# Patient Record
Sex: FEMALE | Race: WHITE | NOT HISPANIC OR LATINO | Employment: FULL TIME | URBAN - METROPOLITAN AREA
[De-identification: names, ages, dates, MRNs, and addresses within clinical notes are randomized per-mention and may not be internally consistent; named-entity substitution may affect disease eponyms.]

---

## 2017-05-04 ENCOUNTER — TRANSCRIBE ORDERS (OUTPATIENT)
Dept: ADMINISTRATIVE | Facility: HOSPITAL | Age: 57
End: 2017-05-04

## 2017-05-04 DIAGNOSIS — R42 VERTIGO: Primary | ICD-10-CM

## 2017-05-11 ENCOUNTER — HOSPITAL ENCOUNTER (OUTPATIENT)
Dept: PULMONOLOGY | Facility: HOSPITAL | Age: 57
Discharge: HOME | End: 2017-05-11
Admitting: FAMILY MEDICINE

## 2017-05-11 DIAGNOSIS — R42 VERTIGO: ICD-10-CM

## 2017-05-11 PROCEDURE — 95819 EEG AWAKE AND ASLEEP: CPT

## 2017-12-06 ENCOUNTER — HOSPITAL ENCOUNTER (EMERGENCY)
Facility: HOSPITAL | Age: 57
Discharge: HOME | End: 2017-12-07
Attending: EMERGENCY MEDICINE | Admitting: EMERGENCY MEDICINE

## 2017-12-06 ENCOUNTER — APPOINTMENT (OUTPATIENT)
Dept: CT IMAGING | Facility: HOSPITAL | Age: 57
End: 2017-12-06

## 2017-12-06 DIAGNOSIS — R03.0 ELEVATED BLOOD-PRESSURE READING WITHOUT DIAGNOSIS OF HYPERTENSION: ICD-10-CM

## 2017-12-06 DIAGNOSIS — M54.50 ACUTE LEFT-SIDED LOW BACK PAIN WITHOUT SCIATICA: Primary | ICD-10-CM

## 2017-12-06 LAB
ALBUMIN SERPL-MCNC: 4.2 G/DL (ref 3.5–5.2)
ALBUMIN/GLOB SERPL: 1.3 G/DL
ALP SERPL-CCNC: 80 U/L (ref 40–129)
ALT SERPL W P-5'-P-CCNC: 18 U/L (ref 5–33)
ANION GAP SERPL CALCULATED.3IONS-SCNC: 16.8 MMOL/L
AST SERPL-CCNC: 11 U/L (ref 5–32)
BACTERIA UR QL AUTO: ABNORMAL /HPF
BASOPHILS # BLD AUTO: 0.08 10*3/MM3 (ref 0–0.2)
BASOPHILS NFR BLD AUTO: 1.1 % (ref 0–2)
BILIRUB SERPL-MCNC: 0.5 MG/DL (ref 0.2–1.2)
BILIRUB UR QL STRIP: NEGATIVE
BUN BLD-MCNC: 14 MG/DL (ref 6–20)
BUN/CREAT SERPL: 17.1 (ref 7–25)
CALCIUM SPEC-SCNC: 9.2 MG/DL (ref 8.6–10.5)
CHLORIDE SERPL-SCNC: 102 MMOL/L (ref 98–107)
CLARITY UR: CLEAR
CO2 SERPL-SCNC: 23.2 MMOL/L (ref 22–29)
COLOR UR: YELLOW
CREAT BLD-MCNC: 0.82 MG/DL (ref 0.57–1)
DEPRECATED RDW RBC AUTO: 42.9 FL (ref 37–54)
EOSINOPHIL # BLD AUTO: 0.27 10*3/MM3 (ref 0.1–0.3)
EOSINOPHIL NFR BLD AUTO: 3.6 % (ref 0–4)
ERYTHROCYTE [DISTWIDTH] IN BLOOD BY AUTOMATED COUNT: 13.2 % (ref 11.5–14.5)
GFR SERPL CREATININE-BSD FRML MDRD: 72 ML/MIN/1.73
GLOBULIN UR ELPH-MCNC: 3.2 GM/DL
GLUCOSE BLD-MCNC: 106 MG/DL (ref 65–99)
GLUCOSE UR STRIP-MCNC: NEGATIVE MG/DL
HCT VFR BLD AUTO: 40.1 % (ref 37–47)
HGB BLD-MCNC: 13.3 G/DL (ref 12–16)
HGB UR QL STRIP.AUTO: NEGATIVE
HYALINE CASTS UR QL AUTO: ABNORMAL /LPF
IMM GRANULOCYTES # BLD: 0.01 10*3/MM3 (ref 0–0.03)
IMM GRANULOCYTES NFR BLD: 0.1 % (ref 0–0.5)
KETONES UR QL STRIP: ABNORMAL
LEUKOCYTE ESTERASE UR QL STRIP.AUTO: ABNORMAL
LYMPHOCYTES # BLD AUTO: 2.28 10*3/MM3 (ref 0.6–4.8)
LYMPHOCYTES NFR BLD AUTO: 30.8 % (ref 20–45)
MCH RBC QN AUTO: 29.4 PG (ref 27–31)
MCHC RBC AUTO-ENTMCNC: 33.2 G/DL (ref 31–37)
MCV RBC AUTO: 88.5 FL (ref 81–99)
MONOCYTES # BLD AUTO: 0.69 10*3/MM3 (ref 0–1)
MONOCYTES NFR BLD AUTO: 9.3 % (ref 3–8)
NEUTROPHILS # BLD AUTO: 4.08 10*3/MM3 (ref 1.5–8.3)
NEUTROPHILS NFR BLD AUTO: 55.1 % (ref 45–70)
NITRITE UR QL STRIP: NEGATIVE
NRBC BLD MANUAL-RTO: 0 /100 WBC (ref 0–0)
PH UR STRIP.AUTO: 5.5 [PH] (ref 4.5–8)
PLATELET # BLD AUTO: 231 10*3/MM3 (ref 140–500)
PMV BLD AUTO: 10.6 FL (ref 7.4–10.4)
POTASSIUM BLD-SCNC: 3.7 MMOL/L (ref 3.5–5.2)
PROT SERPL-MCNC: 7.4 G/DL (ref 6–8.5)
PROT UR QL STRIP: ABNORMAL
RBC # BLD AUTO: 4.53 10*6/MM3 (ref 4.2–5.4)
RBC # UR: ABNORMAL /HPF
REF LAB TEST METHOD: ABNORMAL
SODIUM BLD-SCNC: 142 MMOL/L (ref 136–145)
SP GR UR STRIP: 1.03 (ref 1–1.03)
SQUAMOUS #/AREA URNS HPF: ABNORMAL /HPF
UROBILINOGEN UR QL STRIP: ABNORMAL
WBC NRBC COR # BLD: 7.41 10*3/MM3 (ref 4.8–10.8)
WBC UR QL AUTO: ABNORMAL /HPF

## 2017-12-06 PROCEDURE — 81001 URINALYSIS AUTO W/SCOPE: CPT | Performed by: EMERGENCY MEDICINE

## 2017-12-06 PROCEDURE — 25010000002 ONDANSETRON PER 1 MG

## 2017-12-06 PROCEDURE — 96375 TX/PRO/DX INJ NEW DRUG ADDON: CPT

## 2017-12-06 PROCEDURE — 74176 CT ABD & PELVIS W/O CONTRAST: CPT

## 2017-12-06 PROCEDURE — 25010000002 KETOROLAC TROMETHAMINE PER 15 MG

## 2017-12-06 PROCEDURE — 96374 THER/PROPH/DIAG INJ IV PUSH: CPT

## 2017-12-06 PROCEDURE — 85025 COMPLETE CBC W/AUTO DIFF WBC: CPT | Performed by: EMERGENCY MEDICINE

## 2017-12-06 PROCEDURE — 99284 EMERGENCY DEPT VISIT MOD MDM: CPT | Performed by: EMERGENCY MEDICINE

## 2017-12-06 PROCEDURE — 25010000002 HYDROMORPHONE PER 4 MG

## 2017-12-06 PROCEDURE — 99284 EMERGENCY DEPT VISIT MOD MDM: CPT

## 2017-12-06 PROCEDURE — 80053 COMPREHEN METABOLIC PANEL: CPT | Performed by: EMERGENCY MEDICINE

## 2017-12-06 RX ORDER — ONDANSETRON 2 MG/ML
4 INJECTION INTRAMUSCULAR; INTRAVENOUS ONCE
Status: COMPLETED | OUTPATIENT
Start: 2017-12-06 | End: 2017-12-06

## 2017-12-06 RX ORDER — KETOROLAC TROMETHAMINE 30 MG/ML
30 INJECTION, SOLUTION INTRAMUSCULAR; INTRAVENOUS ONCE
Status: COMPLETED | OUTPATIENT
Start: 2017-12-06 | End: 2017-12-06

## 2017-12-06 RX ORDER — ONDANSETRON 2 MG/ML
INJECTION INTRAMUSCULAR; INTRAVENOUS
Status: COMPLETED
Start: 2017-12-06 | End: 2017-12-06

## 2017-12-06 RX ORDER — KETOROLAC TROMETHAMINE 30 MG/ML
INJECTION, SOLUTION INTRAMUSCULAR; INTRAVENOUS
Status: COMPLETED
Start: 2017-12-06 | End: 2017-12-06

## 2017-12-06 RX ORDER — SODIUM CHLORIDE 0.9 % (FLUSH) 0.9 %
10 SYRINGE (ML) INJECTION AS NEEDED
Status: DISCONTINUED | OUTPATIENT
Start: 2017-12-06 | End: 2017-12-07 | Stop reason: HOSPADM

## 2017-12-06 RX ORDER — PHENTERMINE HYDROCHLORIDE 37.5 MG/1
37.5 CAPSULE ORAL EVERY MORNING
COMMUNITY
End: 2018-03-07 | Stop reason: ALTCHOICE

## 2017-12-06 RX ADMIN — KETOROLAC TROMETHAMINE 30 MG: 30 INJECTION INTRAMUSCULAR; INTRAVENOUS at 22:46

## 2017-12-06 RX ADMIN — ONDANSETRON 4 MG: 2 INJECTION INTRAMUSCULAR; INTRAVENOUS at 22:46

## 2017-12-06 RX ADMIN — HYDROMORPHONE HYDROCHLORIDE 1 MG: 1 INJECTION, SOLUTION INTRAMUSCULAR; INTRAVENOUS; SUBCUTANEOUS at 22:47

## 2017-12-06 RX ADMIN — KETOROLAC TROMETHAMINE 30 MG: 30 INJECTION, SOLUTION INTRAMUSCULAR; INTRAVENOUS at 22:46

## 2017-12-06 RX ADMIN — ONDANSETRON 4 MG: 2 INJECTION, SOLUTION INTRAMUSCULAR; INTRAVENOUS at 22:46

## 2017-12-06 RX ADMIN — Medication 1 MG: at 22:47

## 2017-12-07 VITALS
HEART RATE: 87 BPM | OXYGEN SATURATION: 96 % | RESPIRATION RATE: 20 BRPM | SYSTOLIC BLOOD PRESSURE: 159 MMHG | WEIGHT: 245 LBS | HEIGHT: 64 IN | TEMPERATURE: 97.2 F | BODY MASS INDEX: 41.83 KG/M2 | DIASTOLIC BLOOD PRESSURE: 106 MMHG

## 2017-12-07 PROCEDURE — 63710000001 PREDNISONE PER 1 MG: Performed by: EMERGENCY MEDICINE

## 2017-12-07 PROCEDURE — 99284 EMERGENCY DEPT VISIT MOD MDM: CPT | Performed by: EMERGENCY MEDICINE

## 2017-12-07 RX ORDER — ONDANSETRON 4 MG/1
TABLET, ORALLY DISINTEGRATING ORAL
Status: COMPLETED
Start: 2017-12-07 | End: 2017-12-07

## 2017-12-07 RX ORDER — PREDNISONE 10 MG/1
TABLET ORAL
Qty: 21 TABLET | Refills: 0 | Status: SHIPPED | OUTPATIENT
Start: 2017-12-07 | End: 2018-03-09 | Stop reason: HOSPADM

## 2017-12-07 RX ORDER — NABUMETONE 750 MG/1
750 TABLET, FILM COATED ORAL 2 TIMES DAILY PRN
Qty: 14 TABLET | Refills: 0 | Status: SHIPPED | OUTPATIENT
Start: 2017-12-07 | End: 2017-12-14

## 2017-12-07 RX ORDER — ONDANSETRON 4 MG/1
4 TABLET, ORALLY DISINTEGRATING ORAL ONCE
Status: COMPLETED | OUTPATIENT
Start: 2017-12-07 | End: 2017-12-07

## 2017-12-07 RX ORDER — OXYCODONE HYDROCHLORIDE AND ACETAMINOPHEN 5; 325 MG/1; MG/1
1 TABLET ORAL EVERY 6 HOURS PRN
Qty: 25 TABLET | Refills: 0 | Status: SHIPPED | OUTPATIENT
Start: 2017-12-07 | End: 2018-03-09 | Stop reason: HOSPADM

## 2017-12-07 RX ORDER — METHOCARBAMOL 750 MG/1
1500 TABLET, FILM COATED ORAL 3 TIMES DAILY PRN
Qty: 42 TABLET | Refills: 0 | Status: SHIPPED | OUTPATIENT
Start: 2017-12-07 | End: 2017-12-14

## 2017-12-07 RX ORDER — PREDNISONE 20 MG/1
60 TABLET ORAL ONCE
Status: COMPLETED | OUTPATIENT
Start: 2017-12-07 | End: 2017-12-07

## 2017-12-07 RX ADMIN — ONDANSETRON 4 MG: 4 TABLET, ORALLY DISINTEGRATING ORAL at 00:56

## 2017-12-07 RX ADMIN — PREDNISONE 60 MG: 20 TABLET ORAL at 00:51

## 2017-12-07 NOTE — ED PROVIDER NOTES
Subjective   Patient is a 57 y.o. female presenting with back pain.   History provided by:  Patient  Back Pain   Location:  Sacro-iliac joint (left)  Quality:  Stabbing and shooting  Radiates to: to left flank.  Pain severity:  Severe  Onset quality: gradual onset of pain this past weekend.  The pain worsened acutely 9 PM tonight.  Progression:  Waxing and waning  Chronicity:  New  Context comment:  As described below.  Patient initially saw the pain may have been secondary to sitting on bleachers this weekend.  Relieved by:  Nothing  Worsened by:  Nothing  Ineffective treatments:  NSAIDs  Associated symptoms: no abdominal swelling, no bladder incontinence, no bowel incontinence, no chest pain, no dysuria, no fever, no headaches, no leg pain, no numbness, no paresthesias, no pelvic pain, no perianal numbness, no tingling, no weakness and no weight loss    Associated symptoms comment:  Nausea but no vomiting.  Risk factors: lack of exercise and obesity      HPI Narrative:Ms. Pennington is a 58 yo WF Coumadin secondary to left low back pain.  Onset of symptoms 5 days ago.  No initiating illness or injury.  The back pain was mild at that time.  It is waxed and waned since then.  Approximately 9 PM this evening the pain worsened acutely.  She is unable find a comfortable position.  She has had nausea but no vomiting.  She denies hematuria, dysuria or urinary frequency.  No other associated symptoms.  No fever or chills.  No ill contacts.  Patient presents for evaluation.        Review of Systems   Constitutional: Negative.  Negative for appetite change, diaphoresis, fever and weight loss.   HENT: Negative.    Eyes: Negative.    Respiratory: Negative for cough, chest tightness, shortness of breath and wheezing.    Cardiovascular: Negative for chest pain, palpitations and leg swelling.   Gastrointestinal: Negative for bowel incontinence.   Genitourinary: Negative.  Negative for bladder incontinence, difficulty urinating,  dysuria, flank pain, frequency, hematuria and pelvic pain.   Musculoskeletal: Positive for back pain.   Skin: Negative.  Negative for color change, pallor and rash.   Neurological: Negative.  Negative for dizziness, tingling, seizures, syncope, weakness, numbness, headaches and paresthesias.   Psychiatric/Behavioral: Negative.  Negative for agitation, behavioral problems and hallucinations.       Past Medical History:   Diagnosis Date   • Kidney stone        No Known Allergies    Past Surgical History:   Procedure Laterality Date   • APPENDECTOMY     • HYSTERECTOMY     • LAPAROSCOPIC GASTRIC BANDING         History reviewed. No pertinent family history.    Social History     Social History   • Marital status:      Spouse name: N/A   • Number of children: N/A   • Years of education: N/A     Social History Main Topics   • Smoking status: Never Smoker   • Smokeless tobacco: None   • Alcohol use No   • Drug use: None   • Sexual activity: Not Asked     Other Topics Concern   • None     Social History Narrative   • None           Objective   Physical Exam   Constitutional: She is oriented to person, place, and time. She appears well-developed and well-nourished. No distress.   57-year-old white female sitting on the side of bed.  She is leaning forward with her hands on her knees.  garbage can is beside patient's is beside her left foot.  She is rocking to and fro and is in obvious pain.     HENT:   Head: Normocephalic and atraumatic.   Right Ear: External ear normal.   Left Ear: External ear normal.   Nose: Nose normal.   Mouth/Throat: Oropharynx is clear and moist.   Eyes: Conjunctivae and EOM are normal. Pupils are equal, round, and reactive to light.   Neck: Normal range of motion. Neck supple.   Cardiovascular: Normal rate, regular rhythm, normal heart sounds and intact distal pulses.  Exam reveals no gallop and no friction rub.    No murmur heard.  Pulmonary/Chest: Effort normal and breath sounds normal.    Abdominal: Soft. Bowel sounds are normal. She exhibits no distension. There is no tenderness.   Musculoskeletal: Normal range of motion. She exhibits no tenderness.   Neurological: She is alert and oriented to person, place, and time.   Skin: Skin is warm and dry. She is not diaphoretic.   Psychiatric: She has a normal mood and affect. Her behavior is normal.   Nursing note and vitals reviewed.      Procedures         ED Course  ED Course   Comment By Time   12/06/17  10:51 PM  Patient is in obvious discomfort.  I suspect patient has a kidney stone.  Given Dilaudid, Toradol and Zofran. Miguel Thompson MD 12/07 0022 12/06/17  11:33 PM  Patient reports pain is much improved.  Nausea has resolved. Miguel Thompson MD 12/07 0023 12/07/17  12:23 AM  CBC and CMP are unremarkable.  UA notable for 15 ketones, trace bacteria withno WBCs and 0-2 RBCs.  Trace leukocyte Estrace.  Negative nitrate.  Awaiting CT results. Miguel Thompson MD 12/07 0023 12/07/17  12:42 AM  CT doesn't show any cause for patient's pain. Patient now reports she was at a ball game and was seated on bleachers for a few hours prior to onset of symptoms.  I feel patient's symptoms are due to low back pain.  Specifically the left SI joint.  Patient's blood pressures improved but still elevated.  Patient does not have a history of hypertension.  She reports however that her cough has been monitoring her blood pressure the past few months.  Will prescribe steroids, muscle relaxants, NSAIDs and pain medication for home.  Discussed at length with patient and family all results, diagnoses, treatment and follow-up. Miguel Thompson MD 12/07 0043      Labs Reviewed   URINALYSIS W/ CULTURE IF INDICATED - Abnormal; Notable for the following:        Result Value    Ketones, UA 15 mg/dL (1+) (*)     Protein, UA 30 mg/dL (1+) (*)     Leuk Esterase, UA Trace (*)     All other components within normal limits   COMPREHENSIVE METABOLIC PANEL - Abnormal;  Notable for the following:     Glucose 106 (*)     All other components within normal limits   CBC WITH AUTO DIFFERENTIAL - Abnormal; Notable for the following:     MPV 10.6 (*)     Monocyte % 9.3 (*)     All other components within normal limits   URINALYSIS, MICROSCOPIC ONLY - Abnormal; Notable for the following:     RBC, UA 0-2 (*)     Bacteria, UA Trace (*)     All other components within normal limits   CBC AND DIFFERENTIAL    Narrative:     The following orders were created for panel order CBC & Differential.  Procedure                               Abnormality         Status                     ---------                               -----------         ------                     CBC Auto Differential[[id number]]        Abnormal            Final result                 Please view results for these tests on the individual orders.     My differential diagnosis for dyspnea includes but is not limited to:  Asthma, COPD, pneumonia, pulmonary embolus, acute respiratory distress syndrome, pneumothorax, pleural effusion, pulmonary fibrosis, congestive heart failure, myocardial infarction, DKA, uremia, acidosis, sepsis, anemia, drug related, hyperventilation, CNS disease    My differential diagnosis for chest pain includes but is not limited to:  Muscle strain, costochondritis, myositis, pleurisy, rib fracture, intercostal neuritis, herpes zoster, tumor, myocardial infarction, coronary syndrome, unstable angina, angina, aortic dissection, mitral valve prolapse, pericarditis, palpitations, pulmonary embolus, pneumonia, pneumothorax, lung cancer, GERD, esophagitis, esophageal spasm              MDM  Number of Diagnoses or Management Options  Acute left-sided low back pain without sciatica: new and requires workup  Elevated blood-pressure reading without diagnosis of hypertension: new and requires workup     Amount and/or Complexity of Data Reviewed  Clinical lab tests: reviewed and ordered  Tests in the radiology section  of CPT®: reviewed and ordered  Decide to obtain previous medical records or to obtain history from someone other than the patient: yes (Indu Solares RN here at [facility])  Independent visualization of images, tracings, or specimens: yes    Risk of Complications, Morbidity, and/or Mortality  Presenting problems: low  Diagnostic procedures: moderate  Management options: low    Patient Progress  Patient progress: stable      Final diagnoses:   Acute left-sided low back pain without sciatica   Elevated blood-pressure reading without diagnosis of hypertension              Miguel Thompson MD  12/07/17 0306

## 2017-12-07 NOTE — ED NOTES
Pt's pain worsened with sitting up and dressing.  Pt became nauseated.  Dr Thompson aware.  Salo ordered.     Kim Presley RN  12/07/17 0059

## 2017-12-07 NOTE — DISCHARGE INSTRUCTIONS
Medication as directed.  Apply heat to painful area.  Gentle stretching.  Recommend take blood pressure at home once to twice daily and record.  Take his record to follow-up with Dr. Downing.  Call Dr. Downing's office in the morning to arrange follow-up within the next week for continued or worsening symptoms.  Sooner if needed.  Return to ED for medical emergencies.    Hypertension  Hypertension, commonly called high blood pressure, is when the force of blood pumping through your arteries is too strong. Your arteries are the blood vessels that carry blood from your heart throughout your body. A blood pressure reading consists of a higher number over a lower number, such as 110/72. The higher number (systolic) is the pressure inside your arteries when your heart pumps. The lower number (diastolic) is the pressure inside your arteries when your heart relaxes. Ideally you want your blood pressure below 120/80.  Hypertension forces your heart to work harder to pump blood. Your arteries may become narrow or stiff. Having untreated or uncontrolled hypertension can cause heart attack, stroke, kidney disease, and other problems.  RISK FACTORS  Some risk factors for high blood pressure are controllable. Others are not.   Risk factors you cannot control include:   · Race. You may be at higher risk if you are .  · Age. Risk increases with age.  · Gender. Men are at higher risk than women before age 45 years. After age 65, women are at higher risk than men.  Risk factors you can control include:  · Not getting enough exercise or physical activity.  · Being overweight.  · Getting too much fat, sugar, calories, or salt in your diet.  · Drinking too much alcohol.  SIGNS AND SYMPTOMS  Hypertension does not usually cause signs or symptoms. Extremely high blood pressure (hypertensive crisis) may cause headache, anxiety, shortness of breath, and nosebleed.  DIAGNOSIS  To check if you have hypertension, your health care  provider will measure your blood pressure while you are seated, with your arm held at the level of your heart. It should be measured at least twice using the same arm. Certain conditions can cause a difference in blood pressure between your right and left arms. A blood pressure reading that is higher than normal on one occasion does not mean that you need treatment. If it is not clear whether you have high blood pressure, you may be asked to return on a different day to have your blood pressure checked again. Or, you may be asked to monitor your blood pressure at home for 1 or more weeks.  TREATMENT  Treating high blood pressure includes making lifestyle changes and possibly taking medicine. Living a healthy lifestyle can help lower high blood pressure. You may need to change some of your habits.  Lifestyle changes may include:  · Following the DASH diet. This diet is high in fruits, vegetables, and whole grains. It is low in salt, red meat, and added sugars.  · Keep your sodium intake below 2,300 mg per day.  · Getting at least 30-45 minutes of aerobic exercise at least 4 times per week.  · Losing weight if necessary.  · Not smoking.  · Limiting alcoholic beverages.  · Learning ways to reduce stress.  Your health care provider may prescribe medicine if lifestyle changes are not enough to get your blood pressure under control, and if one of the following is true:  · You are 18-59 years of age and your systolic blood pressure is above 140.  · You are 60 years of age or older, and your systolic blood pressure is above 150.  · Your diastolic blood pressure is above 90.  · You have diabetes, and your systolic blood pressure is over 140 or your diastolic blood pressure is over 90.  · You have kidney disease and your blood pressure is above 140/90.  · You have heart disease and your blood pressure is above 140/90.  Your personal target blood pressure may vary depending on your medical conditions, your age, and other  factors.  HOME CARE INSTRUCTIONS  · Have your blood pressure rechecked as directed by your health care provider.    · Take medicines only as directed by your health care provider. Follow the directions carefully. Blood pressure medicines must be taken as prescribed. The medicine does not work as well when you skip doses. Skipping doses also puts you at risk for problems.  · Do not smoke.    · Monitor your blood pressure at home as directed by your health care provider.   SEEK MEDICAL CARE IF:   · You think you are having a reaction to medicines taken.  · You have recurrent headaches or feel dizzy.  · You have swelling in your ankles.  · You have trouble with your vision.  SEEK IMMEDIATE MEDICAL CARE IF:  · You develop a severe headache or confusion.  · You have unusual weakness, numbness, or feel faint.  · You have severe chest or abdominal pain.  · You vomit repeatedly.  · You have trouble breathing.  MAKE SURE YOU:   · Understand these instructions.  · Will watch your condition.  · Will get help right away if you are not doing well or get worse.     This information is not intended to replace advice given to you by your health care provider. Make sure you discuss any questions you have with your health care provider.     Document Released: 12/18/2006 Document Revised: 05/03/2016 Document Reviewed: 10/10/2014  [employer] Interactive Patient Education ©2017 [employer]

## 2017-12-07 NOTE — ED PROVIDER NOTES
Subjective   History of Present Illness    Review of Systems    Past Medical History:   Diagnosis Date   • Kidney stone        No Known Allergies    Past Surgical History:   Procedure Laterality Date   • APPENDECTOMY     • HYSTERECTOMY     • LAPAROSCOPIC GASTRIC BANDING         History reviewed. No pertinent family history.    Social History     Social History   • Marital status:      Spouse name: N/A   • Number of children: N/A   • Years of education: N/A     Social History Main Topics   • Smoking status: Never Smoker   • Smokeless tobacco: None   • Alcohol use No   • Drug use: None   • Sexual activity: Not Asked     Other Topics Concern   • None     Social History Narrative   • None           Objective   Physical Exam    Procedures         ED Course  ED Course   Comment By Time   12/06/17  10:51 PM  Patient is in obvious discomfort.  I suspect patient has a kidney stone.  Given Dilaudid, Toradol and Zofran. Miguel Thompson MD 12/07 0022 12/06/17  11:33 PM  Patient reports pain is much improved.  Nausea has resolved. Miguel Thompson MD 12/07 0023 12/07/17  12:23 AM  CBC and CMP are unremarkable.  UA notable for 15 ketones, trace bacteria withno WBCs and 0-2 RBCs.  Trace leukocyte Estrace.  Negative nitrate.  Awaiting CT results. Miguel Thompson MD 12/07 0023 12/07/17  12:42 AM  CT doesn't show any cause for patient's pain. Patient now reports she was at a ball game and was seated on bleachers for a few hours prior to onset of symptoms.  I feel patient's symptoms are due to low back pain.  Specifically the left SI joint.  Patient's blood pressures improved but still elevated.  Patient does not have a history of hypertension.  She reports however that her cough has been monitoring her blood pressure the past few months.  Will prescribe steroids, muscle relaxants, NSAIDs and pain medication for home.  Discussed at length with patient and family all results, diagnoses, treatment and follow-up.  Miguel Thompson MD 12/07 0043        10:10 AM, 12/07/17:  Dr. 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  The radiologist says that it appeared slightly larger than on the 2009 exam.    10:11 AM, 12/07/17:  I spoke with the patient by telephone.  She was unaware that she had this finding in her lungs.  Advised to follow-up with Dr. Downing, her primary care provider and should return to emergency department if she gets worse.          MDM    Final diagnoses:   Acute left-sided low back pain without sciatica   Elevated blood-pressure reading without diagnosis of hypertension            Ant Montiel MD  12/07/17 1011

## 2017-12-11 ENCOUNTER — TRANSCRIBE ORDERS (OUTPATIENT)
Dept: ADMINISTRATIVE | Facility: HOSPITAL | Age: 57
End: 2017-12-11

## 2017-12-11 DIAGNOSIS — I77.89 AV MALFORMATION, ACQUIRED (HCC): Primary | ICD-10-CM

## 2017-12-15 ENCOUNTER — HOSPITAL ENCOUNTER (OUTPATIENT)
Dept: CT IMAGING | Facility: HOSPITAL | Age: 57
Discharge: HOME | End: 2017-12-15
Attending: FAMILY MEDICINE | Admitting: FAMILY MEDICINE

## 2017-12-15 ENCOUNTER — HOSPITAL ENCOUNTER (OUTPATIENT)
Dept: GENERAL RADIOLOGY | Facility: HOSPITAL | Age: 57
Discharge: HOME | End: 2017-12-15

## 2017-12-15 DIAGNOSIS — I77.89 AV MALFORMATION, ACQUIRED (HCC): ICD-10-CM

## 2017-12-15 PROCEDURE — 71275 CT ANGIOGRAPHY CHEST: CPT

## 2017-12-15 PROCEDURE — 0 IOPAMIDOL PER 1 ML: Performed by: FAMILY MEDICINE

## 2017-12-15 PROCEDURE — 72100 X-RAY EXAM L-S SPINE 2/3 VWS: CPT

## 2017-12-15 RX ADMIN — IOPAMIDOL 100 ML: 755 INJECTION, SOLUTION INTRAVENOUS at 09:11

## 2017-12-15 RX ADMIN — IOPAMIDOL 50 ML: 755 INJECTION, SOLUTION INTRAVENOUS at 09:11

## 2018-01-30 ENCOUNTER — OFFICE VISIT (OUTPATIENT)
Dept: SURGERY | Facility: CLINIC | Age: 58
End: 2018-01-30

## 2018-01-30 VITALS
SYSTOLIC BLOOD PRESSURE: 144 MMHG | HEART RATE: 105 BPM | DIASTOLIC BLOOD PRESSURE: 85 MMHG | WEIGHT: 224.6 LBS | BODY MASS INDEX: 38.35 KG/M2 | HEIGHT: 64 IN | OXYGEN SATURATION: 94 %

## 2018-01-30 DIAGNOSIS — Q27.30 ARTERIOVENOUS MALFORMATION (AVM): Primary | ICD-10-CM

## 2018-01-30 PROCEDURE — 99244 OFF/OP CNSLTJ NEW/EST MOD 40: CPT | Performed by: THORACIC SURGERY (CARDIOTHORACIC VASCULAR SURGERY)

## 2018-01-30 NOTE — PROGRESS NOTES
Subjective   Patient ID: Rylee Pennington is a 57 y.o. female is being seen for consultation today at the request of Dr. Ant Downing.    History of Present Illness  Dear Colleague,  Rylee Pennington was seen in our office today for evaluation and treatment of a pulmonary arteriovenous malformation.  Reviewed her history, her x-rays, and have examined her.  Ms. Pennington presented to the emergency room with low back pain.  During her evaluation CT scan of the chest was performed.  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  This has been asymptomatic.  She has no cough or hemoptysis.  She has no pleuritic pain.  She has no hoarseness or change in her voice.  She is active without significant shortness of breath or dyspnea on exertion.  She has had no unexplained weight loss.    The following portions of the patient's history were reviewed and updated as appropriate: allergies, current medications, past family history, past medical history, past social history, past surgical history and problem list.  Review of Systems   Constitution: Negative.   HENT: Negative.    Eyes: Negative.    Cardiovascular: Negative.    Respiratory: Negative.    Endocrine: Negative.    Hematologic/Lymphatic: Negative.    Skin: Negative.    Musculoskeletal: Negative.    Gastrointestinal: Negative.    Genitourinary: Negative.    Neurological: Negative.    Psychiatric/Behavioral: Negative.      Patient Active Problem List   Diagnosis   • Arteriovenous malformation (AVM)     Past Medical History:   Diagnosis Date   • Kidney stone      Past Surgical History:   Procedure Laterality Date   • APPENDECTOMY     • HYSTERECTOMY     • LAPAROSCOPIC GASTRIC BANDING       History reviewed. No pertinent family history.  Social History     Social History   • Marital status:      Spouse name: N/A   • Number of children: N/A   • Years of education: N/A     Occupational History   • Not  on file.     Social History Main Topics   • Smoking status: Never Smoker   • Smokeless tobacco: Never Used   • Alcohol use No   • Drug use: Defer   • Sexual activity: Defer     Other Topics Concern   • Not on file     Social History Narrative       Current Outpatient Prescriptions:   •  phentermine 37.5 MG capsule, Take 37.5 mg by mouth Every Morning., Disp: , Rfl:   •  oxyCODONE-acetaminophen (PERCOCET) 5-325 MG per tablet, Take 1 tablet by mouth Every 6 (Six) Hours As Needed for Moderate Pain  or Severe Pain  for up to 25 doses. 2 tabs if needed, Disp: 25 tablet, Rfl: 0  •  predniSONE (DELTASONE) 10 MG tablet, 6 tabs by mouth day 1, 5 tabs by mouth day 2, continue tapering one tablet daily: Coarse 6 days., Disp: 21 tablet, Rfl: 0  No Known Allergies     Objective   Vitals:    01/30/18 1336   BP: 144/85   Pulse: 105   SpO2: 94%     Physical Exam   Constitutional: She is oriented to person, place, and time. She appears well-developed and well-nourished.   HENT:   Head: Normocephalic.   Eyes: Conjunctivae, EOM and lids are normal. Pupils are equal, round, and reactive to light.   Neck: Trachea normal and normal range of motion. Neck supple. No hepatojugular reflux and no JVD present. Carotid bruit is not present. No thyroid mass and no thyromegaly present.   Cardiovascular: Normal rate, regular rhythm, S1 normal, S2 normal and normal pulses.   No extrasystoles are present. PMI is not displaced.    Murmur heard.   Systolic murmur is present with a grade of 3/6   Very loud pansystolic murmur heard over the entire left chest posteriorly and the apex anteriorly   Pulmonary/Chest: Effort normal and breath sounds normal.   Abdominal: Soft. Normal appearance and bowel sounds are normal. She exhibits no mass. There is no hepatosplenomegaly. There is no tenderness. No hernia.   Musculoskeletal: Normal range of motion.   Neurological: She is alert and oriented to person, place, and time. She has normal strength and normal  reflexes. No cranial nerve deficit or sensory deficit. She displays a negative Romberg sign.   Skin: Skin is warm, dry and intact.   Psychiatric: She has a normal mood and affect. Her speech is normal and behavior is normal. Judgment and thought content normal. Cognition and memory are normal.     Independent Review of Radiographic Studies:    CT scan of the chest performed December 15 was independently reviewed.  There is a large vascular malformation in the left lower lobe.  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  The afferent vessel is from the aorta and measures 1.0 cm in diameter.  There are large varices in the left lower lobe with the largest vessel measuring up to 2.5 cm in diameter. The vascular malformation communicates with the left upper lobe pulmonary artery and the left lower lobe pulmonary artery.  There is poor opacification of the left pulmonary artery presumably due to reflux into the pulmonary artery.      Assessment/Plan       Will ask cardiology to evaluate the patient.  In particular I believe that she needs an echocardiogram and may need a right heart catheterization.  Will discuss with them possible embolization of the afferent vessel from the aorta. If this cannot be done would recommend surgery to ligate that vessel.  Patient will return to the office after cardiac evaluation.  I will keep you informed of her progress.    Diagnoses and all orders for this visit:    Arteriovenous malformation (AVM)  -     Ambulatory Referral to Cardiology

## 2018-03-07 ENCOUNTER — LAB (OUTPATIENT)
Dept: LAB | Facility: HOSPITAL | Age: 58
End: 2018-03-07
Attending: INTERNAL MEDICINE

## 2018-03-07 ENCOUNTER — TRANSCRIBE ORDERS (OUTPATIENT)
Dept: ADMINISTRATIVE | Facility: HOSPITAL | Age: 58
End: 2018-03-07

## 2018-03-07 ENCOUNTER — OFFICE VISIT (OUTPATIENT)
Dept: CARDIOLOGY | Facility: CLINIC | Age: 58
End: 2018-03-07

## 2018-03-07 VITALS
HEART RATE: 98 BPM | BODY MASS INDEX: 37.74 KG/M2 | WEIGHT: 213 LBS | HEIGHT: 63 IN | DIASTOLIC BLOOD PRESSURE: 76 MMHG | SYSTOLIC BLOOD PRESSURE: 150 MMHG

## 2018-03-07 DIAGNOSIS — Z13.6 SCREENING FOR ISCHEMIC HEART DISEASE: ICD-10-CM

## 2018-03-07 DIAGNOSIS — Z01.810 PRE-OPERATIVE CARDIOVASCULAR EXAMINATION: ICD-10-CM

## 2018-03-07 DIAGNOSIS — Q27.30 ARTERIOVENOUS MALFORMATION (AVM): Primary | ICD-10-CM

## 2018-03-07 DIAGNOSIS — R06.09 DOE (DYSPNEA ON EXERTION): ICD-10-CM

## 2018-03-07 DIAGNOSIS — Z01.810 PRE-OPERATIVE CARDIOVASCULAR EXAMINATION: Primary | ICD-10-CM

## 2018-03-07 LAB
ANION GAP SERPL CALCULATED.3IONS-SCNC: 14.3 MMOL/L
BASOPHILS # BLD AUTO: 0.04 10*3/MM3 (ref 0–0.2)
BASOPHILS NFR BLD AUTO: 0.6 % (ref 0–1.5)
BUN BLD-MCNC: 14 MG/DL (ref 6–20)
BUN/CREAT SERPL: 16.1 (ref 7–25)
CALCIUM SPEC-SCNC: 10.2 MG/DL (ref 8.6–10.5)
CHLORIDE SERPL-SCNC: 101 MMOL/L (ref 98–107)
CO2 SERPL-SCNC: 27.7 MMOL/L (ref 22–29)
CREAT BLD-MCNC: 0.87 MG/DL (ref 0.57–1)
DEPRECATED RDW RBC AUTO: 46.7 FL (ref 37–54)
EOSINOPHIL # BLD AUTO: 0.2 10*3/MM3 (ref 0–0.7)
EOSINOPHIL NFR BLD AUTO: 3 % (ref 0.3–6.2)
ERYTHROCYTE [DISTWIDTH] IN BLOOD BY AUTOMATED COUNT: 14.2 % (ref 11.7–13)
GFR SERPL CREATININE-BSD FRML MDRD: 67 ML/MIN/1.73
GLUCOSE BLD-MCNC: 101 MG/DL (ref 65–99)
HCT VFR BLD AUTO: 40.1 % (ref 35.6–45.5)
HGB BLD-MCNC: 12.3 G/DL (ref 11.9–15.5)
IMM GRANULOCYTES # BLD: 0 10*3/MM3 (ref 0–0.03)
IMM GRANULOCYTES NFR BLD: 0 % (ref 0–0.5)
LYMPHOCYTES # BLD AUTO: 2.51 10*3/MM3 (ref 0.9–4.8)
LYMPHOCYTES NFR BLD AUTO: 38 % (ref 19.6–45.3)
MCH RBC QN AUTO: 27.5 PG (ref 26.9–32)
MCHC RBC AUTO-ENTMCNC: 30.7 G/DL (ref 32.4–36.3)
MCV RBC AUTO: 89.7 FL (ref 80.5–98.2)
MONOCYTES # BLD AUTO: 0.73 10*3/MM3 (ref 0.2–1.2)
MONOCYTES NFR BLD AUTO: 11.1 % (ref 5–12)
NEUTROPHILS # BLD AUTO: 3.12 10*3/MM3 (ref 1.9–8.1)
NEUTROPHILS NFR BLD AUTO: 47.3 % (ref 42.7–76)
PLATELET # BLD AUTO: 236 10*3/MM3 (ref 140–500)
PMV BLD AUTO: 10.2 FL (ref 6–12)
POTASSIUM BLD-SCNC: 3.5 MMOL/L (ref 3.5–5.2)
RBC # BLD AUTO: 4.47 10*6/MM3 (ref 3.9–5.2)
SODIUM BLD-SCNC: 143 MMOL/L (ref 136–145)
WBC NRBC COR # BLD: 6.6 10*3/MM3 (ref 4.5–10.7)

## 2018-03-07 PROCEDURE — 99204 OFFICE O/P NEW MOD 45 MIN: CPT | Performed by: INTERNAL MEDICINE

## 2018-03-07 PROCEDURE — 93000 ELECTROCARDIOGRAM COMPLETE: CPT | Performed by: INTERNAL MEDICINE

## 2018-03-07 PROCEDURE — 36415 COLL VENOUS BLD VENIPUNCTURE: CPT

## 2018-03-07 PROCEDURE — 80048 BASIC METABOLIC PNL TOTAL CA: CPT

## 2018-03-07 PROCEDURE — 85025 COMPLETE CBC W/AUTO DIFF WBC: CPT

## 2018-03-07 NOTE — PROGRESS NOTES
PATIENTINFORMATION    Date of Office Visit: 2018  Encounter Provider: Philly Davalos MD  Place of Service: [facility] CARDIOLOGY  Patient Name: Rylee Pennington  : 1960    Subjective:     Encounter Date:2018      Patient ID: Rylee Pennington is a 57 y.o. female.      History of Present Illness    This is a lady who was in the emergency room for low back pain in 2017.  They did a CT scan of her abdomen and pelvis due to the back pain and this noted a large pulmonary venous malformation in the left lower lobe, which appeared to be slightly larger than one that was noted in .  Because of this, she was sent for a CT angiogram of the chest in 2017, which showed a large pulmonary vascular malformation involving the descending thoracic aorta, left upper pulmonary artery, and left lower pulmonary artery.  The varice measured up to 2.5 cm.  She was referred to see Dr. Briseno who she saw on 2018, and he felt she would benefit from an echocardiogram and right heart catheterization and possible embolization of the vessel coming off the aorta.  He felt, if this was not possible, surgery could be performed to ligate that vessel.  She has really no symptoms.  She has some shortness of breath with exertion, which she attributes to her weight, but denies chest pain, lower extremity edema, or lightheadedness.  She has no diabetes, hypertension, or hyperlipidemia.  She used to use a CPAP but lost weight after gastric band and no longer uses CPAP.  She does have a history of premature coronary disease in her father and her son.  Her son has had two heart attacks.  His first was in his thirties.       Review of Systems   Constitution: Negative for fever, malaise/fatigue, weight gain and weight loss.   HENT: Negative for ear pain, hearing loss, nosebleeds and sore throat.    Eyes: Negative for double vision, pain, vision loss in left eye and vision loss in right eye.  "  Cardiovascular:        See history of present illness.   Respiratory: Negative for cough, shortness of breath, sleep disturbances due to breathing, snoring and wheezing.    Endocrine: Negative for cold intolerance, heat intolerance and polyuria.   Skin: Negative for itching, poor wound healing and rash.   Musculoskeletal: Negative for joint pain, joint swelling and myalgias.   Gastrointestinal: Negative for abdominal pain, diarrhea, hematochezia, nausea and vomiting.   Genitourinary: Negative for hematuria and hesitancy.   Neurological: Negative for numbness, paresthesias and seizures.   Psychiatric/Behavioral: Negative for depression. The patient is not nervous/anxious.            ECG 12 Lead  Date/Time: 3/7/2018 11:00 AM  Performed by: PAUL BORJA  Authorized by: PAUL BORJA   Previous ECG: no previous ECG available  Rhythm: sinus rhythm  BPM: 98  Conduction: conduction normal  ST Segments: ST segments normal  T Waves: T waves normal  Clinical impression: normal ECG               Objective:     /76 (BP Location: Left arm, Patient Position: Sitting)  Pulse 98  Ht 160 cm (63\")  Wt 96.6 kg (213 lb)  BMI 37.73 kg/m2 Body mass index is 37.73 kg/(m^2).     Physical Exam   Constitutional: She appears well-developed.   HENT:   Head: Normocephalic and atraumatic.   Eyes: Conjunctivae and lids are normal. Pupils are equal, round, and reactive to light. Lids are everted and swept, no foreign bodies found.   Neck: Normal range of motion. No JVD present. Carotid bruit is not present. No tracheal deviation present. No thyroid mass present.   Cardiovascular: Normal rate, regular rhythm and normal heart sounds.    Pulses:       Dorsalis pedis pulses are 2+ on the right side, and 2+ on the left side.   Pulmonary/Chest: Effort normal and breath sounds normal.   She has a loud bruit noted over the left lower lobe   Abdominal: Normal appearance and bowel sounds are normal.   Musculoskeletal: Normal range of " motion.   Neurological: She is alert. She has normal strength.   Skin: Skin is warm, dry and intact.   Psychiatric: She has a normal mood and affect. Her behavior is normal.   Vitals reviewed.          Assessment/Plan:       1. Aortopulmonary arteriovenous malformation.  I am going to set her up for an echocardiogram and the right heart catheterization.  I am going to send her films to [facility] for 3-D reconstruction to try to better determine if there is more than one communication between the arteriovenous malformation.  I spoke with Dr. Monsivais and showed him the CT images.  If there is just the one communication, he feels that she will benefit from an embolectomy.  He is going to do her right heart catheterization.    2. Hypertension.  Her blood pressure is a bit high today.  She said this morning she checked it and it was 134/78 mmHg at home.  She does not usually see high numbers at home.  I encouraged her to continue to monitor it.    I will see her back after her testing is complete and be in touch with her.    I spoke with Dr. Briseno.  He thinks that if we can embolize the AVM it is reasonable to do so.  He does have concerns that she might still need to have surgery if she has any bleeding into her chest.    Orders Placed This Encounter   Procedures   • Adult Transthoracic Echo Complete W/ Cont if Necessary Per Protocol     Standing Status:   Future     Standing Expiration Date:   3/7/2019     Order Specific Question:   Reason for exam?     Answer:   Dyspnea      Rylee Pennington   Home Medication Instructions ENRIQUE:    Printed on:03/07/18 1100   Medication Information                      oxyCODONE-acetaminophen (PERCOCET) 5-325 MG per tablet  Take 1 tablet by mouth Every 6 (Six) Hours As Needed for Moderate Pain  or Severe Pain  for up to 25 doses. 2 tabs if needed             predniSONE (DELTASONE) 10 MG tablet  6 tabs by mouth day 1, 5 tabs by mouth day 2, continue tapering one tablet daily: Coarse 6 days.                         Philly Davalos MD  03/07/18  11:00 AM

## 2018-03-09 ENCOUNTER — HOSPITAL ENCOUNTER (OUTPATIENT)
Facility: HOSPITAL | Age: 58
Setting detail: HOSPITAL OUTPATIENT SURGERY
Discharge: HOME | End: 2018-03-09
Attending: INTERNAL MEDICINE | Admitting: INTERNAL MEDICINE

## 2018-03-09 VITALS
RESPIRATION RATE: 16 BRPM | HEIGHT: 64 IN | OXYGEN SATURATION: 98 % | HEART RATE: 84 BPM | WEIGHT: 210.13 LBS | DIASTOLIC BLOOD PRESSURE: 84 MMHG | BODY MASS INDEX: 35.87 KG/M2 | TEMPERATURE: 98.5 F | SYSTOLIC BLOOD PRESSURE: 147 MMHG

## 2018-03-09 DIAGNOSIS — R06.09 DOE (DYSPNEA ON EXERTION): ICD-10-CM

## 2018-03-09 DIAGNOSIS — Q27.30 ARTERIOVENOUS MALFORMATION (AVM): ICD-10-CM

## 2018-03-09 LAB
HCT VFR BLDA CALC: 31 % (ref 38–51)
HCT VFR BLDA CALC: 32 % (ref 38–51)
HCT VFR BLDA CALC: 32 % (ref 38–51)
HGB BLDA-MCNC: 10.5 G/DL (ref 12–17)
HGB BLDA-MCNC: 10.9 G/DL (ref 12–17)
HGB BLDA-MCNC: 10.9 G/DL (ref 12–17)
SAO2 % BLDA: 65 % (ref 95–98)
SAO2 % BLDA: 66 % (ref 95–98)
SAO2 % BLDA: 68 % (ref 95–98)
SAO2 % BLDA: 69 % (ref 95–98)
SAO2 % BLDA: 96 % (ref 95–98)

## 2018-03-09 PROCEDURE — 85014 HEMATOCRIT: CPT

## 2018-03-09 PROCEDURE — 75605 CONTRAST EXAM THORACIC AORTA: CPT | Performed by: INTERNAL MEDICINE

## 2018-03-09 PROCEDURE — 93460 R&L HRT ART/VENTRICLE ANGIO: CPT | Performed by: INTERNAL MEDICINE

## 2018-03-09 PROCEDURE — C1887 CATHETER, GUIDING: HCPCS | Performed by: INTERNAL MEDICINE

## 2018-03-09 PROCEDURE — 0 IOPAMIDOL PER 1 ML: Performed by: INTERNAL MEDICINE

## 2018-03-09 PROCEDURE — 93568 NJX CAR CTH NSLC P-ART ANGRP: CPT | Performed by: INTERNAL MEDICINE

## 2018-03-09 PROCEDURE — 25010000002 FENTANYL CITRATE (PF) 100 MCG/2ML SOLUTION: Performed by: INTERNAL MEDICINE

## 2018-03-09 PROCEDURE — 99152 MOD SED SAME PHYS/QHP 5/>YRS: CPT | Performed by: INTERNAL MEDICINE

## 2018-03-09 PROCEDURE — C1894 INTRO/SHEATH, NON-LASER: HCPCS | Performed by: INTERNAL MEDICINE

## 2018-03-09 PROCEDURE — 85018 HEMOGLOBIN: CPT

## 2018-03-09 PROCEDURE — C1769 GUIDE WIRE: HCPCS | Performed by: INTERNAL MEDICINE

## 2018-03-09 PROCEDURE — 25010000002 MIDAZOLAM PER 1 MG: Performed by: INTERNAL MEDICINE

## 2018-03-09 PROCEDURE — 25010000002 HEPARIN (PORCINE) PER 1000 UNITS: Performed by: INTERNAL MEDICINE

## 2018-03-09 PROCEDURE — 99153 MOD SED SAME PHYS/QHP EA: CPT | Performed by: INTERNAL MEDICINE

## 2018-03-09 RX ORDER — LIDOCAINE HYDROCHLORIDE 20 MG/ML
INJECTION, SOLUTION INFILTRATION; PERINEURAL AS NEEDED
Status: DISCONTINUED | OUTPATIENT
Start: 2018-03-09 | End: 2018-03-09 | Stop reason: HOSPADM

## 2018-03-09 RX ORDER — FENTANYL CITRATE 50 UG/ML
INJECTION, SOLUTION INTRAMUSCULAR; INTRAVENOUS AS NEEDED
Status: DISCONTINUED | OUTPATIENT
Start: 2018-03-09 | End: 2018-03-09 | Stop reason: HOSPADM

## 2018-03-09 RX ORDER — SODIUM CHLORIDE 9 MG/ML
75 INJECTION, SOLUTION INTRAVENOUS CONTINUOUS
Status: DISCONTINUED | OUTPATIENT
Start: 2018-03-09 | End: 2018-03-09 | Stop reason: HOSPADM

## 2018-03-09 RX ORDER — SODIUM CHLORIDE 0.9 % (FLUSH) 0.9 %
1-10 SYRINGE (ML) INJECTION AS NEEDED
Status: CANCELLED | OUTPATIENT
Start: 2018-03-09

## 2018-03-09 RX ORDER — PHENTERMINE HYDROCHLORIDE 30 MG/1
37.5 CAPSULE ORAL EVERY MORNING
COMMUNITY
End: 2018-12-11

## 2018-03-09 RX ORDER — SODIUM CHLORIDE 0.9 % (FLUSH) 0.9 %
1-10 SYRINGE (ML) INJECTION AS NEEDED
Status: DISCONTINUED | OUTPATIENT
Start: 2018-03-09 | End: 2018-03-09 | Stop reason: HOSPADM

## 2018-03-09 RX ORDER — LIDOCAINE HYDROCHLORIDE 10 MG/ML
0.1 INJECTION, SOLUTION EPIDURAL; INFILTRATION; INTRACAUDAL; PERINEURAL ONCE AS NEEDED
Status: DISCONTINUED | OUTPATIENT
Start: 2018-03-09 | End: 2018-03-09 | Stop reason: HOSPADM

## 2018-03-09 RX ORDER — SODIUM CHLORIDE 9 MG/ML
100 INJECTION, SOLUTION INTRAVENOUS CONTINUOUS
Status: DISCONTINUED | OUTPATIENT
Start: 2018-03-09 | End: 2018-03-09 | Stop reason: HOSPADM

## 2018-03-09 RX ORDER — MIDAZOLAM HYDROCHLORIDE 1 MG/ML
INJECTION INTRAMUSCULAR; INTRAVENOUS AS NEEDED
Status: DISCONTINUED | OUTPATIENT
Start: 2018-03-09 | End: 2018-03-09 | Stop reason: HOSPADM

## 2018-03-09 RX ORDER — NITROGLYCERIN 5 MG/ML
INJECTION, SOLUTION INTRAVENOUS AS NEEDED
Status: DISCONTINUED | OUTPATIENT
Start: 2018-03-09 | End: 2018-03-09 | Stop reason: HOSPADM

## 2018-03-09 RX ADMIN — SODIUM CHLORIDE 75 ML/HR: 9 INJECTION, SOLUTION INTRAVENOUS at 08:01

## 2018-03-09 NOTE — DISCHARGE INSTRUCTIONS
[facility]  [address] [place] [address]    Coronary Angiogram (Radial/Ulnar Approach) After Care    Refer to this sheet in the next few weeks. These instructions provide you with information on caring for yourself after your procedure. Your caregiver may also give you more specific instructions. Your treatment has been planned according to current medical practices, but problems sometimes occur. Call your caregiver if you have any problems or questions after your procedure.    Home Care Instructions:  · You may shower the day after the procedure. Remove the bandage (dressing) and gently wash the site with plain soap and water. Gently pat the site dry. You may apply a band aid daily for 2 days if desired.    · Do not apply powder or lotion to the site.  · Do not submerge the affected site in water for 3 to 5 days or until the site is completely healed.   · Do not lift, push or pull anything over 10 pounds for 2 days after your procedure.  · Inspect the site at least twice daily. You may notice some bruising at the site and it may be tender for 1 to 2 weeks.     · Increase your fluid intake for the next 2 days.    · Keep arm elevated for 24 hours. For the remainder of the day, keep your arm in “Pledge of Allegiance” position when up and about.     · You may drive 24 hours after the procedure unless otherwise instructed by your caregiver.  · Do not operate machinery or power tools for 24 hours.  · A responsible adult should be with you for the first 24 hours after you arrive home. Do not make any important legal decisions or sign legal papers for 24 hours.  Do not drink alcohol for 24 hours.    · Metformin or any medications containing Metformin should not be taken for 48 hours after your procedure.      Call Your Doctor if:   · You have unusual pain at the radial/ulnar (wrist) site.  · You have redness, warmth, swelling, or pain at the radial/ulnar (wrist) site.  · You have drainage (other  than a small amount of blood on the dressing).  · You have chills or a fever > 101.  · Your arm becomes pale or dark, cool, tingly, or numb.  · You have heavy bleeding from the site, hold pressure on the site for 20 minutes.  If the bleeding stops, apply a fresh bandage and call your cardiologist.  However, if you continue to have bleeding, call 911.

## 2018-03-09 NOTE — H&P (VIEW-ONLY)
PATIENTINFORMATION    Date of Office Visit: 2018  Encounter Provider: Philly Davalos MD  Place of Service: [facility] CARDIOLOGY  Patient Name: Rylee Pennington  : 1960    Subjective:     Encounter Date:2018      Patient ID: Rylee Pennington is a 57 y.o. female.      History of Present Illness    This is a lady who was in the emergency room for low back pain in 2017.  They did a CT scan of her abdomen and pelvis due to the back pain and this noted a large pulmonary venous malformation in the left lower lobe, which appeared to be slightly larger than one that was noted in .  Because of this, she was sent for a CT angiogram of the chest in 2017, which showed a large pulmonary vascular malformation involving the descending thoracic aorta, left upper pulmonary artery, and left lower pulmonary artery.  The varice measured up to 2.5 cm.  She was referred to see Dr. Briseno who she saw on 2018, and he felt she would benefit from an echocardiogram and right heart catheterization and possible embolization of the vessel coming off the aorta.  He felt, if this was not possible, surgery could be performed to ligate that vessel.  She has really no symptoms.  She has some shortness of breath with exertion, which she attributes to her weight, but denies chest pain, lower extremity edema, or lightheadedness.  She has no diabetes, hypertension, or hyperlipidemia.  She used to use a CPAP but lost weight after gastric band and no longer uses CPAP.  She does have a history of premature coronary disease in her father and her son.  Her son has had two heart attacks.  His first was in his thirties.       Review of Systems   Constitution: Negative for fever, malaise/fatigue, weight gain and weight loss.   HENT: Negative for ear pain, hearing loss, nosebleeds and sore throat.    Eyes: Negative for double vision, pain, vision loss in left eye and vision loss in right eye.  "  Cardiovascular:        See history of present illness.   Respiratory: Negative for cough, shortness of breath, sleep disturbances due to breathing, snoring and wheezing.    Endocrine: Negative for cold intolerance, heat intolerance and polyuria.   Skin: Negative for itching, poor wound healing and rash.   Musculoskeletal: Negative for joint pain, joint swelling and myalgias.   Gastrointestinal: Negative for abdominal pain, diarrhea, hematochezia, nausea and vomiting.   Genitourinary: Negative for hematuria and hesitancy.   Neurological: Negative for numbness, paresthesias and seizures.   Psychiatric/Behavioral: Negative for depression. The patient is not nervous/anxious.            ECG 12 Lead  Date/Time: 3/7/2018 11:00 AM  Performed by: PAUL BORJA  Authorized by: PAUL BORJA   Previous ECG: no previous ECG available  Rhythm: sinus rhythm  BPM: 98  Conduction: conduction normal  ST Segments: ST segments normal  T Waves: T waves normal  Clinical impression: normal ECG               Objective:     /76 (BP Location: Left arm, Patient Position: Sitting)  Pulse 98  Ht 160 cm (63\")  Wt 96.6 kg (213 lb)  BMI 37.73 kg/m2 Body mass index is 37.73 kg/(m^2).     Physical Exam   Constitutional: She appears well-developed.   HENT:   Head: Normocephalic and atraumatic.   Eyes: Conjunctivae and lids are normal. Pupils are equal, round, and reactive to light. Lids are everted and swept, no foreign bodies found.   Neck: Normal range of motion. No JVD present. Carotid bruit is not present. No tracheal deviation present. No thyroid mass present.   Cardiovascular: Normal rate, regular rhythm and normal heart sounds.    Pulses:       Dorsalis pedis pulses are 2+ on the right side, and 2+ on the left side.   Pulmonary/Chest: Effort normal and breath sounds normal.   She has a loud bruit noted over the left lower lobe   Abdominal: Normal appearance and bowel sounds are normal.   Musculoskeletal: Normal range of " motion.   Neurological: She is alert. She has normal strength.   Skin: Skin is warm, dry and intact.   Psychiatric: She has a normal mood and affect. Her behavior is normal.   Vitals reviewed.          Assessment/Plan:       1. Aortopulmonary arteriovenous malformation.  I am going to set her up for an echocardiogram and the right heart catheterization.  I am going to send her films to [facility] for 3-D reconstruction to try to better determine if there is more than one communication between the arteriovenous malformation.  I spoke with Dr. Monsivais and showed him the CT images.  If there is just the one communication, he feels that she will benefit from an embolectomy.  He is going to do her right heart catheterization.    2. Hypertension.  Her blood pressure is a bit high today.  She said this morning she checked it and it was 134/78 mmHg at home.  She does not usually see high numbers at home.  I encouraged her to continue to monitor it.    I will see her back after her testing is complete and be in touch with her.    I spoke with Dr. Briseno.  He thinks that if we can embolize the AVM it is reasonable to do so.  He does have concerns that she might still need to have surgery if she has any bleeding into her chest.    Orders Placed This Encounter   Procedures   • Adult Transthoracic Echo Complete W/ Cont if Necessary Per Protocol     Standing Status:   Future     Standing Expiration Date:   3/7/2019     Order Specific Question:   Reason for exam?     Answer:   Dyspnea      Rylee Pennington   Home Medication Instructions ENRIQUE:    Printed on:03/07/18 1100   Medication Information                      oxyCODONE-acetaminophen (PERCOCET) 5-325 MG per tablet  Take 1 tablet by mouth Every 6 (Six) Hours As Needed for Moderate Pain  or Severe Pain  for up to 25 doses. 2 tabs if needed             predniSONE (DELTASONE) 10 MG tablet  6 tabs by mouth day 1, 5 tabs by mouth day 2, continue tapering one tablet daily: Coarse 6 days.                         Philly Davalos MD  03/07/18  11:00 AM

## 2018-03-09 NOTE — PLAN OF CARE
Problem: Patient Care Overview (Adult)  Goal: Plan of Care Review  Outcome: Outcome(s) achieved Date Met: 03/09/18 03/09/18 1227   Coping/Psychosocial Response Interventions   Plan Of Care Reviewed With patient;spouse   Patient Care Overview   Progress improving   Outcome Evaluation   Outcome Summary/Follow up Plan ready for discharge     Goal: Adult Individualization and Mutuality  Outcome: Outcome(s) achieved Date Met: 03/09/18    Goal: Discharge Needs Assessment  Outcome: Outcome(s) achieved Date Met: 03/09/18      Problem: Cardiac Catheterization with/without PCI (Adult)  Goal: Signs and Symptoms of Listed Potential Problems Will be Absent or Manageable (Cardiac Catheterization with/without PCI)  Outcome: Outcome(s) achieved Date Met: 03/09/18

## 2018-03-09 NOTE — CONSULTS
Consult order received.  Pt is post cath with no intervention and does not have a coverable diagnosis for Phase II cardiac rehab at this time.  Will not follow.

## 2018-03-09 NOTE — INTERVAL H&P NOTE
H&P reviewed. The patient was examined and there are no changes to the H&P.       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  Here for R/LHC - preop, assess shunt, and image the abnormal blood vessel.

## 2018-03-20 ENCOUNTER — PREP FOR SURGERY (OUTPATIENT)
Dept: OTHER | Facility: HOSPITAL | Age: 58
End: 2018-03-20

## 2018-03-20 ENCOUNTER — HOSPITAL ENCOUNTER (OUTPATIENT)
Dept: CARDIOLOGY | Facility: HOSPITAL | Age: 58
Discharge: HOME | End: 2018-03-20
Attending: INTERNAL MEDICINE | Admitting: INTERNAL MEDICINE

## 2018-03-20 ENCOUNTER — OFFICE VISIT (OUTPATIENT)
Dept: SURGERY | Facility: CLINIC | Age: 58
End: 2018-03-20

## 2018-03-20 VITALS
HEART RATE: 87 BPM | DIASTOLIC BLOOD PRESSURE: 71 MMHG | WEIGHT: 210 LBS | BODY MASS INDEX: 35.85 KG/M2 | HEIGHT: 64 IN | OXYGEN SATURATION: 96 % | SYSTOLIC BLOOD PRESSURE: 135 MMHG

## 2018-03-20 VITALS — HEART RATE: 71 BPM | DIASTOLIC BLOOD PRESSURE: 66 MMHG | SYSTOLIC BLOOD PRESSURE: 116 MMHG

## 2018-03-20 DIAGNOSIS — R06.09 DOE (DYSPNEA ON EXERTION): ICD-10-CM

## 2018-03-20 DIAGNOSIS — I77.89 AV MALFORMATION, ACQUIRED (HCC): Primary | ICD-10-CM

## 2018-03-20 DIAGNOSIS — Q27.30 ARTERIOVENOUS MALFORMATION (AVM): ICD-10-CM

## 2018-03-20 DIAGNOSIS — Q27.30 ARTERIOVENOUS MALFORMATION (AVM): Primary | ICD-10-CM

## 2018-03-20 LAB
ASCENDING AORTA: 3.6 CM
BH CV ECHO MEAS - ACS: 2.4 CM
BH CV ECHO MEAS - AO MAX PG (FULL): 8.4 MMHG
BH CV ECHO MEAS - AO MAX PG: 11.1 MMHG
BH CV ECHO MEAS - AO MEAN PG (FULL): 3.2 MMHG
BH CV ECHO MEAS - AO MEAN PG: 4.8 MMHG
BH CV ECHO MEAS - AO ROOT AREA (BSA CORRECTED): 1.7
BH CV ECHO MEAS - AO ROOT AREA: 9.5 CM^2
BH CV ECHO MEAS - AO ROOT DIAM: 3.5 CM
BH CV ECHO MEAS - AO V2 MAX: 167 CM/SEC
BH CV ECHO MEAS - AO V2 MEAN: 101 CM/SEC
BH CV ECHO MEAS - AO V2 VTI: 33.8 CM
BH CV ECHO MEAS - AVA(I,A): 1.6 CM^2
BH CV ECHO MEAS - AVA(I,D): 1.6 CM^2
BH CV ECHO MEAS - AVA(V,A): 1.6 CM^2
BH CV ECHO MEAS - AVA(V,D): 1.6 CM^2
BH CV ECHO MEAS - BSA(HAYCOCK): 2.1 M^2
BH CV ECHO MEAS - BSA: 2 M^2
BH CV ECHO MEAS - BZI_BMI: 36 KILOGRAMS/M^2
BH CV ECHO MEAS - BZI_METRIC_HEIGHT: 162.6 CM
BH CV ECHO MEAS - BZI_METRIC_WEIGHT: 95.3 KG
BH CV ECHO MEAS - CONTRAST EF (2CH): 56.9 ML/M^2
BH CV ECHO MEAS - CONTRAST EF 4CH: 60.2 ML/M^2
BH CV ECHO MEAS - EDV(MOD-SP2): 109 ML
BH CV ECHO MEAS - EDV(MOD-SP4): 118 ML
BH CV ECHO MEAS - EDV(TEICH): 188.6 ML
BH CV ECHO MEAS - EF(CUBED): 68.4 %
BH CV ECHO MEAS - EF(MOD-SP2): 56.9 %
BH CV ECHO MEAS - EF(MOD-SP4): 60.2 %
BH CV ECHO MEAS - EF(TEICH): 59 %
BH CV ECHO MEAS - ESV(MOD-SP2): 47 ML
BH CV ECHO MEAS - ESV(MOD-SP4): 47 ML
BH CV ECHO MEAS - ESV(TEICH): 77.2 ML
BH CV ECHO MEAS - FS: 31.9 %
BH CV ECHO MEAS - IVS/LVPW: 0.93
BH CV ECHO MEAS - IVSD: 1.1 CM
BH CV ECHO MEAS - LAT PEAK E' VEL: 12 CM/SEC
BH CV ECHO MEAS - LV DIASTOLIC VOL/BSA (35-75): 59.1 ML/M^2
BH CV ECHO MEAS - LV MASS(C)D: 315 GRAMS
BH CV ECHO MEAS - LV MASS(C)DI: 157.7 GRAMS/M^2
BH CV ECHO MEAS - LV MAX PG: 2.7 MMHG
BH CV ECHO MEAS - LV MEAN PG: 1.6 MMHG
BH CV ECHO MEAS - LV SYSTOLIC VOL/BSA (12-30): 23.5 ML/M^2
BH CV ECHO MEAS - LV V1 MAX: 82.5 CM/SEC
BH CV ECHO MEAS - LV V1 MEAN: 59.3 CM/SEC
BH CV ECHO MEAS - LV V1 VTI: 16.9 CM
BH CV ECHO MEAS - LVIDD: 6.1 CM
BH CV ECHO MEAS - LVIDS: 4.2 CM
BH CV ECHO MEAS - LVLD AP2: 7.8 CM
BH CV ECHO MEAS - LVLD AP4: 7.5 CM
BH CV ECHO MEAS - LVLS AP2: 6.4 CM
BH CV ECHO MEAS - LVLS AP4: 6.4 CM
BH CV ECHO MEAS - LVOT AREA (M): 3.1 CM^2
BH CV ECHO MEAS - LVOT AREA: 3.2 CM^2
BH CV ECHO MEAS - LVOT DIAM: 2 CM
BH CV ECHO MEAS - LVPWD: 1.2 CM
BH CV ECHO MEAS - MED PEAK E' VEL: 6 CM/SEC
BH CV ECHO MEAS - MR MAX PG: 30.1 MMHG
BH CV ECHO MEAS - MR MAX VEL: 274.2 CM/SEC
BH CV ECHO MEAS - MV A DUR: 0.1 SEC
BH CV ECHO MEAS - MV A MAX VEL: 87.3 CM/SEC
BH CV ECHO MEAS - MV DEC SLOPE: 447.7 CM/SEC^2
BH CV ECHO MEAS - MV DEC TIME: 0.2 SEC
BH CV ECHO MEAS - MV E MAX VEL: 86.8 CM/SEC
BH CV ECHO MEAS - MV E/A: 0.99
BH CV ECHO MEAS - MV MAX PG: 4.1 MMHG
BH CV ECHO MEAS - MV MEAN PG: 2.2 MMHG
BH CV ECHO MEAS - MV P1/2T MAX VEL: 88.3 CM/SEC
BH CV ECHO MEAS - MV P1/2T: 57.8 MSEC
BH CV ECHO MEAS - MV V2 MAX: 101.8 CM/SEC
BH CV ECHO MEAS - MV V2 MEAN: 69.7 CM/SEC
BH CV ECHO MEAS - MV V2 VTI: 30.4 CM
BH CV ECHO MEAS - MVA P1/2T LCG: 2.5 CM^2
BH CV ECHO MEAS - MVA(P1/2T): 3.8 CM^2
BH CV ECHO MEAS - MVA(VTI): 1.8 CM^2
BH CV ECHO MEAS - PA ACC TIME: 0.1 SEC
BH CV ECHO MEAS - PA MAX PG (FULL): 0.85 MMHG
BH CV ECHO MEAS - PA MAX PG: 3.5 MMHG
BH CV ECHO MEAS - PA PR(ACCEL): 33.1 MMHG
BH CV ECHO MEAS - PA V2 MAX: 93.7 CM/SEC
BH CV ECHO MEAS - PULM A REVS DUR: 0.1 SEC
BH CV ECHO MEAS - PULM A REVS VEL: 20.9 CM/SEC
BH CV ECHO MEAS - PULM DIAS VEL: 48 CM/SEC
BH CV ECHO MEAS - PULM S/D: 1.1
BH CV ECHO MEAS - PULM SYS VEL: 51.9 CM/SEC
BH CV ECHO MEAS - PVA(V,A): 3.2 CM^2
BH CV ECHO MEAS - PVA(V,D): 3.2 CM^2
BH CV ECHO MEAS - QP/QS: 1.2
BH CV ECHO MEAS - RAP SYSTOLE: 3 MMHG
BH CV ECHO MEAS - RV MAX PG: 2.7 MMHG
BH CV ECHO MEAS - RV MEAN PG: 1.6 MMHG
BH CV ECHO MEAS - RV V1 MAX: 81.5 CM/SEC
BH CV ECHO MEAS - RV V1 MEAN: 61.8 CM/SEC
BH CV ECHO MEAS - RV V1 VTI: 17.8 CM
BH CV ECHO MEAS - RVOT AREA: 3.7 CM^2
BH CV ECHO MEAS - RVOT DIAM: 2.2 CM
BH CV ECHO MEAS - RVSP: 31.9 MMHG
BH CV ECHO MEAS - SI(AO): 161.4 ML/M^2
BH CV ECHO MEAS - SI(CUBED): 78.7 ML/M^2
BH CV ECHO MEAS - SI(LVOT): 27.1 ML/M^2
BH CV ECHO MEAS - SI(MOD-SP2): 31 ML/M^2
BH CV ECHO MEAS - SI(MOD-SP4): 35.6 ML/M^2
BH CV ECHO MEAS - SI(TEICH): 55.8 ML/M^2
BH CV ECHO MEAS - SUP REN AO DIAM: 2.1 CM
BH CV ECHO MEAS - SV(AO): 322.3 ML
BH CV ECHO MEAS - SV(CUBED): 157.2 ML
BH CV ECHO MEAS - SV(LVOT): 54.1 ML
BH CV ECHO MEAS - SV(MOD-SP2): 62 ML
BH CV ECHO MEAS - SV(MOD-SP4): 71 ML
BH CV ECHO MEAS - SV(RVOT): 65.6 ML
BH CV ECHO MEAS - SV(TEICH): 111.4 ML
BH CV ECHO MEAS - TAPSE (>1.6): 2.6 CM2
BH CV ECHO MEAS - TR MAX VEL: 268.8 CM/SEC
BH CV XLRA - RV BASE: 2.9 CM
BH CV XLRA - TDI S': 14 CM/SEC
E/E' RATIO: 10.5
LEFT ATRIUM VOLUME INDEX: 25 ML/M2
LV EF 2D ECHO EST: 60 %
SINUS: 2.9 CM
STJ: 3.1 CM

## 2018-03-20 PROCEDURE — 99213 OFFICE O/P EST LOW 20 MIN: CPT | Performed by: THORACIC SURGERY (CARDIOTHORACIC VASCULAR SURGERY)

## 2018-03-20 PROCEDURE — 93306 TTE W/DOPPLER COMPLETE: CPT | Performed by: INTERNAL MEDICINE

## 2018-03-20 PROCEDURE — 93306 TTE W/DOPPLER COMPLETE: CPT

## 2018-03-20 RX ORDER — CEFAZOLIN SODIUM 2 G/100ML
2 INJECTION, SOLUTION INTRAVENOUS ONCE
Status: CANCELLED | OUTPATIENT
Start: 2018-04-26 | End: 2018-04-26

## 2018-03-20 RX ORDER — SODIUM CHLORIDE 0.9 % (FLUSH) 0.9 %
1-10 SYRINGE (ML) INJECTION AS NEEDED
Status: CANCELLED | OUTPATIENT
Start: 2018-04-26

## 2018-03-20 NOTE — PROGRESS NOTES
Subjective   Patient ID: Rylee Pennington is a 57 y.o. female is here today for follow-up.    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  Since her initial evaluation she has been evaluated by Dr. Monsivais of cardiology.  The patient has a very large left to right shunt through her left lower lobe.  There is a large 1 cm arterial branch of the aorta supplying the left lower lobe of the lung causing this shunt.  This was too large to be embolized.  Dr. wade his recommended ligation of this shunt.  The patient is here today to discuss surgical intervention.    She gets short of breath quite easily.  Minimal exertion will cause shortness of breath.  She is having no hemoptysis.  She has no pleuritic pain.  There has been no wheezing    The following portions of the patient's history were reviewed and updated as appropriate: allergies, current medications, past family history, past medical history, past social history, past surgical history and problem list.  Review of Systems   Constitution: Negative.   HENT: Negative.    Eyes: Negative.    Cardiovascular: Negative.    Respiratory: Negative.    Endocrine: Negative.    Hematologic/Lymphatic: Negative.    Skin: Negative.    Musculoskeletal: Negative.    Gastrointestinal: Negative.    Genitourinary: Negative.    Neurological: Negative.    Psychiatric/Behavioral: Negative.      Patient Active Problem List   Diagnosis   • Arteriovenous malformation (AVM)   • BASILIO (dyspnea on exertion)     Past Medical History:   Diagnosis Date   • AVM (arteriovenous malformation)    • Sleep apnea      Past Surgical History:   Procedure Laterality Date   • APPENDECTOMY     • CARDIAC CATHETERIZATION N/A 3/9/2018    Procedure: Right Heart Cath;  Surgeon: Vladislav Monsivais MD;  Location: [facility] INVASIVE LOCATION;  Service: Cardiovascular   • CARDIAC CATHETERIZATION N/A 3/9/2018    Procedure: Left  Heart Cath;  Surgeon: Vladislav Monsivais MD;  Location: [facility] CATH INVASIVE LOCATION;  Service: Cardiovascular   • CARDIAC CATHETERIZATION N/A 3/9/2018    Procedure: Coronary angiography;  Surgeon: Vladislav Monsivais MD;  [facility];  Service: Cardiovascular   • CARDIAC CATHETERIZATION N/A 3/9/2018    Procedure: Left ventriculography;  Surgeon: Vladislav Monsivais MD;  Location: [facility] CATH INVASIVE LOCATION;  Service: Cardiovascular   • CYSTOSCOPY LITHOLAPAXY BLADDER STONE EXTRACTION     • HYSTERECTOMY     • INTERVENTIONAL RADIOLOGY PROCEDURE Bilateral 3/9/2018    Procedure: Pulmonary Angiogram;  Surgeon: Vladislav Monsivais MD;  Location: [facility] CATH INVASIVE LOCATION;  Service: Cardiovascular   • LAPAROSCOPIC GASTRIC BANDING       Family History   Problem Relation Age of Onset   • Melanoma Mother    • Breast cancer Mother    • Lung cancer Father    • Diabetes Father    • Heart disease Father    • Hypertension Sister    • Heart attack Son    • Heart block Son      Social History     Social History   • Marital status:      Spouse name: N/A   • Number of children: N/A   • Years of education: N/A     Occupational History   • Not on file.     Social History Main Topics   • Smoking status: Never Smoker   • Smokeless tobacco: Never Used   • Alcohol use No      Comment: daily caffiene, 1 energy drink    • Drug use: No   • Sexual activity: Defer     Other Topics Concern   • Not on file     Social History Narrative   • No narrative on file       Current Outpatient Prescriptions:   •  phentermine 30 MG capsule, Take 37.5 mg by mouth Every Morning., Disp: , Rfl:   No Known Allergies     Objective   Vitals:    03/20/18 1402   BP: 135/71   Pulse: 87   SpO2: 96%     Physical Exam   Constitutional: She is oriented to person, place, and time. She appears well-developed and well-nourished.   HENT:   Head: Normocephalic.   Eyes: Conjunctivae, EOM and lids are normal. Pupils are equal, round, and reactive to light.   Neck:  Trachea normal and normal range of motion. Neck supple. No hepatojugular reflux and no JVD present. Carotid bruit is not present. No thyroid mass and no thyromegaly present.   Cardiovascular: Normal rate, regular rhythm, S1 normal, S2 normal, normal heart sounds and normal pulses.   No extrasystoles are present. PMI is not displaced.    Pulmonary/Chest: Effort normal and breath sounds normal.   There is a very loud bruit over the entire left lung posteriorly.   Abdominal: Soft. Normal appearance and bowel sounds are normal. She exhibits no mass. There is no hepatosplenomegaly. There is no tenderness. No hernia.   Musculoskeletal: Normal range of motion.   Neurological: She is alert and oriented to person, place, and time. She has normal strength and normal reflexes. No cranial nerve deficit or sensory deficit. She displays a negative Romberg sign.   Skin: Skin is warm, dry and intact.   Psychiatric: She has a normal mood and affect. Her speech is normal and behavior is normal. Judgment and thought content normal. Cognition and memory are normal.     Assessment/Plan       This lady has a very large AV malformation in the left lower lobe of the lung with a very large left to right shunt.  I agree with Dr. Monsivais that this needs to be addressed surgically.  Should the shunt become larger the patient could definitely developed congestive heart failure.  There could be rupture of this AV malformation and  massive hemorrhage.  I have recommended to the patient and her son that she undergo robot assisted ligation of the aberrant artery supplying this AV malformation.  The patient may ultimately require left lower lobectomy as well.  I've explained all this in detail to the patient.  We have discussed the procedure as well as the risks and benefits.  I have answered all of her questions.  She has requested that we proceed.  Arrangements are being made for her to undergo surgery at [facility] in the near future.   I will keep you informed of her progress.  Thank you for allowing me to participate in the care Mrs. Pennington.    Diagnoses and all orders for this visit:    Arteriovenous malformation (AVM)  -     Case request

## 2018-04-10 ENCOUNTER — APPOINTMENT (OUTPATIENT)
Dept: PREADMISSION TESTING | Facility: HOSPITAL | Age: 58
End: 2018-04-10

## 2018-04-10 VITALS
DIASTOLIC BLOOD PRESSURE: 73 MMHG | BODY MASS INDEX: 34.31 KG/M2 | WEIGHT: 201 LBS | OXYGEN SATURATION: 98 % | SYSTOLIC BLOOD PRESSURE: 128 MMHG | HEART RATE: 91 BPM | HEIGHT: 64 IN | RESPIRATION RATE: 18 BRPM | TEMPERATURE: 98.4 F

## 2018-04-10 DIAGNOSIS — I77.89 AV MALFORMATION, ACQUIRED (HCC): ICD-10-CM

## 2018-04-10 LAB
ABO GROUP BLD: NORMAL
ANION GAP SERPL CALCULATED.3IONS-SCNC: 12.2 MMOL/L
BASOPHILS # BLD AUTO: 0.04 10*3/MM3 (ref 0–0.2)
BASOPHILS NFR BLD AUTO: 0.6 % (ref 0–1.5)
BLD GP AB SCN SERPL QL: NEGATIVE
BUN BLD-MCNC: 11 MG/DL (ref 6–20)
BUN/CREAT SERPL: 14.5 (ref 7–25)
CALCIUM SPEC-SCNC: 9.9 MG/DL (ref 8.6–10.5)
CHLORIDE SERPL-SCNC: 103 MMOL/L (ref 98–107)
CO2 SERPL-SCNC: 29.8 MMOL/L (ref 22–29)
CREAT BLD-MCNC: 0.76 MG/DL (ref 0.57–1)
DEPRECATED RDW RBC AUTO: 44.4 FL (ref 37–54)
EOSINOPHIL # BLD AUTO: 0.25 10*3/MM3 (ref 0–0.7)
EOSINOPHIL NFR BLD AUTO: 3.7 % (ref 0.3–6.2)
ERYTHROCYTE [DISTWIDTH] IN BLOOD BY AUTOMATED COUNT: 13.9 % (ref 11.7–13)
GFR SERPL CREATININE-BSD FRML MDRD: 78 ML/MIN/1.73
GLUCOSE BLD-MCNC: 90 MG/DL (ref 65–99)
HCT VFR BLD AUTO: 39.7 % (ref 35.6–45.5)
HGB BLD-MCNC: 12.4 G/DL (ref 11.9–15.5)
IMM GRANULOCYTES # BLD: 0 10*3/MM3 (ref 0–0.03)
IMM GRANULOCYTES NFR BLD: 0 % (ref 0–0.5)
INR PPP: 1.1 (ref 0.9–1.1)
LYMPHOCYTES # BLD AUTO: 1.84 10*3/MM3 (ref 0.9–4.8)
LYMPHOCYTES NFR BLD AUTO: 27 % (ref 19.6–45.3)
MCH RBC QN AUTO: 27.3 PG (ref 26.9–32)
MCHC RBC AUTO-ENTMCNC: 31.2 G/DL (ref 32.4–36.3)
MCV RBC AUTO: 87.4 FL (ref 80.5–98.2)
MONOCYTES # BLD AUTO: 0.57 10*3/MM3 (ref 0.2–1.2)
MONOCYTES NFR BLD AUTO: 8.4 % (ref 5–12)
NEUTROPHILS # BLD AUTO: 4.11 10*3/MM3 (ref 1.9–8.1)
NEUTROPHILS NFR BLD AUTO: 60.3 % (ref 42.7–76)
NRBC BLD MANUAL-RTO: 0 /100 WBC (ref 0–0)
PLATELET # BLD AUTO: 231 10*3/MM3 (ref 140–500)
PMV BLD AUTO: 10.7 FL (ref 6–12)
POTASSIUM BLD-SCNC: 3.5 MMOL/L (ref 3.5–5.2)
PROTHROMBIN TIME: 14 SECONDS (ref 11.7–14.2)
RBC # BLD AUTO: 4.54 10*6/MM3 (ref 3.9–5.2)
RH BLD: POSITIVE
SODIUM BLD-SCNC: 145 MMOL/L (ref 136–145)
T&S EXPIRATION DATE: NORMAL
WBC NRBC COR # BLD: 6.81 10*3/MM3 (ref 4.5–10.7)

## 2018-04-10 PROCEDURE — 85610 PROTHROMBIN TIME: CPT | Performed by: THORACIC SURGERY (CARDIOTHORACIC VASCULAR SURGERY)

## 2018-04-10 PROCEDURE — 80048 BASIC METABOLIC PNL TOTAL CA: CPT | Performed by: THORACIC SURGERY (CARDIOTHORACIC VASCULAR SURGERY)

## 2018-04-10 PROCEDURE — 36415 COLL VENOUS BLD VENIPUNCTURE: CPT

## 2018-04-10 PROCEDURE — 86901 BLOOD TYPING SEROLOGIC RH(D): CPT | Performed by: THORACIC SURGERY (CARDIOTHORACIC VASCULAR SURGERY)

## 2018-04-10 PROCEDURE — 86850 RBC ANTIBODY SCREEN: CPT | Performed by: THORACIC SURGERY (CARDIOTHORACIC VASCULAR SURGERY)

## 2018-04-10 PROCEDURE — 86900 BLOOD TYPING SEROLOGIC ABO: CPT | Performed by: THORACIC SURGERY (CARDIOTHORACIC VASCULAR SURGERY)

## 2018-04-10 PROCEDURE — 85025 COMPLETE CBC W/AUTO DIFF WBC: CPT | Performed by: THORACIC SURGERY (CARDIOTHORACIC VASCULAR SURGERY)

## 2018-04-10 NOTE — DISCHARGE INSTRUCTIONS
Take the following medications the morning of surgery with a small sip of water:        General Instructions:  • Do not eat solid food after midnight the night before surgery.  • You may drink clear liquids day of surgery but must stop at least one hour before your hospital arrival time.  • It is beneficial for you to have a clear drink that contains carbohydrates the day of surgery.  We suggest a 12 to 20 ounce bottle of Gatorade or Powerade for non-diabetic patients or a 12 to 20 ounce bottle of G2 or Powerade Zero for diabetic patients. (Pediatric patients, are not advised to drink a 12 to 20 ounce carbohydrate drink)    Clear liquids are liquids you can see through.  Nothing red in color.     Plain water                               Sports drinks  Sodas                                   Gelatin (Jell-O)  Fruit juices without pulp such as white grape juice and apple juice  Popsicles that contain no fruit or yogurt  Tea or coffee (no cream or milk added)  Gatorade / Powerade  G2 / Powerade Zero    • Infants may have breast milk up to four hours before surgery.  • Infants drinking formula may drink formula up to six hours before surgery.   • Patients who avoid smoking, chewing tobacco and alcohol for 4 weeks prior to surgery have a reduced risk of post-operative complications.  Quit smoking as many days before surgery as you can.  • Do not smoke, use chewing tobacco or drink alcohol the day of surgery.   • If applicable bring your C-PAP/ BI-PAP machine.  • Bring any papers given to you in the doctor’s office.  • Wear clean comfortable clothes and socks.  • Do not wear contact lenses or make-up.  Bring a case for your glasses.   • Bring crutches or walker if applicable.  • Remove all piercings.  Leave jewelry and any other valuables at home.  • Hair extensions with metal clips must be removed prior to surgery.  • The Pre-Admission Testing nurse will instruct you to bring medications if unable to obtain an accurate  list in Pre-Admission Testing.        If you were given a blood bank ID arm band remember to bring it with you the day of surgery.    Preventing a Surgical Site Infection:  • For 2 to 3 days before surgery, avoid shaving with a razor because the razor can irritate skin and make it easier to develop an infection.  • The night prior to surgery sleep in a clean bed with clean clothing.  Do not allow pets to sleep with you.  • Shower on the morning of surgery using a fresh bar of anti-bacterial soap (such as Dial) and clean washcloth.  Dry with a clean towel and dress in clean clothing.  • Ask your surgeon if you will be receiving antibiotics prior to surgery.  • Make sure you, your family, and all healthcare providers clean their hands with soap and water or an alcohol based hand  before caring for you or your wound.    Day of surgery:  Upon arrival, a Pre-op nurse and Anesthesiologist will review your health history, obtain vital signs, and answer questions you may have.  The only belongings needed at this time will be your home medications and if applicable your C-PAP/BI-PAP machine.  If you are staying overnight your family can leave the rest of your belongings in the car and bring them to your room later.  A Pre-op nurse will start an IV and you may receive medication in preparation for surgery, including something to help you relax.  Your family will be able to see you in the Pre-op area.  While you are in surgery your family should notify the waiting room  if they leave the waiting room area and provide a contact phone number.    Please be aware that surgery does come with discomfort.  We want to make every effort to control your discomfort so please discuss any uncontrolled symptoms with your nurse.   Your doctor will most likely have prescribed pain medications.      If you are going home after surgery you will receive individualized written care instructions before being discharged.  A  responsible adult must drive you to and from the hospital on the day of your surgery and stay with you for 24 hours.    If you are staying overnight following surgery, you will be transported to your hospital room following the recovery period.  [facility] has all private rooms.    If you have any questions please call Pre-Admission Testing at [phone].  Deductibles and co-payments are collected on the day of service. Please be prepared to pay the required co-pay, deductible or deposit on the day of service as defined by your plan.

## 2018-04-25 ENCOUNTER — ANESTHESIA EVENT (OUTPATIENT)
Dept: PERIOP | Facility: HOSPITAL | Age: 58
End: 2018-04-25

## 2018-04-26 ENCOUNTER — ANESTHESIA (OUTPATIENT)
Dept: PERIOP | Facility: HOSPITAL | Age: 58
End: 2018-04-26

## 2018-06-01 ENCOUNTER — APPOINTMENT (OUTPATIENT)
Dept: PREADMISSION TESTING | Facility: HOSPITAL | Age: 58
End: 2018-06-01

## 2018-06-01 VITALS
RESPIRATION RATE: 16 BRPM | HEIGHT: 64 IN | SYSTOLIC BLOOD PRESSURE: 141 MMHG | TEMPERATURE: 98.1 F | HEART RATE: 83 BPM | WEIGHT: 195 LBS | BODY MASS INDEX: 33.29 KG/M2 | OXYGEN SATURATION: 96 % | DIASTOLIC BLOOD PRESSURE: 62 MMHG

## 2018-06-01 LAB
DEPRECATED RDW RBC AUTO: 49.1 FL (ref 37–54)
ERYTHROCYTE [DISTWIDTH] IN BLOOD BY AUTOMATED COUNT: 15.6 % (ref 11.7–13)
HCT VFR BLD AUTO: 39.9 % (ref 35.6–45.5)
HGB BLD-MCNC: 12.5 G/DL (ref 11.9–15.5)
MCH RBC QN AUTO: 27.1 PG (ref 26.9–32)
MCHC RBC AUTO-ENTMCNC: 31.3 G/DL (ref 32.4–36.3)
MCV RBC AUTO: 86.6 FL (ref 80.5–98.2)
PLATELET # BLD AUTO: 226 10*3/MM3 (ref 140–500)
PMV BLD AUTO: 10.5 FL (ref 6–12)
RBC # BLD AUTO: 4.61 10*6/MM3 (ref 3.9–5.2)
WBC NRBC COR # BLD: 6.77 10*3/MM3 (ref 4.5–10.7)

## 2018-06-01 PROCEDURE — 85027 COMPLETE CBC AUTOMATED: CPT | Performed by: THORACIC SURGERY (CARDIOTHORACIC VASCULAR SURGERY)

## 2018-06-01 PROCEDURE — 36415 COLL VENOUS BLD VENIPUNCTURE: CPT

## 2018-06-01 NOTE — DISCHARGE INSTRUCTIONS
Take the following medications the morning of surgery with a small sip of water:    NONE    General Instructions:  • Do not eat solid food after midnight the night before surgery.  • You may drink clear liquids day of surgery but must stop at least one hour before your hospital arrival time.   ( 0430 AM )  • It is beneficial for you to have a clear drink that contains carbohydrates the day of surgery.  We suggest a 12 to 20 ounce bottle of Gatorade or Powerade for non-diabetic patients     Clear liquids are liquids you can see through.  Nothing red in color.     Plain water                               Sports drinks  Sodas                                   Gelatin (Jell-O)  Fruit juices without pulp such as white grape juice and apple juice  Popsicles that contain no fruit or yogurt  Tea or coffee (no cream or milk added)  Gatorade / Powerade  G2 / Powerade Zero    • If applicable bring your C-PAP/ BI-PAP machine.  • Bring any papers given to you in the doctor’s office.  • Wear clean comfortable clothes and socks.  • Do not wear contact lenses or make-up.  Bring a case for your glasses.   • Remove all piercings.  Leave jewelry and any other valuables at home.  • Hair extensions with metal clips must be removed prior to surgery.  • The Pre-Admission Testing nurse will instruct you to bring medications if unable to obtain an accurate list in Pre-Admission Testing.  • REPORT TO MAIN SURGERY ON 6- AT 0530 AM          Preventing a Surgical Site Infection:  • For 2 to 3 days before surgery, avoid shaving with a razor because the razor can irritate skin and make it easier to develop an infection.  • The night prior to surgery sleep in a clean bed with clean clothing.  Do not allow pets to sleep with you.  • Shower on the morning of surgery using a fresh bar of anti-bacterial soap (such as Dial) and clean washcloth.  Dry with a clean towel and dress in clean clothing.  • Ask your surgeon if you will be receiving  antibiotics prior to surgery.  • Make sure you, your family, and all healthcare providers clean their hands with soap and water or an alcohol based hand  before caring for you or your wound.    Day of surgery:  Upon arrival, a Pre-op nurse and Anesthesiologist will review your health history, obtain vital signs, and answer questions you may have.  The only belongings needed at this time will be your home medications and if applicable your C-PAP/BI-PAP machine.  If you are staying overnight your family can leave the rest of your belongings in the car and bring them to your room later.  A Pre-op nurse will start an IV and you may receive medication in preparation for surgery, including something to help you relax.  Your family will be able to see you in the Pre-op area.  While you are in surgery your family should notify the waiting room  if they leave the waiting room area and provide a contact phone number.    Please be aware that surgery does come with discomfort.  We want to make every effort to control your discomfort so please discuss any uncontrolled symptoms with your nurse.   Your doctor will most likely have prescribed pain medications.          If you are staying overnight following surgery, you will be transported to your hospital room following the recovery period.   has all private rooms.    If you have any questions please call Pre-Admission Testing at [phone].  Deductibles and co-payments are collected on the day of service. Please be prepared to pay the required co-pay, deductible or deposit on the day of service as defined by your plan.

## 2018-06-15 ENCOUNTER — APPOINTMENT (OUTPATIENT)
Dept: GENERAL RADIOLOGY | Facility: HOSPITAL | Age: 58
End: 2018-06-15

## 2018-06-15 ENCOUNTER — HOSPITAL ENCOUNTER (INPATIENT)
Facility: HOSPITAL | Age: 58
LOS: 1 days | Discharge: HOME | End: 2018-06-16
Attending: THORACIC SURGERY (CARDIOTHORACIC VASCULAR SURGERY) | Admitting: THORACIC SURGERY (CARDIOTHORACIC VASCULAR SURGERY)

## 2018-06-15 DIAGNOSIS — Q27.30 ARTERIOVENOUS MALFORMATION (AVM): Primary | ICD-10-CM

## 2018-06-15 DIAGNOSIS — I77.89 AV MALFORMATION, ACQUIRED (HCC): ICD-10-CM

## 2018-06-15 LAB
ABO GROUP BLD: NORMAL
BLD GP AB SCN SERPL QL: NEGATIVE
HBV SURFACE AB SER RIA-ACNC: NORMAL
HBV SURFACE AG SERPL QL IA: NORMAL
HCV AB SER DONR QL: NORMAL
HIV1 P24 AG SER QL: NORMAL
HIV1+2 AB SER QL: NORMAL
RH BLD: POSITIVE
T&S EXPIRATION DATE: NORMAL

## 2018-06-15 PROCEDURE — 25010000003 CEFAZOLIN IN DEXTROSE 2-4 GM/100ML-% SOLUTION: Performed by: THORACIC SURGERY (CARDIOTHORACIC VASCULAR SURGERY)

## 2018-06-15 PROCEDURE — 25010000002 KETOROLAC TROMETHAMINE PER 15 MG: Performed by: NURSE PRACTITIONER

## 2018-06-15 PROCEDURE — 8E0W4CZ ROBOTIC ASSISTED PROCEDURE OF TRUNK REGION, PERCUTANEOUS ENDOSCOPIC APPROACH: ICD-10-PCS | Performed by: THORACIC SURGERY (CARDIOTHORACIC VASCULAR SURGERY)

## 2018-06-15 PROCEDURE — 32662 THORACOSCOPY W/MEDIAST EXC: CPT | Performed by: THORACIC SURGERY (CARDIOTHORACIC VASCULAR SURGERY)

## 2018-06-15 PROCEDURE — 94799 UNLISTED PULMONARY SVC/PX: CPT

## 2018-06-15 PROCEDURE — 86706 HEP B SURFACE ANTIBODY: CPT | Performed by: THORACIC SURGERY (CARDIOTHORACIC VASCULAR SURGERY)

## 2018-06-15 PROCEDURE — 86900 BLOOD TYPING SEROLOGIC ABO: CPT | Performed by: THORACIC SURGERY (CARDIOTHORACIC VASCULAR SURGERY)

## 2018-06-15 PROCEDURE — 25010000002 FENTANYL CITRATE (PF) 100 MCG/2ML SOLUTION: Performed by: ANESTHESIOLOGY

## 2018-06-15 PROCEDURE — 87340 HEPATITIS B SURFACE AG IA: CPT | Performed by: THORACIC SURGERY (CARDIOTHORACIC VASCULAR SURGERY)

## 2018-06-15 PROCEDURE — 25010000002 HYDROMORPHONE PER 4 MG: Performed by: NURSE ANESTHETIST, CERTIFIED REGISTERED

## 2018-06-15 PROCEDURE — 25010000002 MIDAZOLAM PER 1 MG: Performed by: ANESTHESIOLOGY

## 2018-06-15 PROCEDURE — 0BJ08ZZ INSPECTION OF TRACHEOBRONCHIAL TREE, VIA NATURAL OR ARTIFICIAL OPENING ENDOSCOPIC: ICD-10-PCS | Performed by: THORACIC SURGERY (CARDIOTHORACIC VASCULAR SURGERY)

## 2018-06-15 PROCEDURE — 25010000002 PHENYLEPHRINE PER 1 ML: Performed by: NURSE ANESTHETIST, CERTIFIED REGISTERED

## 2018-06-15 PROCEDURE — 25010000002 ONDANSETRON PER 1 MG: Performed by: NURSE ANESTHETIST, CERTIFIED REGISTERED

## 2018-06-15 PROCEDURE — 02LR4ZZ OCCLUSION OF LEFT PULMONARY ARTERY, PERCUTANEOUS ENDOSCOPIC APPROACH: ICD-10-PCS | Performed by: THORACIC SURGERY (CARDIOTHORACIC VASCULAR SURGERY)

## 2018-06-15 PROCEDURE — 25010000002 PROPOFOL 10 MG/ML EMULSION: Performed by: NURSE ANESTHETIST, CERTIFIED REGISTERED

## 2018-06-15 PROCEDURE — C2618 PROBE/NEEDLE, CRYO: HCPCS | Performed by: THORACIC SURGERY (CARDIOTHORACIC VASCULAR SURGERY)

## 2018-06-15 PROCEDURE — 25010000002 FENTANYL CITRATE (PF) 100 MCG/2ML SOLUTION: Performed by: NURSE ANESTHETIST, CERTIFIED REGISTERED

## 2018-06-15 PROCEDURE — G0432 EIA HIV-1/HIV-2 SCREEN: HCPCS | Performed by: THORACIC SURGERY (CARDIOTHORACIC VASCULAR SURGERY)

## 2018-06-15 PROCEDURE — 86901 BLOOD TYPING SEROLOGIC RH(D): CPT | Performed by: THORACIC SURGERY (CARDIOTHORACIC VASCULAR SURGERY)

## 2018-06-15 PROCEDURE — 25010000002 SUCCINYLCHOLINE PER 20 MG: Performed by: NURSE ANESTHETIST, CERTIFIED REGISTERED

## 2018-06-15 PROCEDURE — 25010000002 DEXAMETHASONE PER 1 MG: Performed by: NURSE ANESTHETIST, CERTIFIED REGISTERED

## 2018-06-15 PROCEDURE — 71045 X-RAY EXAM CHEST 1 VIEW: CPT

## 2018-06-15 PROCEDURE — 25010000002 KETOROLAC TROMETHAMINE PER 15 MG: Performed by: NURSE ANESTHETIST, CERTIFIED REGISTERED

## 2018-06-15 PROCEDURE — 86850 RBC ANTIBODY SCREEN: CPT | Performed by: THORACIC SURGERY (CARDIOTHORACIC VASCULAR SURGERY)

## 2018-06-15 PROCEDURE — 86803 HEPATITIS C AB TEST: CPT | Performed by: THORACIC SURGERY (CARDIOTHORACIC VASCULAR SURGERY)

## 2018-06-15 PROCEDURE — 87899 AGENT NOS ASSAY W/OPTIC: CPT | Performed by: THORACIC SURGERY (CARDIOTHORACIC VASCULAR SURGERY)

## 2018-06-15 PROCEDURE — S0260 H&P FOR SURGERY: HCPCS | Performed by: THORACIC SURGERY (CARDIOTHORACIC VASCULAR SURGERY)

## 2018-06-15 PROCEDURE — 3E0T3BZ INTRODUCTION OF ANESTHETIC AGENT INTO PERIPHERAL NERVES AND PLEXI, PERCUTANEOUS APPROACH: ICD-10-PCS | Performed by: THORACIC SURGERY (CARDIOTHORACIC VASCULAR SURGERY)

## 2018-06-15 RX ORDER — MIDAZOLAM HYDROCHLORIDE 1 MG/ML
2 INJECTION INTRAMUSCULAR; INTRAVENOUS
Status: DISCONTINUED | OUTPATIENT
Start: 2018-06-15 | End: 2018-06-15 | Stop reason: HOSPADM

## 2018-06-15 RX ORDER — SODIUM CHLORIDE 9 MG/ML
75 INJECTION, SOLUTION INTRAVENOUS CONTINUOUS
Status: ACTIVE | OUTPATIENT
Start: 2018-06-15 | End: 2018-06-16

## 2018-06-15 RX ORDER — MORPHINE SULFATE 2 MG/ML
2 INJECTION, SOLUTION INTRAMUSCULAR; INTRAVENOUS EVERY 4 HOURS PRN
Status: DISCONTINUED | OUTPATIENT
Start: 2018-06-15 | End: 2018-06-16 | Stop reason: HOSPADM

## 2018-06-15 RX ORDER — PROMETHAZINE HYDROCHLORIDE 25 MG/ML
12.5 INJECTION, SOLUTION INTRAMUSCULAR; INTRAVENOUS ONCE AS NEEDED
Status: DISCONTINUED | OUTPATIENT
Start: 2018-06-15 | End: 2018-06-15 | Stop reason: HOSPADM

## 2018-06-15 RX ORDER — PROMETHAZINE HYDROCHLORIDE 25 MG/1
25 SUPPOSITORY RECTAL ONCE AS NEEDED
Status: DISCONTINUED | OUTPATIENT
Start: 2018-06-15 | End: 2018-06-15 | Stop reason: HOSPADM

## 2018-06-15 RX ORDER — LABETALOL HYDROCHLORIDE 5 MG/ML
5 INJECTION, SOLUTION INTRAVENOUS
Status: DISCONTINUED | OUTPATIENT
Start: 2018-06-15 | End: 2018-06-15 | Stop reason: HOSPADM

## 2018-06-15 RX ORDER — BISACODYL 10 MG
10 SUPPOSITORY, RECTAL RECTAL DAILY PRN
Status: DISCONTINUED | OUTPATIENT
Start: 2018-06-15 | End: 2018-06-16 | Stop reason: HOSPADM

## 2018-06-15 RX ORDER — NITROGLYCERIN 0.4 MG/1
0.4 TABLET SUBLINGUAL
Status: DISCONTINUED | OUTPATIENT
Start: 2018-06-15 | End: 2018-06-16 | Stop reason: HOSPADM

## 2018-06-15 RX ORDER — SODIUM CHLORIDE 0.9 % (FLUSH) 0.9 %
1-10 SYRINGE (ML) INJECTION AS NEEDED
Status: DISCONTINUED | OUTPATIENT
Start: 2018-06-15 | End: 2018-06-15 | Stop reason: HOSPADM

## 2018-06-15 RX ORDER — PROPOFOL 10 MG/ML
VIAL (ML) INTRAVENOUS AS NEEDED
Status: DISCONTINUED | OUTPATIENT
Start: 2018-06-15 | End: 2018-06-15 | Stop reason: SURG

## 2018-06-15 RX ORDER — SODIUM CHLORIDE, SODIUM LACTATE, POTASSIUM CHLORIDE, CALCIUM CHLORIDE 600; 310; 30; 20 MG/100ML; MG/100ML; MG/100ML; MG/100ML
9 INJECTION, SOLUTION INTRAVENOUS CONTINUOUS
Status: DISCONTINUED | OUTPATIENT
Start: 2018-06-15 | End: 2018-06-15

## 2018-06-15 RX ORDER — FENTANYL CITRATE 50 UG/ML
INJECTION, SOLUTION INTRAMUSCULAR; INTRAVENOUS AS NEEDED
Status: DISCONTINUED | OUTPATIENT
Start: 2018-06-15 | End: 2018-06-15 | Stop reason: SURG

## 2018-06-15 RX ORDER — OXYCODONE AND ACETAMINOPHEN 7.5; 325 MG/1; MG/1
1 TABLET ORAL ONCE AS NEEDED
Status: DISCONTINUED | OUTPATIENT
Start: 2018-06-15 | End: 2018-06-15 | Stop reason: HOSPADM

## 2018-06-15 RX ORDER — MIDAZOLAM HYDROCHLORIDE 1 MG/ML
1 INJECTION INTRAMUSCULAR; INTRAVENOUS
Status: DISCONTINUED | OUTPATIENT
Start: 2018-06-15 | End: 2018-06-15 | Stop reason: HOSPADM

## 2018-06-15 RX ORDER — HEPARIN SODIUM 5000 [USP'U]/ML
5000 INJECTION, SOLUTION INTRAVENOUS; SUBCUTANEOUS EVERY 12 HOURS SCHEDULED
Status: DISCONTINUED | OUTPATIENT
Start: 2018-06-16 | End: 2018-06-16 | Stop reason: HOSPADM

## 2018-06-15 RX ORDER — MORPHINE SULFATE 2 MG/ML
4 INJECTION, SOLUTION INTRAMUSCULAR; INTRAVENOUS EVERY 4 HOURS PRN
Status: DISCONTINUED | OUTPATIENT
Start: 2018-06-15 | End: 2018-06-16 | Stop reason: HOSPADM

## 2018-06-15 RX ORDER — ONDANSETRON 2 MG/ML
INJECTION INTRAMUSCULAR; INTRAVENOUS AS NEEDED
Status: DISCONTINUED | OUTPATIENT
Start: 2018-06-15 | End: 2018-06-15 | Stop reason: SURG

## 2018-06-15 RX ORDER — KETOROLAC TROMETHAMINE 30 MG/ML
INJECTION, SOLUTION INTRAMUSCULAR; INTRAVENOUS AS NEEDED
Status: DISCONTINUED | OUTPATIENT
Start: 2018-06-15 | End: 2018-06-15 | Stop reason: SURG

## 2018-06-15 RX ORDER — FAMOTIDINE 10 MG/ML
20 INJECTION, SOLUTION INTRAVENOUS ONCE
Status: COMPLETED | OUTPATIENT
Start: 2018-06-15 | End: 2018-06-15

## 2018-06-15 RX ORDER — DOCUSATE SODIUM 100 MG/1
100 CAPSULE, LIQUID FILLED ORAL 2 TIMES DAILY
Status: DISCONTINUED | OUTPATIENT
Start: 2018-06-15 | End: 2018-06-16 | Stop reason: HOSPADM

## 2018-06-15 RX ORDER — FLUMAZENIL 0.1 MG/ML
0.2 INJECTION INTRAVENOUS AS NEEDED
Status: DISCONTINUED | OUTPATIENT
Start: 2018-06-15 | End: 2018-06-15 | Stop reason: HOSPADM

## 2018-06-15 RX ORDER — SODIUM CHLORIDE 0.9 % (FLUSH) 0.9 %
1-10 SYRINGE (ML) INJECTION AS NEEDED
Status: DISCONTINUED | OUTPATIENT
Start: 2018-06-15 | End: 2018-06-16 | Stop reason: HOSPADM

## 2018-06-15 RX ORDER — GLYCOPYRROLATE 0.2 MG/ML
INJECTION INTRAMUSCULAR; INTRAVENOUS AS NEEDED
Status: DISCONTINUED | OUTPATIENT
Start: 2018-06-15 | End: 2018-06-15 | Stop reason: SURG

## 2018-06-15 RX ORDER — SUCCINYLCHOLINE CHLORIDE 20 MG/ML
INJECTION INTRAMUSCULAR; INTRAVENOUS AS NEEDED
Status: DISCONTINUED | OUTPATIENT
Start: 2018-06-15 | End: 2018-06-15 | Stop reason: SURG

## 2018-06-15 RX ORDER — ALBUTEROL SULFATE 2.5 MG/3ML
2.5 SOLUTION RESPIRATORY (INHALATION) ONCE AS NEEDED
Status: DISCONTINUED | OUTPATIENT
Start: 2018-06-15 | End: 2018-06-15 | Stop reason: HOSPADM

## 2018-06-15 RX ORDER — EPHEDRINE SULFATE 50 MG/ML
5 INJECTION, SOLUTION INTRAVENOUS ONCE AS NEEDED
Status: DISCONTINUED | OUTPATIENT
Start: 2018-06-15 | End: 2018-06-15 | Stop reason: HOSPADM

## 2018-06-15 RX ORDER — BUPIVACAINE HYDROCHLORIDE 2.5 MG/ML
INJECTION, SOLUTION EPIDURAL; INFILTRATION; INTRACAUDAL AS NEEDED
Status: DISCONTINUED | OUTPATIENT
Start: 2018-06-15 | End: 2018-06-15 | Stop reason: HOSPADM

## 2018-06-15 RX ORDER — ACETAMINOPHEN 325 MG/1
650 TABLET ORAL EVERY 4 HOURS PRN
Status: DISCONTINUED | OUTPATIENT
Start: 2018-06-15 | End: 2018-06-16 | Stop reason: HOSPADM

## 2018-06-15 RX ORDER — CEFAZOLIN SODIUM 2 G/100ML
2 INJECTION, SOLUTION INTRAVENOUS EVERY 8 HOURS
Status: COMPLETED | OUTPATIENT
Start: 2018-06-15 | End: 2018-06-15

## 2018-06-15 RX ORDER — NALOXONE HCL 0.4 MG/ML
0.4 VIAL (ML) INJECTION
Status: DISCONTINUED | OUTPATIENT
Start: 2018-06-15 | End: 2018-06-16 | Stop reason: HOSPADM

## 2018-06-15 RX ORDER — HYDROCODONE BITARTRATE AND ACETAMINOPHEN 7.5; 325 MG/1; MG/1
1 TABLET ORAL ONCE AS NEEDED
Status: DISCONTINUED | OUTPATIENT
Start: 2018-06-15 | End: 2018-06-15 | Stop reason: HOSPADM

## 2018-06-15 RX ORDER — HYDROMORPHONE HYDROCHLORIDE 1 MG/ML
0.5 INJECTION, SOLUTION INTRAMUSCULAR; INTRAVENOUS; SUBCUTANEOUS
Status: DISCONTINUED | OUTPATIENT
Start: 2018-06-15 | End: 2018-06-15 | Stop reason: HOSPADM

## 2018-06-15 RX ORDER — ONDANSETRON 2 MG/ML
4 INJECTION INTRAMUSCULAR; INTRAVENOUS EVERY 6 HOURS PRN
Status: DISCONTINUED | OUTPATIENT
Start: 2018-06-15 | End: 2018-06-16 | Stop reason: HOSPADM

## 2018-06-15 RX ORDER — MAGNESIUM HYDROXIDE 1200 MG/15ML
LIQUID ORAL AS NEEDED
Status: DISCONTINUED | OUTPATIENT
Start: 2018-06-15 | End: 2018-06-15 | Stop reason: HOSPADM

## 2018-06-15 RX ORDER — LIDOCAINE HYDROCHLORIDE 20 MG/ML
INJECTION, SOLUTION INFILTRATION; PERINEURAL AS NEEDED
Status: DISCONTINUED | OUTPATIENT
Start: 2018-06-15 | End: 2018-06-15 | Stop reason: SURG

## 2018-06-15 RX ORDER — ONDANSETRON 4 MG/1
4 TABLET, FILM COATED ORAL EVERY 6 HOURS PRN
Status: DISCONTINUED | OUTPATIENT
Start: 2018-06-15 | End: 2018-06-16 | Stop reason: HOSPADM

## 2018-06-15 RX ORDER — PROMETHAZINE HYDROCHLORIDE 25 MG/1
25 TABLET ORAL ONCE AS NEEDED
Status: DISCONTINUED | OUTPATIENT
Start: 2018-06-15 | End: 2018-06-15 | Stop reason: HOSPADM

## 2018-06-15 RX ORDER — ONDANSETRON 2 MG/ML
4 INJECTION INTRAMUSCULAR; INTRAVENOUS ONCE AS NEEDED
Status: DISCONTINUED | OUTPATIENT
Start: 2018-06-15 | End: 2018-06-15 | Stop reason: HOSPADM

## 2018-06-15 RX ORDER — ROCURONIUM BROMIDE 10 MG/ML
INJECTION, SOLUTION INTRAVENOUS AS NEEDED
Status: DISCONTINUED | OUTPATIENT
Start: 2018-06-15 | End: 2018-06-15 | Stop reason: SURG

## 2018-06-15 RX ORDER — SODIUM CHLORIDE 9 MG/ML
INJECTION, SOLUTION INTRAVENOUS AS NEEDED
Status: DISCONTINUED | OUTPATIENT
Start: 2018-06-15 | End: 2018-06-15 | Stop reason: HOSPADM

## 2018-06-15 RX ORDER — CELECOXIB 200 MG/1
200 CAPSULE ORAL DAILY
Status: DISCONTINUED | OUTPATIENT
Start: 2018-06-16 | End: 2018-06-16 | Stop reason: HOSPADM

## 2018-06-15 RX ORDER — CEFAZOLIN SODIUM 2 G/100ML
2 INJECTION, SOLUTION INTRAVENOUS ONCE
Status: COMPLETED | OUTPATIENT
Start: 2018-06-15 | End: 2018-06-15

## 2018-06-15 RX ORDER — HYDROCODONE BITARTRATE AND ACETAMINOPHEN 7.5; 325 MG/1; MG/1
1 TABLET ORAL EVERY 4 HOURS PRN
Status: DISCONTINUED | OUTPATIENT
Start: 2018-06-15 | End: 2018-06-16 | Stop reason: HOSPADM

## 2018-06-15 RX ORDER — GABAPENTIN 300 MG/1
300 CAPSULE ORAL 3 TIMES DAILY
Status: DISCONTINUED | OUTPATIENT
Start: 2018-06-15 | End: 2018-06-16 | Stop reason: HOSPADM

## 2018-06-15 RX ORDER — NALOXONE HCL 0.4 MG/ML
0.2 VIAL (ML) INJECTION AS NEEDED
Status: DISCONTINUED | OUTPATIENT
Start: 2018-06-15 | End: 2018-06-15 | Stop reason: HOSPADM

## 2018-06-15 RX ORDER — FENTANYL CITRATE 50 UG/ML
50 INJECTION, SOLUTION INTRAMUSCULAR; INTRAVENOUS
Status: DISCONTINUED | OUTPATIENT
Start: 2018-06-15 | End: 2018-06-15 | Stop reason: HOSPADM

## 2018-06-15 RX ORDER — HYDROCODONE BITARTRATE AND ACETAMINOPHEN 7.5; 325 MG/1; MG/1
2 TABLET ORAL EVERY 4 HOURS PRN
Status: DISCONTINUED | OUTPATIENT
Start: 2018-06-15 | End: 2018-06-16 | Stop reason: HOSPADM

## 2018-06-15 RX ORDER — IPRATROPIUM BROMIDE AND ALBUTEROL SULFATE 2.5; .5 MG/3ML; MG/3ML
3 SOLUTION RESPIRATORY (INHALATION)
Status: DISCONTINUED | OUTPATIENT
Start: 2018-06-15 | End: 2018-06-16 | Stop reason: HOSPADM

## 2018-06-15 RX ORDER — FENTANYL CITRATE 50 UG/ML
50 INJECTION, SOLUTION INTRAMUSCULAR; INTRAVENOUS
Status: COMPLETED | OUTPATIENT
Start: 2018-06-15 | End: 2018-06-15

## 2018-06-15 RX ORDER — DEXAMETHASONE SODIUM PHOSPHATE 10 MG/ML
INJECTION INTRAMUSCULAR; INTRAVENOUS AS NEEDED
Status: DISCONTINUED | OUTPATIENT
Start: 2018-06-15 | End: 2018-06-15 | Stop reason: SURG

## 2018-06-15 RX ORDER — KETOROLAC TROMETHAMINE 30 MG/ML
30 INJECTION, SOLUTION INTRAMUSCULAR; INTRAVENOUS EVERY 6 HOURS PRN
Status: DISCONTINUED | OUTPATIENT
Start: 2018-06-15 | End: 2018-06-16 | Stop reason: HOSPADM

## 2018-06-15 RX ORDER — PROMETHAZINE HYDROCHLORIDE 25 MG/1
12.5 TABLET ORAL ONCE AS NEEDED
Status: DISCONTINUED | OUTPATIENT
Start: 2018-06-15 | End: 2018-06-15 | Stop reason: HOSPADM

## 2018-06-15 RX ORDER — SENNA AND DOCUSATE SODIUM 50; 8.6 MG/1; MG/1
2 TABLET, FILM COATED ORAL NIGHTLY
Status: DISCONTINUED | OUTPATIENT
Start: 2018-06-15 | End: 2018-06-16 | Stop reason: HOSPADM

## 2018-06-15 RX ORDER — DIPHENHYDRAMINE HYDROCHLORIDE 50 MG/ML
12.5 INJECTION INTRAMUSCULAR; INTRAVENOUS
Status: DISCONTINUED | OUTPATIENT
Start: 2018-06-15 | End: 2018-06-15 | Stop reason: HOSPADM

## 2018-06-15 RX ORDER — ONDANSETRON 4 MG/1
4 TABLET, ORALLY DISINTEGRATING ORAL EVERY 6 HOURS PRN
Status: DISCONTINUED | OUTPATIENT
Start: 2018-06-15 | End: 2018-06-16 | Stop reason: HOSPADM

## 2018-06-15 RX ADMIN — SODIUM CHLORIDE 75 ML/HR: 9 INJECTION, SOLUTION INTRAVENOUS at 13:30

## 2018-06-15 RX ADMIN — MIDAZOLAM 2 MG: 1 INJECTION INTRAMUSCULAR; INTRAVENOUS at 06:52

## 2018-06-15 RX ADMIN — KETOROLAC TROMETHAMINE 30 MG: 30 INJECTION, SOLUTION INTRAMUSCULAR; INTRAVENOUS at 09:23

## 2018-06-15 RX ADMIN — FENTANYL CITRATE 50 MCG: 50 INJECTION, SOLUTION INTRAMUSCULAR; INTRAVENOUS at 06:52

## 2018-06-15 RX ADMIN — FENTANYL CITRATE 50 MCG: 50 INJECTION INTRAMUSCULAR; INTRAVENOUS at 09:56

## 2018-06-15 RX ADMIN — SUCCINYLCHOLINE CHLORIDE 100 MG: 20 INJECTION, SOLUTION INTRAMUSCULAR; INTRAVENOUS; PARENTERAL at 07:37

## 2018-06-15 RX ADMIN — GABAPENTIN 300 MG: 300 CAPSULE ORAL at 16:05

## 2018-06-15 RX ADMIN — HYDROCODONE BITARTRATE AND ACETAMINOPHEN 1 TABLET: 7.5; 325 TABLET ORAL at 13:24

## 2018-06-15 RX ADMIN — DEXAMETHASONE SODIUM PHOSPHATE 8 MG: 10 INJECTION INTRAMUSCULAR; INTRAVENOUS at 08:37

## 2018-06-15 RX ADMIN — DOCUSATE SODIUM -SENNOSIDES 2 TABLET: 50; 8.6 TABLET, COATED ORAL at 20:32

## 2018-06-15 RX ADMIN — FAMOTIDINE 20 MG: 10 INJECTION, SOLUTION INTRAVENOUS at 06:52

## 2018-06-15 RX ADMIN — ONDANSETRON 4 MG: 2 INJECTION INTRAMUSCULAR; INTRAVENOUS at 09:10

## 2018-06-15 RX ADMIN — PHENYLEPHRINE HYDROCHLORIDE 100 MCG: 10 INJECTION INTRAVENOUS at 08:42

## 2018-06-15 RX ADMIN — SUGAMMADEX 200 MG: 100 INJECTION, SOLUTION INTRAVENOUS at 09:29

## 2018-06-15 RX ADMIN — DOCUSATE SODIUM 100 MG: 100 CAPSULE, LIQUID FILLED ORAL at 20:32

## 2018-06-15 RX ADMIN — FENTANYL CITRATE 50 MCG: 50 INJECTION INTRAMUSCULAR; INTRAVENOUS at 08:18

## 2018-06-15 RX ADMIN — KETOROLAC TROMETHAMINE 30 MG: 30 INJECTION, SOLUTION INTRAMUSCULAR; INTRAVENOUS at 16:21

## 2018-06-15 RX ADMIN — SODIUM CHLORIDE, POTASSIUM CHLORIDE, SODIUM LACTATE AND CALCIUM CHLORIDE 9 ML/HR: 600; 310; 30; 20 INJECTION, SOLUTION INTRAVENOUS at 06:52

## 2018-06-15 RX ADMIN — CEFAZOLIN SODIUM 2 G: 2 INJECTION, SOLUTION INTRAVENOUS at 15:09

## 2018-06-15 RX ADMIN — CEFAZOLIN SODIUM 2 G: 2 INJECTION, SOLUTION INTRAVENOUS at 07:38

## 2018-06-15 RX ADMIN — GABAPENTIN 300 MG: 300 CAPSULE ORAL at 20:32

## 2018-06-15 RX ADMIN — HYDROCODONE BITARTRATE AND ACETAMINOPHEN 1 TABLET: 7.5; 325 TABLET ORAL at 22:59

## 2018-06-15 RX ADMIN — LABETALOL HYDROCHLORIDE 5 MG: 5 INJECTION, SOLUTION INTRAVENOUS at 12:39

## 2018-06-15 RX ADMIN — IPRATROPIUM BROMIDE AND ALBUTEROL SULFATE 3 ML: .5; 3 SOLUTION RESPIRATORY (INHALATION) at 14:55

## 2018-06-15 RX ADMIN — HYDROMORPHONE HYDROCHLORIDE 0.5 MG: 1 INJECTION, SOLUTION INTRAMUSCULAR; INTRAVENOUS; SUBCUTANEOUS at 11:08

## 2018-06-15 RX ADMIN — LIDOCAINE HYDROCHLORIDE 50 MG: 20 INJECTION, SOLUTION INFILTRATION; PERINEURAL at 07:37

## 2018-06-15 RX ADMIN — FENTANYL CITRATE 50 MCG: 50 INJECTION, SOLUTION INTRAMUSCULAR; INTRAVENOUS at 06:55

## 2018-06-15 RX ADMIN — GLYCOPYRROLATE 0.2 MG: 0.2 INJECTION INTRAMUSCULAR; INTRAVENOUS at 07:37

## 2018-06-15 RX ADMIN — ROCURONIUM BROMIDE 5 MG: 10 INJECTION INTRAVENOUS at 07:37

## 2018-06-15 RX ADMIN — FENTANYL CITRATE 50 MCG: 50 INJECTION INTRAMUSCULAR; INTRAVENOUS at 10:08

## 2018-06-15 RX ADMIN — CEFAZOLIN SODIUM 2 G: 2 INJECTION, SOLUTION INTRAVENOUS at 22:58

## 2018-06-15 RX ADMIN — PROPOFOL 180 MG: 10 INJECTION, EMULSION INTRAVENOUS at 07:37

## 2018-06-15 RX ADMIN — HYDROMORPHONE HYDROCHLORIDE 0.5 MG: 1 INJECTION, SOLUTION INTRAMUSCULAR; INTRAVENOUS; SUBCUTANEOUS at 10:28

## 2018-06-15 RX ADMIN — FENTANYL CITRATE 50 MCG: 50 INJECTION INTRAMUSCULAR; INTRAVENOUS at 07:37

## 2018-06-15 RX ADMIN — IPRATROPIUM BROMIDE AND ALBUTEROL SULFATE 3 ML: .5; 3 SOLUTION RESPIRATORY (INHALATION) at 23:13

## 2018-06-15 NOTE — NURSING NOTE
Myself ([facility] Surgery) and Lindsay Berg Director of Surgery notified patient at bedside of a potential exposure from a bronchoscope used in her surgical case this morning.The scope had been disinfected an cleaned but not sterilized.  Informed the patient an exposure panel would be drawn and results and follow up would be given per patient . All concerns and questions answered. Dr. Briseno at  to answer any additional concerns.

## 2018-06-15 NOTE — ANESTHESIA PROCEDURE NOTES
Airway  Urgency: elective    Airway not difficult    General Information and Staff    Patient location during procedure: OR  Anesthesiologist: CRISTIANO VASQUEZ  CRNA: GEORGE SKAGGS    Indications and Patient Condition  Indications for airway management: airway protection    Preoxygenated: yes  MILS maintained throughout  Mask difficulty assessment: 1 - vent by mask    Final Airway Details  Final airway type: endotracheal airway      Successful airway: ETT and ETT - double lumen left  Cuffed: yes   Successful intubation technique: direct laryngoscopy  Facilitating devices/methods: intubating stylet  Endotracheal tube insertion site: oral  Blade: Leticia  Blade size: #3  ETT DL size: 35 fr  Cormack-Lehane Classification: grade I - full view of glottis  Placement verified by: chest auscultation and capnometry   Inital cuff pressure (cm H2O): 22  Cuff volume (mL): 7  Tracheal cuff pressure (cm H2O): 15  Bronchial cuff pressure (cm H2O): 14  Measured from: lips  ETT to lips (cm): 27  Number of attempts at approach: 1

## 2018-06-15 NOTE — H&P
Progress Notes  Encounter Date: 3/20/2018  Darryl Briseno III, MD   Thoracic Surgery   Expand All Collapse All    []Manual[]Template  []Copied     Subjective      Patient ID: Rylee Pennington is a 57 y.o. female is here today for follow-up.     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  Since her initial evaluation she has been evaluated by Dr. Monsivais of cardiology.  The patient has a very large left to right shunt through her left lower lobe.  There is a large 1 cm arterial branch of the aorta supplying the left lower lobe of the lung causing this shunt.  This was too large to be embolized.  Dr. wade his recommended ligation of this shunt.  The patient is here today to discuss surgical intervention.     She gets short of breath quite easily.  Minimal exertion will cause shortness of breath.  She is having no hemoptysis.  She has no pleuritic pain.  There has been no wheezing     The following portions of the patient's history were reviewed and updated as appropriate: allergies, current medications, past family history, past medical history, past social history, past surgical history and problem list.  Review of Systems   Constitution: Negative.   HENT: Negative.    Eyes: Negative.    Cardiovascular: Negative.    Respiratory: Negative.    Endocrine: Negative.    Hematologic/Lymphatic: Negative.    Skin: Negative.    Musculoskeletal: Negative.    Gastrointestinal: Negative.    Genitourinary: Negative.    Neurological: Negative.    Psychiatric/Behavioral: Negative.           Patient Active Problem List   Diagnosis   • Arteriovenous malformation (AVM)   • BASILIO (dyspnea on exertion)      Medical History        Past Medical History:   Diagnosis Date   • AVM (arteriovenous malformation)     • Sleep apnea           Surgical History         Past Surgical History:   Procedure Laterality Date   • APPENDECTOMY       •  CARDIAC CATHETERIZATION N/A 3/9/2018     Procedure: Right Heart Cath;  Surgeon: Vladislav Monsivais MD;  Location:  [facility]ATH [facility]VASIVE [facility];  Service: Cardiovascular   • CARDIAC CATHETERIZATION N/A 3/9/2018     Procedure: Left Heart Cath;  Surgeon: Vladislav Monsivais MD;  Location[facility];  Service: Cardiovascular   • CARDIAC CATHETERIZATION N/A 3/9/2018     Procedure: Coronary angiography;  Surgeon: Vladislav Monsivais MD;  Location: [facility] CATH INVASIVE LOCATION;  Service: Cardiovascular   • CARDIAC CATHETERIZATION N/A 3/9/2018     Procedure: Left ventriculography;  Surgeon: Vladislav Monsivais MD;  Location:  [facility] CATH INVASIVE LOCATION;  Service: Cardiovascular   • CYSTOSCOPY LITHOLAPAXY BLADDER STONE EXTRACTION       • HYSTERECTOMY       • INTERVENTIONAL RADIOLOGY PROCEDURE Bilateral 3/9/2018     Procedure: Pulmonary Angiogram;  Surgeon: Vladislav Monsivais MD;  Location: [facility] CATH INVASIVE LOC[facility];  Service: Cardiovascular   • LAPAROSCOPIC GASTRIC BANDING                   Family History   Problem Relation Age of Onset   • Melanoma Mother     • Breast cancer Mother     • Lung cancer Father     • Diabetes Father     • Heart disease Father     • Hypertension Sister     • Heart attack Son     • Heart block Son        Social History   Social History            Social History   • Marital status:        Spouse name: N/A   • Number of children: N/A   • Years of education: N/A          Occupational History   • Not on file.      Social History Main Topics   • Smoking status: Never Smoker   • Smokeless tobacco: Never Used   • Alcohol use No         Comment: daily caffiene, 1 energy drink    • Drug use: No   • Sexual activity: Defer           Other Topics Concern   • Not on file          Social History Narrative   • No narrative on file            Current Outpatient Prescriptions:   •  phentermine 30 MG capsule, Take 37.5 mg by mouth Every Morning., Disp: , Rfl:   No Known Allergies        Objective          Vitals:      03/20/18 1402   BP: 135/71   Pulse: 87   SpO2: 96%      Physical Exam   Constitutional: She is oriented to person, place, and time. She appears well-developed and well-nourished.   HENT:   Head: Normocephalic.   Eyes: Conjunctivae, EOM and lids are normal. Pupils are equal, round, and reactive to light.   Neck: Trachea normal and normal range of motion. Neck supple. No hepatojugular reflux and no JVD present. Carotid bruit is not present. No thyroid mass and no thyromegaly present.   Cardiovascular: Normal rate, regular rhythm, S1 normal, S2 normal, normal heart sounds and normal pulses.   No extrasystoles are present. PMI is not displaced.    Pulmonary/Chest: Effort normal and breath sounds normal.   There is a very loud bruit over the entire left lung posteriorly.   Abdominal: Soft. Normal appearance and bowel sounds are normal. She exhibits no mass. There is no hepatosplenomegaly. There is no tenderness. No hernia.   Musculoskeletal: Normal range of motion.   Neurological: She is alert and oriented to person, place, and time. She has normal strength and normal reflexes. No cranial nerve deficit or sensory deficit. She displays a negative Romberg sign.   Skin: Skin is warm, dry and intact.   Psychiatric: She has a normal mood and affect. Her speech is normal and behavior is normal. Judgment and thought content normal. Cognition and memory are normal.         Assessment/Plan            This lady has a very large AV malformation in the left lower lobe of the lung with a very large left to right shunt.  I agree with Dr. Monsivais that this needs to be addressed surgically.  Should the shunt become larger the patient could definitely developed congestive heart failure.  There could be rupture of this AV malformation and  massive hemorrhage.  I have recommended to the patient and her son that she undergo robot assisted ligation of the aberrant artery supplying this AV malformation.  The patient may ultimately require left  lower lobectomy as well.  I've explained all this in detail to the patient.  We have discussed the procedure as well as the risks and benefits.  I have answered all of her questions.  She has requested that we proceed.  Arrangements are being made for her to undergo surgery at [facility] in the near future.  I will keep you informed of her progress.  Thank you for allowing me to participate in the care Mrs. Pennington.     Diagnoses and all orders for this visit:     Arteriovenous malformation (AVM)  -     Case request                            Office Visit on 3/20/2018            Revision History            Detailed Report          Update: Christina 15, 2018    I have seen and examined the patient.  There have been no significant changes since initial evaluation.  This patient has a large AV formation in the left lower lobe of the lung causing a large left-to-right shunt.  She is here today for ligation of this AV fistula.  I've explained the procedure to her in detail.  We have discussed the risks and benefits.  I have answered all of her questions to her satisfaction.  She has requested that we proceed.

## 2018-06-15 NOTE — ANESTHESIA PROCEDURE NOTES
Arterial Line    Patient location during procedure: pre-op  Start time: 6/15/2018 6:52 AM  Stop Time:6/15/2018 7:00 AM       Line placed for hemodynamic monitoring.  Performed By   Anesthesiologist: CRISTIANO VASQUEZ  Preanesthetic Checklist  Completed: patient identified, surgical consent, pre-op evaluation, timeout performed, IV checked, risks and benefits discussed and monitors and equipment checked  Arterial Line Prep   Sterile Tech: gloves and cap  Prep: ChloraPrep  Patient monitoring: blood pressure monitoring, continuous pulse oximetry and EKG  Arterial Line Procedure   Laterality:right  Location:  radial artery  Catheter size: 20 G   Guidance: landmark technique  Number of attempts: 2  Successful placement: yes          Post Assessment   Dressing Type: occlusive dressing applied and wrist guard applied.   Complications no  Circ/Move/Sens Assessment: normal and unchanged.   Patient Tolerance: patient tolerated the procedure well with no apparent complications

## 2018-06-15 NOTE — PLAN OF CARE
Problem: Patient Care Overview  Goal: Plan of Care Review  Outcome: Ongoing (interventions implemented as appropriate)   06/15/18 6448   Coping/Psychosocial   Plan of Care Reviewed With patient   Plan of Care Review   Progress no change   OTHER   Outcome Summary Nebulizer treatments, O2 PRN, Flutter and IS, Deep breathe and cough       Problem: Lung Surgery (via Thoracotomy) (Adult)  Goal: Signs and Symptoms of Listed Potential Problems Will be Absent, Minimized or Managed (Lung Surgery)  Outcome: Ongoing (interventions implemented as appropriate)

## 2018-06-15 NOTE — PLAN OF CARE
Problem: Patient Care Overview  Goal: Plan of Care Review  Outcome: Ongoing (interventions implemented as appropriate)   06/15/18 8086   Coping/Psychosocial   Plan of Care Reviewed With patient;sibling   Plan of Care Review   Progress no change   OTHER   Outcome Summary arrived this afternoon from PACU. VSS. NSR on monitor. Using flutter and IS.. Urinary catheter in place. Drsg to left chest CDI. CT to -20cm suction. No air leak and no subcutaneous emphysema. Sister at bedside       Problem: Fall Risk (Adult)  Goal: Identify Related Risk Factors and Signs and Symptoms  Outcome: Ongoing (interventions implemented as appropriate)      Problem: Lung Surgery (via Thoracotomy) (Adult)  Goal: Signs and Symptoms of Listed Potential Problems Will be Absent, Minimized or Managed (Lung Surgery)  Outcome: Ongoing (interventions implemented as appropriate)

## 2018-06-15 NOTE — ANESTHESIA PREPROCEDURE EVALUATION
Anesthesia Evaluation     Patient summary reviewed and Nursing notes reviewed   no history of anesthetic complications:  NPO Solid Status: > 8 hours  NPO Liquid Status: > 8 hours           Airway   Mallampati: I  Dental - normal exam     Pulmonary - normal exam   (+) shortness of breath, sleep apnea,   Cardiovascular - normal exam    (+) BASILIO,       Neuro/Psych  (+) psychiatric history Anxiety,     GI/Hepatic/Renal/Endo - negative ROS     Musculoskeletal     Abdominal    Substance History      OB/GYN          Other                      Anesthesia Plan    ASA 2     general     intravenous induction   Anesthetic plan and risks discussed with patient.

## 2018-06-15 NOTE — PROGRESS NOTES
I just received a phone call from Karen Sultana  and Lindsay Berg.  They are the nurse managers in the operating room.  They have informed me that the bronchoscope used during this morning's procedure had been cleaned but not sterilized.  Patient will have to undergo HIV and hepatitis testing.  I have informed the patient of the situation.  We will monitor her closely.

## 2018-06-15 NOTE — OP NOTE
THORACOSCOPY WITH DAVINCI ROBOT  Progress Note    Rylee Pennington  6/15/2018    Pre-op Diagnosis:   Arteriovenous malformation (AVM) [Q27.30]       Post-Op Diagnosis Codes:     * Arteriovenous malformation (AVM) [Q27.30]    Procedure/CPT® Codes:      Procedure(s):  BRONCHOSCOPY, LEFT VIDEO ASSISTED THORACOSCOPY WITH DAVINCI ROBOT WITH LIGATION AV MALFORMATION INTERCOSTAL NERVE BLOCK WITH CRYOPROBE    Surgeon(s):  Darryl Briseno III, MD    Anesthesia: General    Staff:   Circulator: Tanya Jaffe RN  Scrub Person: Colt Son; David Mo  Assistant: EDWINA Klein  Orientee: Noa Abarca RN    Estimated Blood Loss: 10 mL    Urine Voided: * No values recorded between 6/15/2018  7:28 AM and 6/15/2018  9:40 AM *    Specimens:                None      Drains:   Chest Tube 1 Left Pleural (Active)       Urethral Catheter Double-lumen;Non-latex 16 Fr. (Active)       Findings: Large branch off the descending thoracic aorta just below the level of the marie that communicates with pulmonary artery.  This branch was mobilized between the aorta and a small side branch.  Hemo-lock clips were placed across the artery near its takeoff to the aorta proximal to the side branch and distal to the side branch.    Complications: none    Summary of procedure: Ms. Pennington was brought to the operating room and placed on the operating table in the supine position.  Following the induction of adequate general endotracheal anesthesia, the flexible bronchoscope was passed down the endotracheal tube.  Distal trachea and marie appeared to be normal.  Marie was sharp and nondisplaced.  Right mainstem bronchus, right upper lobe, right middle lobe, and right lower lobe bronchi showed no endobronchial lesions or mucosal abnormalities.  The scope was then passed down the left main bronchus.  Left upper lobe and left lower lobe bronchi showed no endobronchial lesions or mucosal abnormalities.  Scope was then used to position the  double-lumen tube in the left main bronchus.  Once the tube was in good position, the patient was turned into the right lateral decubitus position.  All pressure points were padded.  A roll was placed in the right axilla.  The patient was secured to the operating table with 3 inch adhesive tape and Velcro safety strap.  The left chest was then prepped and draped in usual sterile manner.    The left lung was deflated.  A port site was created in the seventh intercostal space mid axillary line.  Scope was introduced into the pleural space.  Lower lobe was markedly injected with dilated vessels over its surface.  Upper lobe was similar but not as pronounced.  Complete fissure between the upper lobe and the lower lobe.  3 other port sites were created.  The robot was brought into the field and docked to the ports.  Instruments were passed into the pleural space under direct vision.  At this point I broke scrub and went to the robot console.  My assistant stayed at the bedside to pass and manipulate instruments.    The lower lobe was retracted medially.  The thoracic descending aorta was exposed.  A large branch was easily found coming off the anterior aspect of the thoracic aorta just below the level of the rafy.  This branch was mobilized.  There was a large side branch just past the takeoff.  This branch was also mobilized.  Hemo-lock was then placed across the main branch near its takeoff to the aorta and proximal to the smaller side branch.  2 more hemo-locks were placed on the distal vessel beyond the takeoff of the small branch.  Hemostasis was obtained.  The injection in the surface vessels of the left lower lobe appeared less.  Intercostal nerve blocks were now performed with the cryoprobe.  A #28 Vietnamese chest tube was placed in the pleural space and brought out through the working port and secured to the chest wall with sutures 0 silk.  All port sites were closed in layers with 2-0 Vicryl and the skin with  4-0 Vicryl.  Dry sterile dressings were applied.    The patient was awakened in the operating room and extubated and transported to the recovery room in satisfactory condition having tolerated the procedure well.  Sponge instrument and needle counts were correct times 2 at the end of the procedure    .        Dictated utilizing Dragon dictation  Darryl Briseno III, MD     Date: 6/15/2018  Time: 9:43 AM

## 2018-06-15 NOTE — ANESTHESIA POSTPROCEDURE EVALUATION
Patient: Rylee Pennington    Procedure Summary     Date:  06/15/18 Room / Location:  [facility] OR  / [facility] MAIN OR    Anesthesia Start:  0737 Anesthesia Stop:  0941    Procedure:  BRONCHOSCOPY, LEFT VIDEO ASSISTED THORACOSCOPY WITH DAVINCI ROBOT WITH LIGATION AV MALFORMATION INTERCOSTAL NERVE BLOCK WITH CRYOPROBE (Left Chest) Diagnosis:       Arteriovenous malformation (AVM)      (Arteriovenous malformation (AVM) [Q27.30])    Surgeon:  Darryl Briseno III, MD Provider:  Naif Vila MD    Anesthesia Type:  general ASA Status:  2          Anesthesia Type: general  Last vitals  BP   167/92 (06/15/18 1133)   Temp   36.4 °C (97.6 °F) (06/15/18 0604)   Pulse   69 (06/15/18 1133)   Resp   20 (06/15/18 1100)     SpO2   98 % (06/15/18 1133)     Post Anesthesia Care and Evaluation    Patient location during evaluation: PACU  Patient participation: complete - patient participated  Level of consciousness: awake  Pain score: 0  Pain management: adequate  Airway patency: patent  Anesthetic complications: No anesthetic complications  PONV Status: none  Cardiovascular status: acceptable  Respiratory status: acceptable  Hydration status: acceptable    Comments: /92   Pulse 69   Temp 36.4 °C (97.6 °F) (Oral)   Resp 20   SpO2 98%

## 2018-06-16 ENCOUNTER — APPOINTMENT (OUTPATIENT)
Dept: GENERAL RADIOLOGY | Facility: HOSPITAL | Age: 58
End: 2018-06-16

## 2018-06-16 VITALS
DIASTOLIC BLOOD PRESSURE: 76 MMHG | BODY MASS INDEX: 33.31 KG/M2 | HEIGHT: 64 IN | OXYGEN SATURATION: 96 % | WEIGHT: 195.11 LBS | RESPIRATION RATE: 16 BRPM | TEMPERATURE: 98.2 F | HEART RATE: 79 BPM | SYSTOLIC BLOOD PRESSURE: 142 MMHG

## 2018-06-16 LAB
ANION GAP SERPL CALCULATED.3IONS-SCNC: 10.9 MMOL/L
BUN BLD-MCNC: 7 MG/DL (ref 6–20)
BUN/CREAT SERPL: 13.7 (ref 7–25)
CALCIUM SPEC-SCNC: 9.1 MG/DL (ref 8.6–10.5)
CHLORIDE SERPL-SCNC: 107 MMOL/L (ref 98–107)
CO2 SERPL-SCNC: 25.1 MMOL/L (ref 22–29)
CREAT BLD-MCNC: 0.51 MG/DL (ref 0.57–1)
DEPRECATED RDW RBC AUTO: 48.8 FL (ref 37–54)
ERYTHROCYTE [DISTWIDTH] IN BLOOD BY AUTOMATED COUNT: 15.3 % (ref 11.7–13)
GFR SERPL CREATININE-BSD FRML MDRD: 123 ML/MIN/1.73
GLUCOSE BLD-MCNC: 105 MG/DL (ref 65–99)
HCT VFR BLD AUTO: 39.4 % (ref 35.6–45.5)
HGB BLD-MCNC: 12 G/DL (ref 11.9–15.5)
MCH RBC QN AUTO: 26.8 PG (ref 26.9–32)
MCHC RBC AUTO-ENTMCNC: 30.5 G/DL (ref 32.4–36.3)
MCV RBC AUTO: 87.9 FL (ref 80.5–98.2)
PLATELET # BLD AUTO: 185 10*3/MM3 (ref 140–500)
PMV BLD AUTO: 11 FL (ref 6–12)
POTASSIUM BLD-SCNC: 4.1 MMOL/L (ref 3.5–5.2)
RBC # BLD AUTO: 4.48 10*6/MM3 (ref 3.9–5.2)
SODIUM BLD-SCNC: 143 MMOL/L (ref 136–145)
WBC NRBC COR # BLD: 10.58 10*3/MM3 (ref 4.5–10.7)

## 2018-06-16 PROCEDURE — 25010000002 HEPARIN (PORCINE) PER 1000 UNITS: Performed by: THORACIC SURGERY (CARDIOTHORACIC VASCULAR SURGERY)

## 2018-06-16 PROCEDURE — 94799 UNLISTED PULMONARY SVC/PX: CPT

## 2018-06-16 PROCEDURE — 85027 COMPLETE CBC AUTOMATED: CPT | Performed by: THORACIC SURGERY (CARDIOTHORACIC VASCULAR SURGERY)

## 2018-06-16 PROCEDURE — 80048 BASIC METABOLIC PNL TOTAL CA: CPT | Performed by: THORACIC SURGERY (CARDIOTHORACIC VASCULAR SURGERY)

## 2018-06-16 PROCEDURE — 97161 PT EVAL LOW COMPLEX 20 MIN: CPT | Performed by: PHYSICAL THERAPIST

## 2018-06-16 PROCEDURE — 25010000002 KETOROLAC TROMETHAMINE PER 15 MG: Performed by: NURSE PRACTITIONER

## 2018-06-16 PROCEDURE — 99024 POSTOP FOLLOW-UP VISIT: CPT | Performed by: THORACIC SURGERY (CARDIOTHORACIC VASCULAR SURGERY)

## 2018-06-16 PROCEDURE — 71045 X-RAY EXAM CHEST 1 VIEW: CPT

## 2018-06-16 RX ADMIN — SODIUM CHLORIDE 75 ML/HR: 9 INJECTION, SOLUTION INTRAVENOUS at 06:04

## 2018-06-16 RX ADMIN — HYDROCODONE BITARTRATE AND ACETAMINOPHEN 2 TABLET: 7.5; 325 TABLET ORAL at 13:52

## 2018-06-16 RX ADMIN — HEPARIN SODIUM 5000 UNITS: 5000 INJECTION, SOLUTION INTRAVENOUS; SUBCUTANEOUS at 09:13

## 2018-06-16 RX ADMIN — IPRATROPIUM BROMIDE AND ALBUTEROL SULFATE 3 ML: .5; 3 SOLUTION RESPIRATORY (INHALATION) at 08:14

## 2018-06-16 RX ADMIN — DOCUSATE SODIUM 100 MG: 100 CAPSULE, LIQUID FILLED ORAL at 09:14

## 2018-06-16 RX ADMIN — POLYETHYLENE GLYCOL 3350 17 G: 17 POWDER, FOR SOLUTION ORAL at 09:13

## 2018-06-16 RX ADMIN — CELECOXIB 200 MG: 200 CAPSULE ORAL at 09:13

## 2018-06-16 RX ADMIN — HYDROCODONE BITARTRATE AND ACETAMINOPHEN 1 TABLET: 7.5; 325 TABLET ORAL at 06:01

## 2018-06-16 RX ADMIN — GABAPENTIN 300 MG: 300 CAPSULE ORAL at 09:14

## 2018-06-16 RX ADMIN — KETOROLAC TROMETHAMINE 30 MG: 30 INJECTION, SOLUTION INTRAMUSCULAR; INTRAVENOUS at 08:10

## 2018-06-16 NOTE — THERAPY EVALUATION
Acute Care - Physical Therapy Initial Evaluation  [facility]     Patient Name: Rylee Pennington  : 1960  MRN: [id number]  Today's Date: 2018                Admit Date: 6/15/2018    Visit Dx:     ICD-10-CM ICD-9-CM   1. Arteriovenous malformation (AVM) Q27.30 747.60   2. AV malformation, acquired I77.89 447.8     Patient Active Problem List   Diagnosis   • Arteriovenous malformation (AVM)   • BASILIO (dyspnea on exertion)   • Arterio-venous malformation     Past Medical History:   Diagnosis Date   • Anxiety     TOOK MEDS IN THE PAST-NONE LAST TWO YRS   • AVM (arteriovenous malformation)    • History of kidney stones    • Shortness of breath on exertion    • Sleep apnea     NO C-PAP IN USE     Past Surgical History:   Procedure Laterality Date   • APPENDECTOMY     • CARDIAC CATHETERIZATION N/A 3/9/2018    Procedure: Right Heart Cath;  Surgeon: Vladislav Monsivais MD;  Location: [facility] INVASIVE LOCATION;  Service: Cardiovascular   • CARDIAC CATHETERIZATION N/A 3/9/2018    Procedure: Left Heart Cath;  Surgeon: Vladislav Monsivais MD;  Location: [facility] INVASIVE LOCATION;  Service: Cardiovascular   • CARDIAC CATHETERIZATION N/A 3/9/2018    Procedure: Coronary angiography;  Surgeon: Vladislav Monsivais MD;  Location: [facility] INVASIVE LOCATION;  Service: Cardiovascular   • CARDIAC CATHETERIZATION N/A 3/9/2018    Procedure: Left ventriculography;  Surgeon: Vladislav Monsivais MD;  Location: [facility] INVASIVE LOCATION;  Service: Cardiovascular   • CYSTOSCOPY LITHOLAPAXY BLADDER STONE EXTRACTION     • FRACTURE SURGERY Right 1995    right leg   • GASTRIC BANDING REMOVAL  2006   • HYSTERECTOMY  1984   • INTERVENTIONAL RADIOLOGY PROCEDURE Bilateral 3/9/2018    Procedure: Pulmonary Angiogram;  Surgeon: Vladislav Monsivais MD;  Location: [facility] INVASIVE LOCATION;  Service: Cardiovascular   • LAPAROSCOPIC GASTRIC BANDING  2004        PT ASSESSMENT (last 12 hours)      Physical Therapy Evaluation     Row Name 18 1300          PT  Evaluation Time/Intention    Subjective Information no complaints  -GR     Document Type evaluation  -GR     Mode of Treatment physical therapy  -GR     Patient Effort excellent  -GR     Row Name 06/16/18 1300          General Information    Prior Level of Function independent:;all household mobility;community mobility  -GR     Row Name 06/16/18 1300          Relationship/Environment    Lives With spouse  -     Row Name 06/16/18 1300          Resource/Environmental Concerns    Resource/Environmental Concerns none  -     Row Name 06/16/18 1300          Cognitive Assessment/Intervention- PT/OT    Orientation Status (Cognition) oriented x 4  -GR     Row Name 06/16/18 1300          Bed Mobility Assessment/Treatment    Comment (Bed Mobility) deferred - up in chair  -     Row Name 06/16/18 1300          Transfer Assessment/Treatment    Comment (Transfers) independent all transfers  -     Row Name 06/16/18 1300          Gait/Stairs Assessment/Training    [place] Level (Gait) supervision  -GR     Distance in Feet (Gait) 120  -GR     Comment (Gait/Stairs) supervisoin due to unfamiliar environment - otherwise independent  -     Row Name 06/16/18 1300          General ROM    GENERAL ROM COMMENTS WFL  -     Row Name 06/16/18 1300          General Assessment (Manual Muscle Testing)    Comment, General Manual Muscle Testing (MMT) Assessment WFL  -     Row Name 06/16/18 1300          Pain Assessment    Additional Documentation Pain Scale: Numbers Pre/Post-Treatment (Group)  -     Row Name 06/16/18 1300          Pain Scale: Numbers Pre/Post-Treatment    Pain Scale: Numbers, Pretreatment 0/10 - no pain  -     Pain Scale: Numbers, Post-Treatment 0/10 - no pain  -     Row Name             [REMOVED] Wound 06/15/18 0847 Left abdomen incision    Wound - Properties Group Date first assessed: 06/15/18  -TL Time first assessed: 0847  -TL Side: Left  -TL Location: abdomen  -TL Type: incision  -TL Resolution Date:  06/16/18  -KS Resolution Time: 0800  -KS Wound Outcome: Other  -KS, does not have an abd incision     Row Name 06/16/18 1300          Positioning and Restraints    Pre-Treatment Position sitting in chair/recliner  -GR     Post Treatment Position chair  -GR     In Chair sitting;call light within reach;encouraged to call for assist  -GR     Row Name 06/16/18 1300          Physical Therapy Discharge Summary    Additional Documentation Discharge Summary, PT Eval (Group)  -GR     Row Name 06/16/18 1300          Discharge Summary, PT Eval    Reason for Discharge (PT Discharge Summary) no further needs identified  -GR     Outcomes Achieved Upon Discharge (PT Discharge Summary) evaluation only  -GR     Row Name 06/16/18 1300          Living Environment    Home Accessibility stairs to enter home   1 step to enter  -GR       User Key  (r) = Recorded By, (t) = Taken By, (c) = Cosigned By    Initials Name Provider Type    GR Kwan Henderson, PT Physical Therapist    SEJAL Jaffe, RN Registered Nurse    ANTHONY Lopez, RN Registered Nurse              PT Recommendation and Plan  Therapy Frequency (PT Clinical Impression): evaluation only  Outcome Summary/Treatment Plan (PT)  Reason for Discharge (PT Discharge Summary): no further needs identified  Plan of Care Reviewed With: patient  Outcome Summary: 57 y/o F referred for PT evaluatoin. She presents at independent functional baseline and does not require skilled PT at this time. Advised on seated program for home. Recommend d/c home with famliy as medically appropriate.          Outcome Measures     Row Name 06/16/18 1300             How much help from another person do you currently need...    Turning from your back to your side while in flat bed without using bedrails? 4  -GR      Moving from lying on back to sitting on the side of a flat bed without bedrails? 4  -GR      Moving to and from a bed to a chair (including a wheelchair)? 4  -GR      Standing up from a chair  using your arms (e.g., wheelchair, bedside chair)? 4  -GR      Climbing 3-5 steps with a railing? 4  -GR      To walk in hospital room? 4  -GR      AM-PAC 6 Clicks Score 24  -GR         Functional Assessment    Outcome Measure Options AM-PAC 6 Clicks Basic Mobility (PT)  -GR        User Key  (r) = Recorded By, (t) = Taken By, (c) = Cosigned By    Initials Name Provider Type    MENDY Henderson PT Physical Therapist           Time Calculation:         PT Charges     Row Name 06/16/18 1359             Time Calculation    Start Time 1320  -GR      Stop Time 1328  -GR      Time Calculation (min) 8 min  -GR         Time Calculation- PT    Total Timed Code Minutes- PT 0 minute(s)  -GR        User Key  (r) = Recorded By, (t) = Taken By, (c) = Cosigned By    Initials Name Provider Type    MENDY Henderson PT Physical Therapist        Therapy Suggested Charges     Code   Minutes Charges    None           Therapy Charges for Today     Code Description Service Date Service Provider Modifiers Qty    [id number] HC PT EVAL LOW COMPLEXITY 1 6/16/2018 Kwan Henderson, PT GP 1          PT G-Codes  Outcome Measure Options: AM-PAC 6 Clicks Basic Mobility (PT)      Kwan Henderson PT  6/16/2018

## 2018-06-16 NOTE — PROGRESS NOTES
Postoperative day 1 roboticVATS ligation of arterial venous fistula  Minimal chest tube output.  Chest tube removed at bedside.  Case discussed with Dr. Zuly Gonzales for discharge.

## 2018-06-16 NOTE — PLAN OF CARE
Problem: Patient Care Overview  Goal: Plan of Care Review  Outcome: Outcome(s) achieved Date Met: 06/16/18 06/16/18 6594   Coping/Psychosocial   Plan of Care Reviewed With patient   OTHER   Outcome Summary 59 y/o F referred for PT evaluatoin. She presents at independent functional baseline and does not require skilled PT at this time. Advised on seated program for home. Recommend d/c home with famliy as medically appropriate.

## 2018-06-16 NOTE — DISCHARGE INSTR - LAB
Someone from [facility] will contact you regarding your lab results and follow up lab tests which will need to be drawn.

## 2018-06-16 NOTE — PLAN OF CARE
Problem: Patient Care Overview  Goal: Plan of Care Review  Outcome: Ongoing (interventions implemented as appropriate)   06/16/18 020   Coping/Psychosocial   Plan of Care Reviewed With patient   Plan of Care Review   Progress improving   OTHER   Outcome Summary VSS, A&O x4. Using IS and flutter at bedside. Chest tube to -20cm suction. F/c in place. No air-leak noted. Will continue to monitor closely       Problem: Fall Risk (Adult)  Goal: Identify Related Risk Factors and Signs and Symptoms  Outcome: Ongoing (interventions implemented as appropriate)    Goal: Absence of Fall  Outcome: Ongoing (interventions implemented as appropriate)      Problem: Lung Surgery (via Thoracotomy) (Adult)  Goal: Signs and Symptoms of Listed Potential Problems Will be Absent, Minimized or Managed (Lung Surgery)  Outcome: Ongoing (interventions implemented as appropriate)    Goal: Anesthesia/Sedation Recovery  Outcome: Ongoing (interventions implemented as appropriate)

## 2018-06-18 NOTE — PROGRESS NOTES
Case Management Discharge Note    Final Note: Orders reviewed.  Pt discharged, no known needs.     Destination     No service coordination in this encounter.      Durable Medical Equipment     No service coordination in this encounter.      Dialysis/Infusion     No service coordination in this encounter.      Home Medical Care     No service coordination in this encounter.      Social Care     No service coordination in this encounter.        Other: Other (private auto)    Final Discharge Disposition Code: 01 - home or self-care

## 2018-06-20 DIAGNOSIS — Z09 SURGERY FOLLOW-UP: Primary | ICD-10-CM

## 2018-06-25 ENCOUNTER — OFFICE VISIT (OUTPATIENT)
Dept: SURGERY | Facility: CLINIC | Age: 58
End: 2018-06-25

## 2018-06-25 ENCOUNTER — HOSPITAL ENCOUNTER (OUTPATIENT)
Dept: GENERAL RADIOLOGY | Facility: HOSPITAL | Age: 58
Discharge: HOME | End: 2018-06-25
Attending: THORACIC SURGERY (CARDIOTHORACIC VASCULAR SURGERY) | Admitting: THORACIC SURGERY (CARDIOTHORACIC VASCULAR SURGERY)

## 2018-06-25 VITALS
SYSTOLIC BLOOD PRESSURE: 157 MMHG | OXYGEN SATURATION: 98 % | HEART RATE: 82 BPM | HEIGHT: 64 IN | DIASTOLIC BLOOD PRESSURE: 74 MMHG | WEIGHT: 195 LBS | BODY MASS INDEX: 33.29 KG/M2

## 2018-06-25 DIAGNOSIS — Z09 SURGERY FOLLOW-UP: ICD-10-CM

## 2018-06-25 DIAGNOSIS — Z09 FOLLOW-UP EXAMINATION FOLLOWING SURGERY: ICD-10-CM

## 2018-06-25 DIAGNOSIS — Q27.30 ARTERIOVENOUS MALFORMATION (AVM): Primary | ICD-10-CM

## 2018-06-25 PROCEDURE — 99024 POSTOP FOLLOW-UP VISIT: CPT | Performed by: THORACIC SURGERY (CARDIOTHORACIC VASCULAR SURGERY)

## 2018-06-25 PROCEDURE — 71046 X-RAY EXAM CHEST 2 VIEWS: CPT

## 2018-06-25 NOTE — DISCHARGE SUMMARY
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 female who presented to the emergency room with low back pain.  During her evaluation CT scan of the chest was performed.  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  This has been asymptomatic.  She has no cough or hemoptysis.  She has no pleuritic pain.  She has no hoarseness or change in her voice.  She is active without significant shortness of breath or dyspnea on exertion.  She has had no unexplained weight loss.    Hospital Course  Patient was evaluated by Dr. Monsivais of cardiology for possible embolization.  He felt that the feeding artery was too large for embolization.  She was referred back to me for surgical ligation.  She was brought to the hospital on [address] 15 and a robot-assisted left thoracoscopy was performed.  An aorta to left pulmonary artery fistula was identified.  This was ligated without difficulty.  She tolerated the procedure well.  She was extubated in the operating room.  She remained hemodynamically stable in recovery room and was transferred to [facility] for convalescence. The night of surgery she had little or no chest wall pain.  She had no shortness of breath or hemoptysis.  She had minimal chest tube drainage.  On the first postoperative day her pain was well controlled.  She had no shortness of breath.  Chest x-ray was stable.  Chest tube was removed and the patient was discharged home.  Prescriptions and instructions were given.  Patient was to return to my office in 2 weeks with  chest x-ray     Procedures Performed  Procedure(s):  BRONCHOSCOPY, LEFT VIDEO ASSISTED THORACOSCOPY WITH DAVINCI ROBOT WITH LIGATION AV MALFORMATION INTERCOSTAL NERVE BLOCK WITH CRYOPROBE       Consults:   Consults     No orders found from 5/17/2018 to 6/16/2018.          Pertinent Test Results:     Imaging Results (last 24 hours)     Procedure Component Value Units Date/Time    XR Chest 1 View [[id number]] Collected:  06/16/18 0723     Updated:  06/16/18 0754    Narrative:       ONE VIEW PORTABLE CHEST AT 5:45 AM     HISTORY: Recent left-sided chest surgery. Chest tube.     FINDINGS: The left-sided chest tube is in place and there is a very  small left apical pneumothorax measuring 5 mm in height showing no  significant change from yesterday's exam. There is improvement in the  bibasilar atelectasis. The heart remains mildly enlarged.     This report was finalized on 6/16/2018 7:51 AM by Dr. Greyson Forman M.D.             Lab Results (last 24 hours)     Procedure Component Value Units Date/Time    Basic Metabolic Panel [[id number]]  (Abnormal) Collected:  06/16/18 0413    Specimen:  Blood Updated:  06/16/18 0503     Glucose 105 (H) mg/dL      BUN 7 mg/dL      Creatinine 0.51 (L) mg/dL      Sodium 143 mmol/L      Potassium 4.1 mmol/L      Chloride 107 mmol/L      CO2 25.1 mmol/L      Calcium 9.1 mg/dL      eGFR Non African Amer 123 mL/min/1.73      BUN/Creatinine Ratio 13.7     Anion Gap 10.9 mmol/L     Narrative:       GFR Normal >60  Chronic Kidney Disease <60  Kidney Failure <15    CBC (No Diff) [[id number]]  (Abnormal) Collected:  06/16/18 0413    Specimen:  Blood Updated:  06/16/18 0441     WBC 10.58 10*3/mm3      RBC 4.48 10*6/mm3      Hemoglobin 12.0 g/dL      Hematocrit 39.4 %      MCV 87.9 fL      MCH 26.8 (L) pg      MCHC 30.5 (L) g/dL      RDW 15.3 (H) %      RDW-SD 48.8 fl      MPV 11.0 fL      Platelets 185 10*3/mm3     HIV-1 / O / 2 Ag / Antibody 4th Generation [[id number]]  (Normal) Collected:  06/15/18  1930    Specimen:  Blood Updated:  06/15/18 2030     HIV-1/ HIV-2 Non-Reactive     HIV-1 P24 Ag Screen Non-Reactive    Hepatitis B Surface Antibody [[id number]]  (Normal) Collected:  06/15/18 1930    Specimen:  Blood Updated:  06/15/18 2018     Hep B S Ab Non-Reactive    Hepatitis B Surface Antigen [[id number]]  (Normal) Collected:  06/15/18 1930    Specimen:  Blood Updated:  06/15/18 2018     Hepatitis B Surface Ag Non-Reactive    Hepatitis C Antibody [[id number]]  (Normal) Collected:  06/15/18 1930    Specimen:  Blood Updated:  06/15/18 2018     Hepatitis C Ab Non-Reactive            Condition on Discharge:  Improved    Vital Signs  Heart Rate:  [82] 82  BP: (157)/(74) 157/74    Physical Exam:  Chest: All incisions are clean and dry.  Lungs are clear with equal breath sounds bilaterally.  Bruit over the posterior left chest wall is diminished.    Cardiac: Regular rate and rhythm.  No murmurs or gallops.  No dependent edema.    Discharge Disposition  Home today    Discharge Medications     Discharge Medications      Continue These Medications      Instructions Start Date   phentermine 30 MG capsule   37.5 mg, Oral, Every Morning, LAST DOSE 4/7/18             Discharge Instructions:  · No heavy lifting, pushing, pulling greater than 10 pounds.  · No driving up until 2 weeks after surgery and no longer taking narcotics.  · Resume home diet as tolerated.  · Continue incentive spirometer at least 4 times per day.  · Remove dressing from post chest tube site after 48 hours, may shower and clean surgical sites with antibacterial soap or hydrogen peroxide, and apply gauze dressing or band-aid as needed for any drainage.  No dressing needed once no longer draining.          Follow-up Appointments  No future appointments.  Additional Instructions for the Follow-ups that You Need to Schedule     Someone from [facility] [place] will contact you regarding your lab results and follow up lab tests which will need to be drawn.                  Test Results Pending at Discharge      For any questions regarding patient's stay, please refer to patient's chart.    Darryl Briseno III, MD  [facility]  06/25/18  4:34 PM

## 2018-06-25 NOTE — PROGRESS NOTES
Subjective   Patient ID: Rylee Pennington is a 58 y.o. female is here today for follow-up.    History of Present Illness  Dear Colleagues,   Rylee Pennington is here today for their first postoperative visit following a robotic-assisted ligation of aorta to left pulmonary artery fistula performed Christina 15, 2018.  Patient was discharged home on the first postoperative day.  She has had little or no chest wall pain.  She states that she was climbing 2-3 flights stairs multiple times yesterday with no significant shortness of breath.  She has had no hemoptysis.  Appetite is back to normal.    There was a problem with the sterilization of the bronchoscope used during the procedure.  She had HIV and hepatitis testing before leaving the hospital.  These tests were negative.  She is scheduled to have these tests repeated in 4 months.  This is all pending discussed with the patient in detail several times.    The following portions of the patient's history were reviewed and updated as appropriate: allergies, current medications, past family history, past medical history, past social history, past surgical history and problem list.  Review of Systems   Constitution: Negative.   HENT: Negative.    Eyes: Negative.    Cardiovascular: Negative.    Respiratory: Negative.    Endocrine: Negative.    Hematologic/Lymphatic: Negative.    Skin: Negative.    Musculoskeletal: Negative.    Gastrointestinal: Negative.    Genitourinary: Negative.    Neurological: Negative.    Psychiatric/Behavioral: Negative.         Objective   Physical Exam   Constitutional: She is oriented to person, place, and time. She appears well-developed and well-nourished.   HENT:   Head: Normocephalic.   Eyes: Conjunctivae, EOM and lids are normal. Pupils are equal, round, and reactive to light.   Neck: Trachea normal and normal range of motion. Neck supple. No hepatojugular reflux and no JVD present. Carotid bruit is not present. No thyroid mass and no thyromegaly  present.   Cardiovascular: Normal rate, regular rhythm, S1 normal, S2 normal, normal heart sounds and normal pulses.   No extrasystoles are present. PMI is not displaced.    No murmurs    Pulmonary/Chest: Effort normal and breath sounds normal.   All incisions are clean and well healed.  No signs of infection.  No subcutaneous masses or nodules.  No bruits over the left posterior chest.   Abdominal: Soft. Normal appearance and bowel sounds are normal. She exhibits no mass. There is no hepatosplenomegaly. There is no tenderness. No hernia.   Musculoskeletal: Normal range of motion.   Neurological: She is alert and oriented to person, place, and time. She has normal strength and normal reflexes. No cranial nerve deficit or sensory deficit. She displays a negative Romberg sign.   Skin: Skin is warm, dry and intact.   Psychiatric: She has a normal mood and affect. Her speech is normal and behavior is normal. Judgment and thought content normal. Cognition and memory are normal.       Assessment/Plan   Independent Review of Radiographic Studies:    Chest x-ray performed today was independently reviewed.  Both lungs are fully expanded.  No pneumothorax.  No pleural effusion.  No infiltrates.    Assessment:Satisfactory postoperative course.  Resolution of preoperative shortness of breath.    Plan:Patient to have repeat HIV and hepatitis panel in 4 months.  Will repeat CT angiogram of the chest at the same time.  She will return here in 4 months for further evaluation and to discuss the test results.  She can resume all normal activities without restriction.    Diagnoses and all orders for this visit:    Arteriovenous malformation (AVM)  -     CT Angiogram Chest With Contrast; Future    Follow-up examination following surgery  -     CT Angiogram Chest With Contrast; Future

## 2018-10-02 ENCOUNTER — HOSPITAL ENCOUNTER (OUTPATIENT)
Dept: CT IMAGING | Facility: HOSPITAL | Age: 58
Discharge: HOME | End: 2018-10-02
Attending: THORACIC SURGERY (CARDIOTHORACIC VASCULAR SURGERY) | Admitting: THORACIC SURGERY (CARDIOTHORACIC VASCULAR SURGERY)

## 2018-10-02 DIAGNOSIS — Z09 FOLLOW-UP EXAMINATION FOLLOWING SURGERY: ICD-10-CM

## 2018-10-02 DIAGNOSIS — Q27.30 ARTERIOVENOUS MALFORMATION (AVM): ICD-10-CM

## 2018-10-02 LAB — CREAT BLDA-MCNC: 0.7 MG/DL (ref 0.6–1.3)

## 2018-10-02 PROCEDURE — 0 IOPAMIDOL PER 1 ML: Performed by: THORACIC SURGERY (CARDIOTHORACIC VASCULAR SURGERY)

## 2018-10-02 PROCEDURE — 82565 ASSAY OF CREATININE: CPT

## 2018-10-02 PROCEDURE — 71275 CT ANGIOGRAPHY CHEST: CPT

## 2018-10-02 RX ADMIN — IOPAMIDOL 95 ML: 755 INJECTION, SOLUTION INTRAVENOUS at 10:06

## 2018-10-10 ENCOUNTER — OFFICE VISIT (OUTPATIENT)
Dept: SURGERY | Facility: CLINIC | Age: 58
End: 2018-10-10

## 2018-10-10 VITALS
DIASTOLIC BLOOD PRESSURE: 71 MMHG | BODY MASS INDEX: 33.29 KG/M2 | HEART RATE: 82 BPM | SYSTOLIC BLOOD PRESSURE: 159 MMHG | HEIGHT: 64 IN | WEIGHT: 195 LBS | OXYGEN SATURATION: 97 %

## 2018-10-10 DIAGNOSIS — Q27.30 ARTERIOVENOUS MALFORMATION (AVM): Primary | ICD-10-CM

## 2018-10-10 PROCEDURE — 99213 OFFICE O/P EST LOW 20 MIN: CPT | Performed by: NURSE PRACTITIONER

## 2018-10-10 NOTE — PROGRESS NOTES
Subjective   Patient ID: Rylee Moss is a 58 y.o. female is here today for follow-up.    History of Present Illness  Dear Colleague,  Rylee Moss was seen in our office today for continued follow up and surveillance after undergoing a left video-assisted thoracoscopy with da Isaías robot with ligation of an AV malformation with Dr. Darryl Briseno on 6/15/18.  Mrs. moss has had a stable postoperative course.  She is doing well and is without complaint.  She denies any complaints of fever, chills, cough, hemoptysis, pleuritic chest pain, shortness of air, dyspnea with exertion, night sweats, hoarseness, or unintentional weight loss. Underlying medical conditions remain stable.  She has no other somatic complaints or alleviating or exacerbating factors aside from those mentioned above.    The following portions of the patient's history were reviewed and updated as appropriate: allergies, current medications, past family history, past medical history, past social history, past surgical history and problem list.  Review of Systems   Constitution: Negative.   HENT: Negative.    Eyes: Negative.    Cardiovascular: Negative.    Respiratory: Negative.    Endocrine: Negative.    Hematologic/Lymphatic: Negative.    Skin: Negative.    Musculoskeletal: Negative.    Gastrointestinal: Negative.    Genitourinary: Negative.    Neurological: Negative.    Psychiatric/Behavioral: Negative.      Patient Active Problem List   Diagnosis   • Arteriovenous malformation (AVM)   • BASILIO (dyspnea on exertion)   • Arterio-venous malformation     Past Medical History:   Diagnosis Date   • Anxiety     TOOK MEDS IN THE PAST-NONE LAST TWO YRS   • AVM (arteriovenous malformation)    • History of kidney stones    • Shortness of breath on exertion    • Sleep apnea     NO C-PAP IN USE     Past Surgical History:   Procedure Laterality Date   • APPENDECTOMY     • CARDIAC CATHETERIZATION N/A 3/9/2018    Procedure: Right Heart Cath;  Surgeon: Vladislav  MD Loi;  Location: [facility] CATH INVASIVE LOCATION;  Service: Cardiovascular   • CARDIAC CATHETERIZATION N/A 3/9/2018    Procedure: Left Heart Cath;  Surgeon: Vladislav Monsivais MD;  Location: [facility] CATH INVASIVE LOCATION;  Service: Cardiovascular   • CARDIAC CATHETERIZATION N/A 3/9/2018    Procedure: Coronary angiography;  Surgeon: Vladislav Monsivais MD;  Location: [facility] INVASIVE LOCATION;  Service: Cardiovascular   • CARDIAC CATHETERIZATION N/A 3/9/2018    Procedure: Left ventriculography;  Surgeon: Vladislav Monsivais MD;  Location: [facility] INVASIVE LOCATION;  Service: Cardiovascular   • CYSTOSCOPY LITHOLAPAXY BLADDER STONE EXTRACTION     • FRACTURE SURGERY Right 1995    right leg   • GASTRIC BANDING REMOVAL  08/2006   • HYSTERECTOMY  1984   • INTERVENTIONAL RADIOLOGY PROCEDURE Bilateral 3/9/2018    Procedure: Pulmonary Angiogram;  Surgeon: Vladislav Monsivais MD;  Location: [facility] INVASIVE LOCATION;  Service: Cardiovascular   • LAPAROSCOPIC GASTRIC BANDING  08/2004   • THORACOSCOPY Left 6/15/2018    Procedure: BRONCHOSCOPY, LEFT VIDEO ASSISTED THORACOSCOPY WITH DAVINCI ROBOT WITH LIGATION AV MALFORMATION INTERCOSTAL NERVE BLOCK WITH CRYOPROBE;  Surgeon: Darryl Briseno III, MD;  Location: [facility];  Service: [facility]     Family History   Problem Relation Age of Onset   • Melanoma Mother    • Breast cancer Mother    • Lung cancer Father    • Diabetes Father    • Heart disease Father    • Hypertension Sister    • Heart attack Son    • Heart block Son    • Malig Hyperthermia Neg Hx      Social History     Social History   • Marital status:      Spouse name: N/A   • Number of children: N/A   • Years of education: N/A     Occupational History   • Not on file.     Social History Main Topics   • Smoking status: Never Smoker   • Smokeless tobacco: Never Used   • Alcohol use No      Comment: daily caffiene, 1 energy drink    • Drug use: No   • Sexual activity: Defer     Other Topics Concern   • Not on file     Social History  Narrative   • No narrative on file       Current Outpatient Prescriptions:   •  phentermine 30 MG capsule, Take 37.5 mg by mouth Every Morning. LAST DOSE 4/7/18, Disp: , Rfl:   No Known Allergies     Objective   Vitals:    10/10/18 1302   BP: 159/71   Pulse: 82   SpO2: 97%     Physical Exam   Constitutional: She is oriented to person, place, and time. She appears well-developed and well-nourished. No distress.   HENT:   Head: Normocephalic and atraumatic.   Eyes: Pupils are equal, round, and reactive to light. Conjunctivae and EOM are normal. No scleral icterus.   Neck: Normal range of motion. Neck supple. No JVD present. No tracheal deviation present.   Cardiovascular: Normal rate, regular rhythm, normal heart sounds and intact distal pulses.    No murmur heard.  Pulmonary/Chest: Effort normal and breath sounds normal. No stridor. No respiratory distress. She has no wheezes.   Abdominal: Soft. Bowel sounds are normal. She exhibits no mass.   Musculoskeletal: Normal range of motion. She exhibits no edema.   Lymphadenopathy:     She has no cervical adenopathy.   Neurological: She is alert and oriented to person, place, and time.   Skin: Skin is warm and dry. Capillary refill takes less than 2 seconds. She is not diaphoretic.   Psychiatric: She has a normal mood and affect. Her behavior is normal. Judgment and thought content normal.   Nursing note and vitals reviewed.    Independent Review of Radiographic Studies: I have independently reviewed Mrs. Pennington's CT angiogram of the chest performed with contrast on 10/2/18 and agree with the radiologist's interpretation.  The vessels of the AVM are largely thrombosed and have significantly decreased in size since her surgery.  There is a much smaller AV fistula that is present in the lingula of the left upper lobe, but it has also significantly decreased in size.    Assessment/Plan   Assessment: 1.  Arterial venous malformation: CTA of the chest performed on 10/2/18  demonstrates significant decrease in size of the AVM as well as an AV fistula present in the lingula of the left upper lobe.  Patient is stable and without complaints.     Plan: We will continue surveillance with another CT angiogram of the chest to be performed with contrast in 6 months.  I will have the patient follow up with Dr. Briseno at this time.    Of note, while the patient was hospitalized she was notified that the bronchoscope utilized during her surgical procedure was cleaned and disinfected, however not sterilized.  Hospital safety officers were involved and appropriate lab work was performed.  She is expecting a notification from the hospital regarding follow-up lab work sometime this month.  We are reaching out to the surgery department administration who was involved in this to be sure appropriate next steps are taken and the patient is followed by our .    Diagnoses and all orders for this visit:    Arteriovenous malformation (AVM)  -     CT angiogram chest w contrast; Future

## 2018-12-11 ENCOUNTER — OFFICE VISIT (OUTPATIENT)
Dept: OBSTETRICS AND GYNECOLOGY | Age: 58
End: 2018-12-11

## 2018-12-11 VITALS
SYSTOLIC BLOOD PRESSURE: 156 MMHG | BODY MASS INDEX: 30.19 KG/M2 | HEIGHT: 64 IN | WEIGHT: 176.8 LBS | DIASTOLIC BLOOD PRESSURE: 90 MMHG

## 2018-12-11 DIAGNOSIS — Z01.419 WELL WOMAN EXAM WITH ROUTINE GYNECOLOGICAL EXAM: Primary | ICD-10-CM

## 2018-12-11 PROCEDURE — 99386 PREV VISIT NEW AGE 40-64: CPT | Performed by: NURSE PRACTITIONER

## 2018-12-11 RX ORDER — ERGOCALCIFEROL 1.25 MG/1
CAPSULE ORAL
COMMUNITY
Start: 2018-11-12 | End: 2019-04-03

## 2018-12-11 RX ORDER — PHENTERMINE HYDROCHLORIDE 37.5 MG/1
TABLET ORAL
COMMUNITY
Start: 2018-11-12 | End: 2020-04-23

## 2018-12-11 NOTE — PROGRESS NOTES
[facility]  Routine Annual Visit    12/11/2018    Patient: Rylee Pennington          MR#:[id number]      History of Present Illness    58 y.o. female No obstetric history on file. [facility]. Last pap on file 2009. Last mammogram on file negative 2013. Pt reports several since then - latest at [facility] last month and reports 6 month follow up is needed- calling for records.  Hx MIRANDA with [facility] 1994 for DUB and prolapse. Last DEXA on file 2011 osteopenia but Rylee reports recent ordered by Dr. Downing.  She reports she is up to date on colonoscopy. She sees Dr. Downing for primary care. No complaints today. BP elevated today but Rylee states not surprised as anxious about being here and under stress lately.       No LMP recorded. Patient has had a hysterectomy.  Obstetric History:  OB History     No data available         Menstrual History:     No LMP recorded. Patient has had a hysterectomy.       Sexual History:       ________________________________________  Patient Active Problem List   Diagnosis   • Arteriovenous malformation (AVM)   • BASILIO (dyspnea on exertion)   • Arterio-venous malformation       Past Medical History:   Diagnosis Date   • Anxiety     TOOK MEDS IN THE PAST-NONE LAST TWO YRS   • AVM (arteriovenous malformation)    • History of kidney stones    • Shortness of breath on exertion    • Sleep apnea     NO C-PAP IN USE       Past Surgical History:   Procedure Laterality Date   • APPENDECTOMY     • CYSTOSCOPY LITHOLAPAXY BLADDER STONE EXTRACTION     • FRACTURE SURGERY Right 1995    right leg   • GASTRIC BANDING REMOVAL  08/2006   • HYSTERECTOMY  1984   • LAPAROSCOPIC GASTRIC BANDING  08/2004       Social History     Tobacco Use   Smoking Status Never Smoker   Smokeless Tobacco Never Used       Family History   Problem Relation Age of Onset   • Melanoma Mother    • Breast cancer Mother    • Lung cancer Father    • Diabetes Father    • Heart disease  "Father    • Hypertension Sister    • Heart attack Son    • Heart block Son    • Malig Hyperthermia Neg Hx        Prior to Admission medications    Medication Sig Start Date End Date Taking? Authorizing Provider   phentermine (ADIPEX-P) 37.5 MG tablet  11/12/18   Cristino Pierre MD   vitamin D (ERGOCALCIFEROL) 86788 units capsule capsule  11/12/18   Cristino Pierre MD   phentermine 30 MG capsule Take 37.5 mg by mouth Every Morning. LAST DOSE 4/7/18 12/11/18  Cristino Pierre MD     ________________________________________      The following portions of the patient's history were reviewed and updated as appropriate: allergies, current medications, past family history, past medical history, past social history, past surgical history and problem list.    Review of Systems   Constitutional: Negative.    HENT: Negative.    Eyes: Negative for visual disturbance.   Respiratory: Negative for cough, shortness of breath and wheezing.    Cardiovascular: Negative for chest pain, palpitations and leg swelling.   Gastrointestinal: Negative for abdominal distention, abdominal pain, blood in stool, constipation, diarrhea, nausea and vomiting.   Endocrine: Negative for cold intolerance and heat intolerance.   Genitourinary: Negative for difficulty urinating, dyspareunia, dysuria, frequency, genital sores, hematuria, menstrual problem, pelvic pain, urgency, vaginal bleeding, vaginal discharge and vaginal pain.   Musculoskeletal: Negative.    Skin: Negative.    Neurological: Negative for dizziness, weakness, light-headedness, numbness and headaches.   Hematological: Negative.    Psychiatric/Behavioral: Negative.    Breasts: negative for lumps skin changes, dimpling, swelling, nipple changes/discharge bilaterally           Objective   Physical Exam    /90   Ht 162.6 cm (64\")   Wt 80.2 kg (176 lb 12.8 oz)   BMI 30.35 kg/m²    BP Readings from Last 3 Encounters:   12/11/18 156/90   10/10/18 159/71   06/25/18 " "157/74      Wt Readings from Last 3 Encounters:   12/11/18 80.2 kg (176 lb 12.8 oz)   10/10/18 88.5 kg (195 lb)   06/25/18 88.5 kg (195 lb)        BMI: Estimated body mass index is 30.35 kg/m² as calculated from the following:    Height as of this encounter: 162.6 cm (64\").    Weight as of this encounter: 80.2 kg (176 lb 12.8 oz).        General:   alert, appears stated age and cooperative   Heart: regular rate and rhythm, S1, S2 normal, no murmur, click, rub or gallop   Lungs: clear to auscultation bilaterally   Abdomen: soft, non-tender, without masses or organomegaly   Breast: inspection negative, no nipple discharge or bleeding, no masses or nodularity palpable   Vulva: normal   Vagina: normal mucosa, normal discharge, atrophic appearance    Cervix: absent   Uterus: absent    Adnexa: no mass, fullness, tenderness     Assessment:    1. Normal annual exam   2. Healthy lifestyle modifications discussed, counseled on self breast exams and bone health. Encouraged yearly GYN exams and mammograms.    Plan:    []  Rx:   []  Mammogram request made  [x]  PAP done  []  Occult fecal blood test ([facility])  []  Labs:   []  GC/Chl/TV  []  DEXA scan   []  Referral for colonoscopy:           MATHEW South  12/11/2018 10:26 AM  "

## 2018-12-12 LAB
CONV .: NORMAL
CYTOLOGIST CVX/VAG CYTO: NORMAL
CYTOLOGY CVX/VAG DOC THIN PREP: NORMAL
DX ICD CODE: NORMAL
HIV 1 & 2 AB SER-IMP: NORMAL
OTHER STN SPEC: NORMAL
PATH REPORT.FINAL DX SPEC: NORMAL
STAT OF ADQ CVX/VAG CYTO-IMP: NORMAL

## 2018-12-13 ENCOUNTER — TELEPHONE (OUTPATIENT)
Dept: OBSTETRICS AND GYNECOLOGY | Age: 58
End: 2018-12-13

## 2018-12-13 ENCOUNTER — TELEPHONE (OUTPATIENT)
Dept: SURGERY | Facility: CLINIC | Age: 58
End: 2018-12-13

## 2018-12-13 DIAGNOSIS — Z77.21 EXPOSURE TO BLOOD-BORNE PATHOGEN: Primary | ICD-10-CM

## 2018-12-13 NOTE — TELEPHONE ENCOUNTER
XAVIER AND I PUT ORDERS IN FOR LABS FOR AN EXPOSURE PANEL AND [employer]UM[employer] STATED THAT SHE WOULD CALL THE PT AND MAKE HER AWARE.

## 2018-12-13 NOTE — TELEPHONE ENCOUNTER
----- Message from MATHEW Loya sent at 12/13/2018  9:18 AM EST -----  Please inform patient her pap smear returned negative (normal). Thank you.

## 2018-12-14 ENCOUNTER — LAB (OUTPATIENT)
Dept: LAB | Facility: HOSPITAL | Age: 58
End: 2018-12-14
Attending: THORACIC SURGERY (CARDIOTHORACIC VASCULAR SURGERY)

## 2018-12-14 DIAGNOSIS — Z77.21 EXPOSURE TO BLOOD-BORNE PATHOGEN: ICD-10-CM

## 2018-12-14 LAB
HBV SURFACE AB SER RIA-ACNC: NORMAL
HBV SURFACE AG SERPL QL IA: NORMAL
HCV AB SER DONR QL: NORMAL
HIV1 P24 AG SER QL: NORMAL
HIV1+2 AB SER QL: NEGATIVE

## 2018-12-14 PROCEDURE — 86706 HEP B SURFACE ANTIBODY: CPT

## 2018-12-14 PROCEDURE — 87340 HEPATITIS B SURFACE AG IA: CPT

## 2018-12-14 PROCEDURE — 86803 HEPATITIS C AB TEST: CPT

## 2018-12-14 PROCEDURE — G0432 EIA HIV-1/HIV-2 SCREEN: HCPCS

## 2018-12-14 PROCEDURE — 87899 AGENT NOS ASSAY W/OPTIC: CPT

## 2018-12-14 PROCEDURE — 36415 COLL VENOUS BLD VENIPUNCTURE: CPT

## 2019-02-04 ENCOUNTER — TRANSCRIBE ORDERS (OUTPATIENT)
Dept: ADMINISTRATIVE | Facility: HOSPITAL | Age: 59
End: 2019-02-04

## 2019-02-04 ENCOUNTER — HOSPITAL ENCOUNTER (OUTPATIENT)
Dept: GENERAL RADIOLOGY | Facility: HOSPITAL | Age: 59
Discharge: HOME | End: 2019-02-04
Admitting: FAMILY MEDICINE

## 2019-02-04 DIAGNOSIS — R09.89 CHEST RALES: Primary | ICD-10-CM

## 2019-02-04 DIAGNOSIS — R09.89 CHEST RALES: ICD-10-CM

## 2019-02-04 PROCEDURE — 71046 X-RAY EXAM CHEST 2 VIEWS: CPT

## 2019-03-27 ENCOUNTER — HOSPITAL ENCOUNTER (OUTPATIENT)
Dept: CT IMAGING | Facility: HOSPITAL | Age: 59
Discharge: HOME | End: 2019-03-27
Admitting: NURSE PRACTITIONER

## 2019-03-27 DIAGNOSIS — Q27.30 ARTERIOVENOUS MALFORMATION (AVM): ICD-10-CM

## 2019-03-27 LAB — CREAT BLDA-MCNC: 0.6 MG/DL (ref 0.6–1.3)

## 2019-03-27 PROCEDURE — 71275 CT ANGIOGRAPHY CHEST: CPT

## 2019-03-27 PROCEDURE — 0 IOPAMIDOL PER 1 ML: Performed by: NURSE PRACTITIONER

## 2019-03-27 PROCEDURE — 82565 ASSAY OF CREATININE: CPT

## 2019-03-27 RX ADMIN — IOPAMIDOL 95 ML: 755 INJECTION, SOLUTION INTRAVENOUS at 12:08

## 2019-04-03 ENCOUNTER — OFFICE VISIT (OUTPATIENT)
Dept: SURGERY | Facility: CLINIC | Age: 59
End: 2019-04-03

## 2019-04-03 VITALS
BODY MASS INDEX: 31.24 KG/M2 | SYSTOLIC BLOOD PRESSURE: 126 MMHG | WEIGHT: 183 LBS | HEIGHT: 64 IN | OXYGEN SATURATION: 98 % | HEART RATE: 79 BPM | DIASTOLIC BLOOD PRESSURE: 82 MMHG

## 2019-04-03 DIAGNOSIS — Q27.30 ARTERIOVENOUS MALFORMATION (AVM): Primary | ICD-10-CM

## 2019-04-03 PROCEDURE — 99213 OFFICE O/P EST LOW 20 MIN: CPT | Performed by: THORACIC SURGERY (CARDIOTHORACIC VASCULAR SURGERY)

## 2019-04-03 NOTE — PROGRESS NOTES
Subjective   Patient ID: Rylee Pennington is a 59 y.o. female is here today for follow-up.    History of Present Illness  Dear Colleague,  Rylee Pennington was seen in our office today for further follow-up of a robot-assisted ligation of a aorta to pulmonary artery fistula performed Christina 15, 2018.  Since her last visit she has done well.  She reports no shortness of breath or wheezing with exertion.  She is able to do normal daily activities without difficulty.  Her appetite is good and she has gained weight.  She reports no chest wall pain and no pleuritic pain.  She has no cough and no hemoptysis.    The following portions of the patient's history were reviewed and updated as appropriate: allergies, current medications, past family history, past medical history, past social history, past surgical history and problem list.  Review of Systems   Constitution: Negative.   HENT: Negative.    Eyes: Negative.    Cardiovascular: Negative.    Respiratory: Negative.    Endocrine: Negative.    Hematologic/Lymphatic: Negative.    Skin: Negative.    Musculoskeletal: Negative.    Gastrointestinal: Negative.    Genitourinary: Negative.    Neurological: Negative.    Psychiatric/Behavioral: Negative.    Allergic/Immunologic: Negative.      Patient Active Problem List   Diagnosis   • Arteriovenous malformation (AVM)   • BASILIO (dyspnea on exertion)   • Arterio-venous malformation     Past Medical History:   Diagnosis Date   • Anxiety     TOOK MEDS IN THE PAST-NONE LAST TWO YRS   • AVM (arteriovenous malformation)    • History of kidney stones    • Shortness of breath on exertion    • Sleep apnea     NO C-PAP IN USE     Past Surgical History:   Procedure Laterality Date   • APPENDECTOMY     • CARDIAC CATHETERIZATION N/A 3/9/2018    Procedure: Right Heart Cath;  Surgeon: Vladislav Monsivais MD;  Location: [facility] INVASIVE LOCATION;  Service: Cardiovascular   • CARDIAC CATHETERIZATION N/A 3/9/2018    Procedure: Left Heart Cath;  Surgeon:  Vladislav Monsivais MD;  [facility];  Service: Cardiovascular   • CARDIAC CATHETERIZATION N/A 3/9/2018    Procedure: Coronary angiography;  Surgeon: Vladislav Monsivais MD;  [facility];  Service: Cardiovascular   • CARDIAC CATHETERIZATION N/A 3/9/2018    Procedure: Left ventriculography;  Surgeon: Vladislav Monsivais MD;  [facility];  Service: Cardiovascular   • CYSTOSCOPY LITHOLAPAXY BLADDER STONE EXTRACTION     • FRACTURE SURGERY Right 1995    right leg   • GASTRIC BANDING REMOVAL  08/2006   • HYSTERECTOMY  1984   • INTERVENTIONAL RADIOLOGY PROCEDURE Bilateral 3/9/2018    Procedure: Pulmonary Angiogram;  Surgeon: Vladislav Monsivais MD;  [facility];  Service: Cardiovascular   • LAPAROSCOPIC GASTRIC BANDING  08/2004   • THORACOSCOPY Left 6/15/2018    Procedure: BRONCHOSCOPY, LEFT VIDEO ASSISTED THORACOSCOPY WITH DAVINCI ROBOT WITH LIGATION AV MALFORMATION INTERCOSTAL NERVE BLOCK WITH CRYOPROBE;  Surgeon: Darryl Briseno III, MD;  Location: [facility];  Service: [facility]     Family History   Problem Relation Age of Onset   • Melanoma Mother    • Breast cancer Mother    • Lung cancer Father    • Diabetes Father    • Heart disease Father    • Hypertension Sister    • Heart attack Son    • Heart block Son    • Malig Hyperthermia Neg Hx      Social History     Socioeconomic History   • Marital status:      Spouse name: Not on file   • Number of children: Not on file   • Years of education: Not on file   • Highest education level: Not on file   Tobacco Use   • Smoking status: Never Smoker   • Smokeless tobacco: Never Used   Substance and Sexual Activity   • Alcohol use: No     Comment: daily caffiene, 1 energy drink    • Drug use: No   • Sexual activity: Defer       Current Outpatient Medications:   •  phentermine (ADIPEX-P) 37.5 MG tablet, , Disp: , Rfl:   No Known Allergies     Objective   Vitals:    04/03/19 0847   BP: 126/82    Pulse: 79   SpO2: 98%     Physical Exam   Constitutional: She is oriented to person, place, and time. She appears well-developed and well-nourished.   HENT:   Head: Normocephalic.   Eyes: Conjunctivae, EOM and lids are normal. Pupils are equal, round, and reactive to light.   Neck: Trachea normal and normal range of motion. Neck supple. No hepatojugular reflux and no JVD present. Carotid bruit is not present. No thyroid mass and no thyromegaly present.   Cardiovascular: Normal rate, regular rhythm, S1 normal, S2 normal and normal pulses.  No extrasystoles are present. PMI is not displaced.   Murmur heard.   Systolic murmur is present with a grade of 2/6.  Pulmonary/Chest: Effort normal and breath sounds normal.   Bruit over left lung is resolved   Abdominal: Soft. Normal appearance and bowel sounds are normal. She exhibits no mass. There is no hepatosplenomegaly. There is no tenderness. No hernia.   Musculoskeletal: Normal range of motion.   Neurological: She is alert and oriented to person, place, and time. She has normal strength and normal reflexes. No cranial nerve deficit or sensory deficit. She displays a negative Romberg sign.   Skin: Skin is warm, dry and intact.   Psychiatric: She has a normal mood and affect. Her speech is normal and behavior is normal. Judgment and thought content normal. Cognition and memory are normal.     Independent Review of Radiographic Studies:    I have independently reviewed the CT angiogram of the chest performed March 27, 2019 and compared it to the preoperative exam.  Postsurgical changes from prior ligation of left lower lobe pulmonary  arterial fistulas are present, as before. A persistent fistulous  connection is present between left upper and left lower lobe branches of  the pulmonary artery with an intervening irregular tangle of pulmonary  arteries providing multiple fistulous connections within the left lower  lobe, as before. There has been progressive thrombosis of  the  preoperative dominant portions of the fistula since 10/02/2018. The  remainder of the fistula which largely interconnect branches of the left  upper and lower lobe pulmonary arteries are grossly stable. Small  outpouching from the descending thoracic aorta and the area of the  previous fistulous connection measures 1 x 0.6 cm, as before. There is a  small caliber vessel just lateral to this outpouching from the  descending thoracic aorta which demonstrates progressive enhancement in  a manner similar to the adjacent aorta and appears to connect with the  adjacent stump of aortic tissue. This is best visualized on coronal  imaging and appears grossly unchanged since 10/02/2018.     The ascending aorta is enlarged, measuring 3.8 cm, as before. The main  pulmonary artery is normal in size.     There is no pulmonary consolidation, pleural effusion or pneumothorax.     Old left rib fracture. No suspicious lytic or blastic osseous lesions  are present.    Assessment/Plan     The patient continues to do well from her surgery.  Dyspnea on exertion that she was experiencing preoperatively has resolved.  She will require follow-up because of the residual AV malformations to make sure that non-enlarge.  She will return in 1 year with a repeat CT angiogram of the chest.  I will keep you informed of her progress.    Diagnoses and all orders for this visit:    Arteriovenous malformation (AVM)  -     CT Angiogram Chest With & Without Contrast; Future

## 2020-04-02 ENCOUNTER — APPOINTMENT (OUTPATIENT)
Dept: CT IMAGING | Facility: HOSPITAL | Age: 60
End: 2020-04-02

## 2020-04-23 ENCOUNTER — APPOINTMENT (OUTPATIENT)
Dept: GENERAL RADIOLOGY | Facility: HOSPITAL | Age: 60
End: 2020-04-23

## 2020-04-23 ENCOUNTER — HOSPITAL ENCOUNTER (EMERGENCY)
Facility: HOSPITAL | Age: 60
Discharge: HOME | End: 2020-04-23
Attending: EMERGENCY MEDICINE | Admitting: EMERGENCY MEDICINE

## 2020-04-23 ENCOUNTER — APPOINTMENT (OUTPATIENT)
Dept: CT IMAGING | Facility: HOSPITAL | Age: 60
End: 2020-04-23

## 2020-04-23 VITALS
TEMPERATURE: 97.6 F | DIASTOLIC BLOOD PRESSURE: 89 MMHG | SYSTOLIC BLOOD PRESSURE: 144 MMHG | HEIGHT: 65 IN | HEART RATE: 77 BPM | WEIGHT: 210 LBS | OXYGEN SATURATION: 97 % | RESPIRATION RATE: 18 BRPM | BODY MASS INDEX: 34.99 KG/M2

## 2020-04-23 DIAGNOSIS — R07.89 ATYPICAL CHEST PAIN: Primary | ICD-10-CM

## 2020-04-23 DIAGNOSIS — K44.9 HIATAL HERNIA: ICD-10-CM

## 2020-04-23 LAB
ALBUMIN SERPL-MCNC: 4.2 G/DL (ref 3.5–5.2)
ALBUMIN/GLOB SERPL: 1.2 G/DL
ALP SERPL-CCNC: 85 U/L (ref 39–117)
ALT SERPL W P-5'-P-CCNC: 15 U/L (ref 1–33)
ANION GAP SERPL CALCULATED.3IONS-SCNC: 11.9 MMOL/L (ref 5–15)
AST SERPL-CCNC: 11 U/L (ref 1–32)
BASOPHILS # BLD AUTO: 0.05 10*3/MM3 (ref 0–0.2)
BASOPHILS NFR BLD AUTO: 0.7 % (ref 0–1.5)
BILIRUB SERPL-MCNC: 0.4 MG/DL (ref 0.2–1.2)
BUN BLD-MCNC: 25 MG/DL (ref 8–23)
BUN/CREAT SERPL: 43.1 (ref 7–25)
CALCIUM SPEC-SCNC: 9.6 MG/DL (ref 8.6–10.5)
CHLORIDE SERPL-SCNC: 101 MMOL/L (ref 98–107)
CO2 SERPL-SCNC: 26.1 MMOL/L (ref 22–29)
CREAT BLD-MCNC: 0.58 MG/DL (ref 0.57–1)
D DIMER PPP FEU-MCNC: 0.64 MCGFEU/ML (ref 0–0.46)
DEPRECATED RDW RBC AUTO: 42.8 FL (ref 37–54)
EOSINOPHIL # BLD AUTO: 0.33 10*3/MM3 (ref 0–0.4)
EOSINOPHIL NFR BLD AUTO: 4.4 % (ref 0.3–6.2)
ERYTHROCYTE [DISTWIDTH] IN BLOOD BY AUTOMATED COUNT: 13 % (ref 12.3–15.4)
GFR SERPL CREATININE-BSD FRML MDRD: 106 ML/MIN/1.73
GLOBULIN UR ELPH-MCNC: 3.4 GM/DL
GLUCOSE BLD-MCNC: 83 MG/DL (ref 65–99)
HCT VFR BLD AUTO: 40.7 % (ref 34–46.6)
HGB BLD-MCNC: 13.3 G/DL (ref 12–15.9)
IMM GRANULOCYTES # BLD AUTO: 0.01 10*3/MM3 (ref 0–0.05)
IMM GRANULOCYTES NFR BLD AUTO: 0.1 % (ref 0–0.5)
LIPASE SERPL-CCNC: 44 U/L (ref 13–60)
LYMPHOCYTES # BLD AUTO: 2.54 10*3/MM3 (ref 0.7–3.1)
LYMPHOCYTES NFR BLD AUTO: 33.7 % (ref 19.6–45.3)
MCH RBC QN AUTO: 29.3 PG (ref 26.6–33)
MCHC RBC AUTO-ENTMCNC: 32.7 G/DL (ref 31.5–35.7)
MCV RBC AUTO: 89.6 FL (ref 79–97)
MONOCYTES # BLD AUTO: 0.66 10*3/MM3 (ref 0.1–0.9)
MONOCYTES NFR BLD AUTO: 8.8 % (ref 5–12)
NEUTROPHILS # BLD AUTO: 3.95 10*3/MM3 (ref 1.7–7)
NEUTROPHILS NFR BLD AUTO: 52.3 % (ref 42.7–76)
NT-PROBNP SERPL-MCNC: 19.4 PG/ML (ref 5–900)
PLATELET # BLD AUTO: 203 10*3/MM3 (ref 140–450)
PMV BLD AUTO: 9.8 FL (ref 6–12)
POTASSIUM BLD-SCNC: 4.1 MMOL/L (ref 3.5–5.2)
PROT SERPL-MCNC: 7.6 G/DL (ref 6–8.5)
RBC # BLD AUTO: 4.54 10*6/MM3 (ref 3.77–5.28)
SODIUM BLD-SCNC: 139 MMOL/L (ref 136–145)
TROPONIN T SERPL-MCNC: <0.01 NG/ML (ref 0–0.03)
TROPONIN T SERPL-MCNC: <0.01 NG/ML (ref 0–0.03)
WBC NRBC COR # BLD: 7.54 10*3/MM3 (ref 3.4–10.8)

## 2020-04-23 PROCEDURE — 83880 ASSAY OF NATRIURETIC PEPTIDE: CPT | Performed by: PHYSICIAN ASSISTANT

## 2020-04-23 PROCEDURE — 83690 ASSAY OF LIPASE: CPT | Performed by: PHYSICIAN ASSISTANT

## 2020-04-23 PROCEDURE — 84484 ASSAY OF TROPONIN QUANT: CPT | Performed by: PHYSICIAN ASSISTANT

## 2020-04-23 PROCEDURE — 99284 EMERGENCY DEPT VISIT MOD MDM: CPT

## 2020-04-23 PROCEDURE — 0 IOPAMIDOL PER 1 ML: Performed by: EMERGENCY MEDICINE

## 2020-04-23 PROCEDURE — 71275 CT ANGIOGRAPHY CHEST: CPT

## 2020-04-23 PROCEDURE — 93010 ELECTROCARDIOGRAM REPORT: CPT | Performed by: INTERNAL MEDICINE

## 2020-04-23 PROCEDURE — 99284 EMERGENCY DEPT VISIT MOD MDM: CPT | Performed by: PHYSICIAN ASSISTANT

## 2020-04-23 PROCEDURE — 85379 FIBRIN DEGRADATION QUANT: CPT | Performed by: PHYSICIAN ASSISTANT

## 2020-04-23 PROCEDURE — 85025 COMPLETE CBC W/AUTO DIFF WBC: CPT | Performed by: PHYSICIAN ASSISTANT

## 2020-04-23 PROCEDURE — 93005 ELECTROCARDIOGRAM TRACING: CPT | Performed by: EMERGENCY MEDICINE

## 2020-04-23 PROCEDURE — 71045 X-RAY EXAM CHEST 1 VIEW: CPT

## 2020-04-23 PROCEDURE — 80053 COMPREHEN METABOLIC PANEL: CPT | Performed by: PHYSICIAN ASSISTANT

## 2020-04-23 RX ORDER — SODIUM CHLORIDE 0.9 % (FLUSH) 0.9 %
10 SYRINGE (ML) INJECTION AS NEEDED
Status: DISCONTINUED | OUTPATIENT
Start: 2020-04-23 | End: 2020-04-24 | Stop reason: HOSPADM

## 2020-04-23 RX ORDER — NITROGLYCERIN 0.4 MG/1
0.4 TABLET SUBLINGUAL
Status: DISCONTINUED | OUTPATIENT
Start: 2020-04-23 | End: 2020-04-24 | Stop reason: HOSPADM

## 2020-04-23 RX ORDER — ASPIRIN 81 MG/1
324 TABLET, CHEWABLE ORAL ONCE
Status: COMPLETED | OUTPATIENT
Start: 2020-04-23 | End: 2020-04-23

## 2020-04-23 RX ORDER — IRBESARTAN AND HYDROCHLOROTHIAZIDE 150; 12.5 MG/1; MG/1
1 TABLET, FILM COATED ORAL DAILY
COMMUNITY
End: 2021-12-27

## 2020-04-23 RX ADMIN — IOPAMIDOL 100 ML: 755 INJECTION, SOLUTION INTRAVENOUS at 19:21

## 2020-04-23 RX ADMIN — ASPIRIN 81 MG 324 MG: 81 TABLET ORAL at 18:27

## 2020-04-23 RX ADMIN — NITROGLYCERIN 0.4 MG: 0.4 TABLET SUBLINGUAL at 18:28

## 2020-05-04 ENCOUNTER — HOSPITAL ENCOUNTER (OUTPATIENT)
Dept: CT IMAGING | Facility: HOSPITAL | Age: 60
Discharge: HOME | End: 2020-05-04

## 2020-05-04 DIAGNOSIS — Q27.30 ARTERIOVENOUS MALFORMATION (AVM): ICD-10-CM

## 2020-05-12 ENCOUNTER — OFFICE VISIT (OUTPATIENT)
Dept: SURGERY | Facility: CLINIC | Age: 60
End: 2020-05-12

## 2020-05-12 DIAGNOSIS — Q27.30 ARTERIOVENOUS MALFORMATION (AVM): Primary | ICD-10-CM

## 2020-05-12 PROCEDURE — 99442 PR PHYS/QHP TELEPHONE EVALUATION 11-20 MIN: CPT | Performed by: THORACIC SURGERY (CARDIOTHORACIC VASCULAR SURGERY)

## 2020-05-12 NOTE — PROGRESS NOTES
Subjective   Patient ID: Rylee Pennington is a 60 y.o. female is here today for follow-up.    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  Since her last visit she has done well.  She has gained some weight and with this she has noticed some increase in shortness of breath.  She has no cough or hemoptysis.  She has no pleuritic pain.  She has no chest wall pain.  She has resumed her normal activities and is tolerating this well.    The following portions of the patient's history were reviewed and updated as appropriate: allergies, current medications, past family history, past medical history, past social history, past surgical history and problem list.  Review of Systems   Constitution: Negative.   HENT: Negative.    Eyes: Negative.    Cardiovascular: Negative.    Respiratory: Positive for shortness of breath.    Endocrine: Negative.    Hematologic/Lymphatic: Negative.    Skin: Negative.    Musculoskeletal: Negative.    Gastrointestinal: Negative.    Genitourinary: Negative.    Neurological: Negative.    Psychiatric/Behavioral: Negative.      Patient Active Problem List   Diagnosis   • Arteriovenous malformation (AVM)   • BASILIO (dyspnea on exertion)   • Arterio-venous malformation     Past Medical History:   Diagnosis Date   • Anxiety     TOOK MEDS IN THE PAST-NONE LAST TWO YRS   • AVM (arteriovenous malformation)    • History of kidney stones    • Shortness of breath on exertion    • Sleep apnea     NO C-PAP IN USE     Past Surgical History:   Procedure Laterality Date   • APPENDECTOMY     • CARDIAC CATHETERIZATION N/A 3/9/2018    Procedure: Right Heart Cath;  Surgeon: Vladislav Monsivais MD;  Location: [facility] INVASIVE LOCATION;  Service: Cardiovascular   • CARDIAC CATHETERIZATION N/A 3/9/2018    Procedure: Left Heart Cath;  Surgeon: Vladislav Monsivais MD;   Location: [facility] CATH INVASIVE LOCATION;  Service: Cardiovascular   • CARDIAC CATHETERIZATION N/A 3/9/2018    Procedure: Coronary angiography;  Surgeon: Vladislav Monsivais MD;  Location: [facility] CATH INVASIVE LOCATION;  Service: Cardiovascular   • CARDIAC CATHETERIZATION N/A 3/9/2018    Procedure: Left ventriculography;  Surgeon: Vladislav Monsivais MD;  Location: [facility] CATH INVASIVE LOCATION;  Service: Cardiovascular   • CYSTOSCOPY LITHOLAPAXY BLADDER STONE EXTRACTION     • FRACTURE SURGERY Right 1995    right leg   • GASTRIC BANDING REMOVAL  08/2006   • HYSTERECTOMY  1984   • INTERVENTIONAL RADIOLOGY PROCEDURE Bilateral 3/9/2018    Procedure: Pulmonary Angiogram;  Surgeon: Vladislav Monsivais MD;  Location: [facility] CATH INVASIVE LOCATION;  Service: Cardiovascular   • LAPAROSCOPIC GASTRIC BANDING  08/2004   • THORACOSCOPY Left 6/15/2018    Procedure: BRONCHOSCOPY, LEFT VIDEO ASSISTED THORACOSCOPY WITH DAVINCI ROBOT WITH LIGATION AV MALFORMATION INTERCOSTAL NERVE BLOCK WITH CRYOPROBE;  Surgeon: Darryl Briseno III, MD;  Location: [facility] OR;  Service: [facility]     Family History   Problem Relation Age of Onset   • Melanoma Mother    • Breast cancer Mother    • Lung cancer Father    • Diabetes Father    • Heart disease Father    • Hypertension Sister    • Heart attack Son    • Heart block Son    • Malig Hyperthermia Neg Hx      Social History     Socioeconomic History   • Marital status:      Spouse name: Not on file   • Number of children: Not on file   • Years of education: Not on file   • Highest education level: Not on file   Tobacco Use   • Smoking status: Never Smoker   • Smokeless tobacco: Never Used   Substance and Sexual Activity   • Alcohol use: No     Comment: daily caffiene, 1 energy drink    • Drug use: No   • Sexual activity: Defer       Current Outpatient Medications:   •  irbesartan-hydrochlorothiazide (AVALIDE) 150-12.5 MG tablet, Take 1 tablet by mouth Daily., Disp: , Rfl:   No Known Allergies     Objective    There were no vitals filed for this visit.  Physical Exam     Telemedicine visit    Independent Review of Radiographic Studies:    CT angiogram performed April 23, 2020 was independently reviewed and compared to previous x-rays.  Main pulmonary artery is normal in caliber.  There are no filling defects and no evidence of pulmonary embolus.  There has been resolution of the varices in the left lung.  There are no infiltrates nodules or masses.  There is no pleural effusion.  There is no suspicious hilar or mediastinal adenopathy.      Assessment/Plan     Pulmonary vasculature appears to have returned to normal.  There is no evidence for recurrence of the AV malformation.  Patient has made a good recovery from her surgery and has resumed normal activities.  Since she is 2 years postop I feel that further follow-up with CT angiograms is not required.  I have recommended that she follow-up with her primary care physician.  I will be glad to see her at anytime in the future she feels it is necessary.    I spent 11 minutes talking with the patient by telephone      Diagnoses and all orders for this visit:    Arteriovenous malformation (AVM)

## 2021-10-05 ENCOUNTER — HOSPITAL ENCOUNTER (OUTPATIENT)
Dept: GENERAL RADIOLOGY | Facility: HOSPITAL | Age: 61
Discharge: HOME | End: 2021-10-05
Admitting: FAMILY MEDICINE

## 2021-10-05 ENCOUNTER — TRANSCRIBE ORDERS (OUTPATIENT)
Dept: ADMINISTRATIVE | Facility: HOSPITAL | Age: 61
End: 2021-10-05

## 2021-10-05 DIAGNOSIS — M25.559 HIP PAIN: Primary | ICD-10-CM

## 2021-10-05 DIAGNOSIS — M25.559 HIP PAIN: ICD-10-CM

## 2021-10-05 PROCEDURE — 73502 X-RAY EXAM HIP UNI 2-3 VIEWS: CPT

## 2021-12-27 ENCOUNTER — OFFICE VISIT (OUTPATIENT)
Dept: ORTHOPEDIC SURGERY | Facility: CLINIC | Age: 61
End: 2021-12-27

## 2021-12-27 VITALS
WEIGHT: 235 LBS | BODY MASS INDEX: 40.12 KG/M2 | DIASTOLIC BLOOD PRESSURE: 86 MMHG | SYSTOLIC BLOOD PRESSURE: 164 MMHG | HEART RATE: 54 BPM | HEIGHT: 64 IN

## 2021-12-27 DIAGNOSIS — M70.62 GREATER TROCHANTERIC BURSITIS OF LEFT HIP: ICD-10-CM

## 2021-12-27 DIAGNOSIS — M16.12 PRIMARY LOCALIZED OSTEOARTHRITIS OF LEFT HIP: Primary | ICD-10-CM

## 2021-12-27 PROCEDURE — 99203 OFFICE O/P NEW LOW 30 MIN: CPT | Performed by: NURSE PRACTITIONER

## 2021-12-27 PROCEDURE — 20610 DRAIN/INJ JOINT/BURSA W/O US: CPT | Performed by: NURSE PRACTITIONER

## 2021-12-27 RX ORDER — LIDOCAINE HYDROCHLORIDE 10 MG/ML
4 INJECTION, SOLUTION EPIDURAL; INFILTRATION; INTRACAUDAL; PERINEURAL
Status: COMPLETED | OUTPATIENT
Start: 2021-12-27 | End: 2021-12-27

## 2021-12-27 RX ORDER — IBUPROFEN 800 MG/1
800 TABLET ORAL EVERY 6 HOURS PRN
COMMUNITY
End: 2022-06-16

## 2021-12-27 RX ORDER — TRIAMCINOLONE ACETONIDE 40 MG/ML
80 INJECTION, SUSPENSION INTRA-ARTICULAR; INTRAMUSCULAR
Status: COMPLETED | OUTPATIENT
Start: 2021-12-27 | End: 2021-12-27

## 2021-12-27 RX ADMIN — LIDOCAINE HYDROCHLORIDE 4 ML: 10 INJECTION, SOLUTION EPIDURAL; INFILTRATION; INTRACAUDAL; PERINEURAL at 09:40

## 2021-12-27 RX ADMIN — TRIAMCINOLONE ACETONIDE 80 MG: 40 INJECTION, SUSPENSION INTRA-ARTICULAR; INTRAMUSCULAR at 09:40

## 2021-12-27 NOTE — PROGRESS NOTES
Subjective:     Patient ID: Rylee Pennington is a 61 y.o. female.    Chief Complaint:  Left hip pain, new patient to examiner  History of Present Illness  Rylee Pennington 62-year-old female presents to clinic for evaluation of her left hip.  Maximal tenderness present at the lateral aspect of the hip with pain radiating down the lateral aspect of the lower extremity crossing over to the front of the knee.  Rates discomfort a 9-10 out of 10 described as aching, throbbing, sharp, shooting in nature.  Increased pain noted with the left lower extremity extended out, attempting to put on shoes and with all day-to-day activities.  Pain present when she is standing and seated.  Currently sleeping in a chair because of discomfort.  She has tried oral steroids 10/5/2021 without significant symptom relief.  She has been referred to primary care for x-ray images which are available for viewing in chart.  She currently works as a  which does require her to stand throughout the day which exacerbates her symptoms.  Denies any prior corticosteroid injections to the past.  Currently taking ibuprofen daily for symptom relief.  Pain is radiating to the groin denies any presence of numbness or tingling at the left lower extremity.  Denies other concerns present time.    Social History     Occupational History   • Not on file   Tobacco Use   • Smoking status: Never Smoker   • Smokeless tobacco: Never Used   Vaping Use   • Vaping Use: Never used   Substance and Sexual Activity   • Alcohol use: No     Comment: daily caffiene, 1 energy drink    • Drug use: No   • Sexual activity: Defer      Past Medical History:   Diagnosis Date   • Anxiety     TOOK MEDS IN THE PAST-NONE LAST TWO YRS   • AVM (arteriovenous malformation)    • Fracture, fibula    • Fracture, tibia and fibula    • History of kidney stones    • Hypertension    • Shortness of breath on exertion    • Sleep apnea     NO C-PAP IN USE     Past Surgical History:    Procedure Laterality Date   • APPENDECTOMY     • CARDIAC CATHETERIZATION N/A 3/9/2018    Procedure: Right Heart Cath;  Surgeon: Vladislav Monsivais MD;  [facility];  Service: Cardiovascular   • CARDIAC CATHETERIZATION N/A 3/9/2018    Procedure: Left Heart Cath;  Surgeon: Vladislav Monsivais MD;  Location: [facility] CATH INVASIVE LOCATION;  Service: Cardiovascular   • CARDIAC CATHETERIZATION N/A 3/9/2018    Procedure: Coronary angiography;  Surgeon: Vladislav Monsivais MD;  Location: [facility] CATH INVASIVE LOCATION;  Service: Cardiovascular   • CARDIAC CATHETERIZATION N/A 3/9/2018    Procedure: Left ventriculography;  Surgeon: Vladislav Monsivais MD;  Location: [facility] CATH INVASIVE LOCATION;  Service: Cardiovascular   • CYSTOSCOPY LITHOLAPAXY BLADDER STONE EXTRACTION     • FRACTURE SURGERY Right 1995    right leg   • GASTRIC BANDING REMOVAL  08/2006   • HYSTERECTOMY  1984   • INTERVENTIONAL RADIOLOGY PROCEDURE Bilateral 3/9/2018    Procedure: Pulmonary Angiogram;  Surgeon: Vladislav Monsivais MD;  Location: [facility] INVASIVE LOCATION;  Service: Cardiovascular   • LAPAROSCOPIC GASTRIC BANDING  08/2004   • THORACOSCOPY Left 6/15/2018    Procedure: BRONCHOSCOPY, LEFT VIDEO ASSISTED THORACOSCOPY WITH DAVINCI ROBOT WITH LIGATION AV MALFORMATION INTERCOSTAL NERVE BLOCK WITH CRYOPROBE;  Surgeon: Darryl Briseno III, MD;  Location: [facility] OR;  Service: [facility]       Family History   Problem Relation Age of Onset   • Melanoma Mother    • Breast cancer Mother    • Lung cancer Father    • Diabetes Father    • Heart disease Father    • Hypertension Sister    • Heart attack Son    • Heart block Son    • Malig Hyperthermia Neg Hx        Objective:  Physical Exam    Vital signs reviewed.   General: No acute distress.  Eyes: conjunctiva clear; pupils equally round and reactive  ENT: external ears and nose atraumatic; oropharynx clear  CV: no peripheral edema  Resp: normal respiratory effort  Skin: no rashes or wounds; normal turgor  Psych:  "mood and affect appropriate; recent and remote memory intact    Vitals:    12/27/21 0916   BP: 164/86   Pulse: 54   Weight: 107 kg (235 lb)   Height: 162.6 cm (64\")         12/27/21 0916   Weight: 107 kg (235 lb)     Body mass index is 40.34 kg/m².      Left Hip Exam     Tenderness   The patient is experiencing tenderness in the greater trochanter and lateral.    Range of Motion   Abduction: 40   Adduction: 20   Extension: 0     Tests   Aleksandr: positive    Other   Erythema: absent  Sensation: normal  Pulse: present    Comments:  Positive logroll exam  Positive stinchfield exam                  Imaging:  XR Hip With or Without Pelvis 2 - 3 View Left    Result Date: 10/5/2021  Degenerative changes of the left hip. No fracture.  This report was finalized on 10/5/2021 4:47 PM by Dr. Doni Maher MD.    Independently reviewed three-view x-ray images left hip previously completed BH lag moderate to advanced osteoarthritis left hip with osteophytes present in the inferior and superior femoral head moderate osteoarthritis right hip noted on AP view  Assessment:        1. Primary localized osteoarthritis of left hip    2. Greater trochanteric bursitis of left hip           Plan:  1.  Discussed plan of care with patient.  Discussed treatment options including corticosteroid injection under fluoroscopy into the hip joint which she does wish to proceed with as well as corticosteroid injection the greater trochanter.  I do recommend application of ice at injection sites.  Plan to see her back in clinic in 6 weeks to reevaluate.  Discussed with patient if she is needing to be seen sooner we will gladly see back in clinic before that time.  She verbalized understanding of information agrees with plan of care.  Denies other concerns present time.  Large Joint Arthrocentesis: L greater trochanteric bursa  Date/Time: 12/27/2021 9:40 AM  Consent given by: patient  Site marked: site marked  Timeout: Immediately prior to procedure a " time out was called to verify the correct patient, procedure, equipment, support staff and site/side marked as required   Supporting Documentation  Indications: pain   Procedure Details  Location: hip - L greater trochanteric bursa  Preparation: Patient was prepped and draped in the usual sterile fashion  Needle size: 22 G  Approach: lateral  Medications administered: 80 mg triamcinolone acetonide 40 MG/ML; 4 mL lidocaine PF 1% 1 %  Patient tolerance: patient tolerated the procedure well with no immediate complications          Orders:  Orders Placed This Encounter   Procedures   • Large Joint Arthrocentesis: L greater trochanteric bursa   • FL Guide For Pain Meds Injection Joint       Medications:  No orders of the defined types were placed in this encounter.      Followup:  No follow-ups on file.    Diagnoses and all orders for this visit:    1. Primary localized osteoarthritis of left hip (Primary)  -     FL Guide For Pain Meds Injection Joint; Future    2. Greater trochanteric bursitis of left hip    Other orders  -     Large Joint Arthrocentesis: L greater trochanteric bursa          I ordered and reviewed the LILY today.     Dictated utilizing Dragon dictation

## 2022-01-05 ENCOUNTER — HOSPITAL ENCOUNTER (OUTPATIENT)
Dept: GENERAL RADIOLOGY | Facility: HOSPITAL | Age: 62
Discharge: HOME | End: 2022-01-05
Admitting: NURSE PRACTITIONER

## 2022-01-05 DIAGNOSIS — M16.12 PRIMARY LOCALIZED OSTEOARTHRITIS OF LEFT HIP: ICD-10-CM

## 2022-01-05 PROCEDURE — 0 LIDOCAINE 1 % SOLUTION: Performed by: NURSE PRACTITIONER

## 2022-01-05 PROCEDURE — 77002 NEEDLE LOCALIZATION BY XRAY: CPT

## 2022-01-05 PROCEDURE — 25010000002 IOPAMIDOL 61 % SOLUTION: Performed by: NURSE PRACTITIONER

## 2022-01-05 PROCEDURE — 25010000002 METHYLPREDNISOLONE PER 40 MG: Performed by: NURSE PRACTITIONER

## 2022-01-05 RX ORDER — METHYLPREDNISOLONE ACETATE 40 MG/ML
80 INJECTION, SUSPENSION INTRA-ARTICULAR; INTRALESIONAL; INTRAMUSCULAR; SOFT TISSUE ONCE
Status: COMPLETED | OUTPATIENT
Start: 2022-01-05 | End: 2022-01-05

## 2022-01-05 RX ORDER — BUPIVACAINE HYDROCHLORIDE 2.5 MG/ML
5 INJECTION, SOLUTION EPIDURAL; INFILTRATION; INTRACAUDAL ONCE
Status: COMPLETED | OUTPATIENT
Start: 2022-01-05 | End: 2022-01-05

## 2022-01-05 RX ORDER — LIDOCAINE HYDROCHLORIDE 10 MG/ML
5 INJECTION, SOLUTION INFILTRATION; PERINEURAL ONCE
Status: COMPLETED | OUTPATIENT
Start: 2022-01-05 | End: 2022-01-05

## 2022-01-05 RX ADMIN — LIDOCAINE HYDROCHLORIDE 5 ML: 10 INJECTION, SOLUTION INFILTRATION; PERINEURAL at 11:05

## 2022-01-05 RX ADMIN — METHYLPREDNISOLONE ACETATE 80 MG: 40 INJECTION, SUSPENSION INTRA-ARTICULAR; INTRALESIONAL; INTRAMUSCULAR; SOFT TISSUE at 11:06

## 2022-01-05 RX ADMIN — BUPIVACAINE HYDROCHLORIDE 5 ML: 2.5 INJECTION, SOLUTION EPIDURAL; INFILTRATION; INTRACAUDAL; PERINEURAL at 11:06

## 2022-01-05 RX ADMIN — IOPAMIDOL 10 ML: 612 INJECTION, SOLUTION INTRAVENOUS at 11:05

## 2022-02-07 ENCOUNTER — OFFICE VISIT (OUTPATIENT)
Dept: ORTHOPEDIC SURGERY | Facility: CLINIC | Age: 62
End: 2022-02-07

## 2022-02-07 VITALS — HEIGHT: 64 IN | WEIGHT: 235 LBS | BODY MASS INDEX: 40.12 KG/M2

## 2022-02-07 DIAGNOSIS — M16.12 PRIMARY LOCALIZED OSTEOARTHRITIS OF LEFT HIP: Primary | ICD-10-CM

## 2022-02-07 DIAGNOSIS — M70.62 GREATER TROCHANTERIC BURSITIS OF LEFT HIP: ICD-10-CM

## 2022-02-07 PROCEDURE — 99213 OFFICE O/P EST LOW 20 MIN: CPT | Performed by: NURSE PRACTITIONER

## 2022-02-07 NOTE — PROGRESS NOTES
Subjective:     Patient ID: Rylee Pennington is a 61 y.o. female.    Chief Complaint:  Follow-up greater trochanteric bursitis, left  Corticosteroid injection 12/27/2021  History of Present Illness  Rylee Pennington returns to clinic for follow-up of her left hip.  Has received corticosteroid injection last visit, greater trochanter on 12/27/2021 as well as fluoroscopy guided injection on 1/5/2021.  Presents today for follow-up visit.  Reports approximately 90% symptom relief of pain at the left hip.  She does continue to experience some discomfort with internal rotation and hip flexion but otherwise noted great symptom improvement.  She is now able to put on take off her shoes and tie her shoes did note some pain with lifting the left lower extremity over the bathtub but otherwise improving.  Denies any presence of numbness or tingling at the left lower extremity.  Denies other concerns present time.     Social History     Occupational History   • Not on file   Tobacco Use   • Smoking status: Never Smoker   • Smokeless tobacco: Never Used   Vaping Use   • Vaping Use: Never used   Substance and Sexual Activity   • Alcohol use: No     Comment: daily caffiene, 1 energy drink    • Drug use: No   • Sexual activity: Defer      Past Medical History:   Diagnosis Date   • Anxiety     TOOK MEDS IN THE PAST-NONE LAST TWO YRS   • AVM (arteriovenous malformation)    • Fracture, fibula    • Fracture, tibia and fibula    • History of kidney stones    • Hypertension    • Shortness of breath on exertion    • Sleep apnea     NO C-PAP IN USE     Past Surgical History:   Procedure Laterality Date   • APPENDECTOMY     • CARDIAC CATHETERIZATION N/A 3/9/2018    Procedure: Right Heart Cath;  Surgeon: Vladislav Monsivais MD;  Location: [facility] INVASIVE LOCATION;  Service: Cardiovascular   • CARDIAC CATHETERIZATION N/A 3/9/2018    Procedure: Left Heart Cath;  Surgeon: Vladislav Monsivais MD;  Location: [facility] CATH INVASIVE LOCATION;  Service:  "Cardiovascular   • CARDIAC CATHETERIZATION N/A 3/9/2018    Procedure: Coronary angiography;  Surgeon: Vladislav Monsivais MD;  [facility];  Service: Cardiovascular   • CARDIAC CATHETERIZATION N/A 3/9/2018    Procedure: Left ventriculography;  Surgeon: Vladislav Monsivais MD;  [facility];  Service: Cardiovascular   • CYSTOSCOPY LITHOLAPAXY BLADDER STONE EXTRACTION     • FRACTURE SURGERY Right 1995    right leg   • GASTRIC BANDING REMOVAL  08/2006   • HYSTERECTOMY  1984   • INTERVENTIONAL RADIOLOGY PROCEDURE Bilateral 3/9/2018    Procedure: Pulmonary Angiogram;  Surgeon: Vladislav Monsivais MD;  [facility];  Service: Cardiovascular   • LAPAROSCOPIC GASTRIC BANDING  08/2004   • THORACOSCOPY Left 6/15/2018    Procedure: BRONCHOSCOPY, LEFT VIDEO ASSISTED THORACOSCOPY WITH DAVINCI ROBOT WITH LIGATION AV MALFORMATION INTERCOSTAL NERVE BLOCK WITH CRYOPROBE;  Surgeon: Darryl Briseno III, MD;  Location: [facility] OR;  Service: [facility]       Family History   Problem Relation Age of Onset   • Melanoma Mother    • Breast cancer Mother    • Lung cancer Father    • Diabetes Father    • Heart disease Father    • Hypertension Sister    • Heart attack Son    • Heart block Son    • Malig Hyperthermia Neg Hx        Objective:  Physical Exam    General: No acute distress.  Eyes: conjunctiva clear; pupils equally round and reactive  ENT: external ears and nose atraumatic; oropharynx clear  CV: no peripheral edema  Resp: normal respiratory effort  Skin: no rashes or wounds; normal turgor  Psych: mood and affect appropriate; recent and remote memory intact    Vitals:    02/07/22 0810   Weight: 107 kg (235 lb)   Height: 162.6 cm (64\")         02/07/22  0810   Weight: 107 kg (235 lb)     Body mass index is 40.34 kg/m².      Left Hip Exam     Range of Motion   Abduction: 40   Adduction: 20   Extension: 0     Muscle Strength   Abduction: 4/5   Adduction: 4/5   Flexion: 4/5 "     Tests   LORI: positive  Aleksandr: positive  Fadir:  Positive FADIR test    Other   Erythema: absent  Sensation: normal  Pulse: present    Comments:  Negative logroll exam  Negative Stinchfield exam             Assessment:        1. Primary localized osteoarthritis of left hip    2. Greater trochanteric bursitis of left hip           Plan:  1.  Discussed plan of care with patient.  Given her significant symptom relief with prior corticosteroid injection of the greater trochanter as well as fluoroscopy guided injection to the hip can proceed in approximately 4 months if needed.  Discussed with patient she wishes to proceed with a fluoroscopy guided injection to the hip to call the clinic in place order and send for fluoroscopy guided injection however if she wants to proceed with corticosteroid injection of the greater trochanter plan to see her back in clinic.  Discussed treatment options including total hip arthroplasty.  She does not wish to proceed with total hip arthroplasty at this time given her significant symptom relief.  Agrees with plan of care.  Denies other concerns.  Orders:  No orders of the defined types were placed in this encounter.      Medications:  No orders of the defined types were placed in this encounter.      Followup:  No follow-ups on file.    Diagnoses and all orders for this visit:    1. Primary localized osteoarthritis of left hip (Primary)    2. Greater trochanteric bursitis of left hip          Dictated utilizing Dragon dictation

## 2022-03-09 ENCOUNTER — OFFICE VISIT (OUTPATIENT)
Dept: ORTHOPEDIC SURGERY | Facility: CLINIC | Age: 62
End: 2022-03-09

## 2022-03-09 VITALS — WEIGHT: 235 LBS | HEIGHT: 64 IN | BODY MASS INDEX: 40.12 KG/M2

## 2022-03-09 DIAGNOSIS — M16.12 PRIMARY LOCALIZED OSTEOARTHRITIS OF LEFT HIP: Primary | ICD-10-CM

## 2022-03-09 DIAGNOSIS — M70.62 GREATER TROCHANTERIC BURSITIS OF LEFT HIP: ICD-10-CM

## 2022-03-09 PROCEDURE — 20610 DRAIN/INJ JOINT/BURSA W/O US: CPT | Performed by: NURSE PRACTITIONER

## 2022-03-09 PROCEDURE — 99213 OFFICE O/P EST LOW 20 MIN: CPT | Performed by: NURSE PRACTITIONER

## 2022-03-09 RX ORDER — PREDNISONE 10 MG/1
TABLET ORAL
Qty: 39 TABLET | Refills: 0 | Status: SHIPPED | OUTPATIENT
Start: 2022-03-09 | End: 2022-06-16

## 2022-03-09 RX ADMIN — TRIAMCINOLONE ACETONIDE 40 MG: 40 INJECTION, SUSPENSION INTRA-ARTICULAR; INTRAMUSCULAR at 15:25

## 2022-03-09 RX ADMIN — LIDOCAINE HYDROCHLORIDE 4 ML: 10 INJECTION, SOLUTION EPIDURAL; INFILTRATION; INTRACAUDAL; PERINEURAL at 15:25

## 2022-03-09 NOTE — PROGRESS NOTES
Subjective:     Patient ID: Rylee Pennington is a 61 y.o. female.    Chief Complaint:  DJD left hip  Guided injection received 1/5/2022  History of Present Illness  Rylee Pennington presents today for a follow-up evaluation of her left hip. She received a corticosteroid injection in the lateral aspect of the greater trochanter last visit as well as fluoroscopy guided injection into the hip joint. Her pain has returned along the anterior aspect of the hip radiating to the groin and laterally over the left greater trochanter. She also has pain radiating from the groin down the anterior aspect of the thigh to her knee. She did not receive significant symptom relief with the fluoroscopy guided injection to the left hip. She has increased pain noted with transitional activity such as seated to standing, attempting to walk, from ambulation long distances, and standing for extended periods of time. The pain is present with internal and external rotation, adduction and abduction exercises to the left lower extremity. She denies any numbness or tingling radiating down the left lower extremity. Her discomfort at 8 to 9 out of 10 that is aching and throbbing in nature. She is not experiencing pain in the right lower extremity. This pain is now interfering with her daily activities. Standing at work is difficult secondary to the pain at the left hip radiating to the groin. She denies any other concerns at this time.       Social History     Occupational History   • Not on file   Tobacco Use   • Smoking status: Never Smoker   • Smokeless tobacco: Never Used   Vaping Use   • Vaping Use: Never used   Substance and Sexual Activity   • Alcohol use: No     Comment: daily caffiene, 1 energy drink    • Drug use: No   • Sexual activity: Defer      Past Medical History:   Diagnosis Date   • Anxiety     TOOK MEDS IN THE PAST-NONE LAST TWO YRS   • AVM (arteriovenous malformation)    • Fracture, fibula    • Fracture, tibia and fibula    •  History of kidney stones    • Hypertension    • Shortness of breath on exertion    • Sleep apnea     NO C-PAP IN USE     Past Surgical History:   Procedure Laterality Date   • APPENDECTOMY     • CARDIAC CATHETERIZATION N/A 3/9/2018    Procedure: Right Heart Cath;  Surgeon: Vladislav Monsivais MD;  Location: [facility] INVASIVE LOCATION;  Service: Cardiovascular   • CARDIAC CATHETERIZATION N/A 3/9/2018    Procedure: Left Heart Cath;  Surgeon: Vladislav Monsivais MD;  Location: [facility] CATH INVASIVE LOCATION;  Service: Cardiovascular   • CARDIAC CATHETERIZATION N/A 3/9/2018    Procedure: Coronary angiography;  Surgeon: Vladislav Monsivais MD;  Location: [facility] CATH INVASIVE LOCATION;  Service: Cardiovascular   • CARDIAC CATHETERIZATION N/A 3/9/2018    Procedure: Left ventriculography;  Surgeon: Vladislav Monsivais MD;  Location: [facility] INVASIVE LOCATION;  Service: Cardiovascular   • CYSTOSCOPY LITHOLAPAXY BLADDER STONE EXTRACTION     • FRACTURE SURGERY Right 1995    right leg   • GASTRIC BANDING REMOVAL  08/2006   • HYSTERECTOMY  1984   • INTERVENTIONAL RADIOLOGY PROCEDURE Bilateral 3/9/2018    Procedure: Pulmonary Angiogram;  Surgeon: Vladislav Monsivais MD;  Location: [facility] INVASIVE LOCATION;  Service: Cardiovascular   • LAPAROSCOPIC GASTRIC BANDING  08/2004   • THORACOSCOPY Left 6/15/2018    Procedure: BRONCHOSCOPY, LEFT VIDEO ASSISTED THORACOSCOPY WITH DAVINCI ROBOT WITH LIGATION AV MALFORMATION INTERCOSTAL NERVE BLOCK WITH CRYOPROBE;  Surgeon: Darryl Briseno III, MD;  Location: [facility];  Service: [facility]       Family History   Problem Relation Age of Onset   • Melanoma Mother    • Breast cancer Mother    • Lung cancer Father    • Diabetes Father    • Heart disease Father    • Hypertension Sister    • Heart attack Son    • Heart block Son    • Malig Hyperthermia Neg Hx          Objective:  Physical Exam    Vital signs reviewed.   General: No acute distress.  Eyes: conjunctiva clear; pupils equally round and reactive  ENT: external ears  "and nose atraumatic; oropharynx clear  CV: no peripheral edema  Resp: normal respiratory effort  Skin: no rashes or wounds; normal turgor  Psych: mood and affect appropriate; recent and remote memory intact    Vitals:    03/09/22 1500   Weight: 107 kg (235 lb)   Height: 162.6 cm (64\")         03/09/22  1500   Weight: 107 kg (235 lb)     Body mass index is 40.34 kg/m².      Ortho Exam     Left Hip Exam      Range of Motion   Abduction: 40   Adduction: 20   Extension: 0      Muscle Strength   Abduction: 4/5   Adduction: 4/5   Flexion: 4/5      Tests   LORI: positive  Aleksandr: positive  Fadir:  Positive FADIR test     Other   Erythema: absent  Sensation: normal  Pulse: present     Comments:    Positive logroll exam  Positive Stinchfield exam  Positive sensation light touch of distributions of the left lower extremity    Assessment:        1. Primary localized osteoarthritis of left hip           Plan:  Large Joint Arthrocentesis: L greater trochanteric bursa  Date/Time: 3/9/2022 3:25 PM  Consent given by: patient  Site marked: site marked  Timeout: Immediately prior to procedure a time out was called to verify the correct patient, procedure, equipment, support staff and site/side marked as required   Supporting Documentation  Indications: pain   Procedure Details  Location: hip - L greater trochanteric bursa  Preparation: Patient was prepped and draped in the usual sterile fashion  Needle size: 22 G  Approach: posterior  Medications administered: 4 mL lidocaine PF 1% 1 %; 40 mg triamcinolone acetonide 40 MG/ML  Patient tolerance: patient tolerated the procedure well with no immediate complications          1. I discussed the plan of care with the patient. We discussed treatment options including repeat fluoroscopy guided injection in approximately 1 month, however, given the little relief that she received we also discussed treatment options including total hip arthroplasty. At this time, she wishes to proceed with the " discussion for total hip arthroplasty. We will have her follow up with Dr. Reg Parisi at [facility] for further discussion of surgical intervention. Images are available for reviewing in her chart. She would like to proceed with corticosteroid injection at the lateral aspect of the hip over the greater trochanter as this has provided her with significant symptom relief in the past. We also discussed the increased risk of infection with any steroid injections in the hip. We will proceed with oral steroids at this time. She verbalized understanding of all the information and agreed with the plan of care. She denies any other concerns present at this time.  Orders:  Orders Placed This Encounter   Procedures   • Large Joint Arthrocentesis: L hip joint   • Ambulatory Referral to Orthopedic Surgery     New Medications Ordered This Visit   Medications   • predniSONE (DELTASONE) 10 MG tablet     Si mg daily x 3 days, 40 mg daily x 3 days, 20 mg daily x 3 days, 10 mg daily x 3 days     Dispense:  39 tablet     Refill:  0           I ordered and reviewed the LILY today.       Transcribed from ambient dictation for MATHEW Alvarez by Kim Alexis.  03/10/22   09:44 EST    Patient verbalized consent to the visit recording.  I have personally performed the services described in this document as transcribed by the above individual, and it is both accurate and complete.  MATHEW Alvarez  3/11/2022  08:42 EST

## 2022-03-11 ENCOUNTER — HOSPITAL ENCOUNTER (EMERGENCY)
Facility: HOSPITAL | Age: 62
Discharge: HOME | End: 2022-03-11
Attending: EMERGENCY MEDICINE | Admitting: EMERGENCY MEDICINE

## 2022-03-11 VITALS
WEIGHT: 235 LBS | BODY MASS INDEX: 40.12 KG/M2 | RESPIRATION RATE: 16 BRPM | HEIGHT: 64 IN | OXYGEN SATURATION: 95 % | DIASTOLIC BLOOD PRESSURE: 91 MMHG | TEMPERATURE: 98.8 F | SYSTOLIC BLOOD PRESSURE: 186 MMHG | HEART RATE: 80 BPM

## 2022-03-11 DIAGNOSIS — S81.812A LACERATION OF LEFT LOWER LEG, INITIAL ENCOUNTER: Primary | ICD-10-CM

## 2022-03-11 DIAGNOSIS — R03.0 ELEVATED BLOOD PRESSURE READING: ICD-10-CM

## 2022-03-11 PROCEDURE — 99282 EMERGENCY DEPT VISIT SF MDM: CPT

## 2022-03-11 PROCEDURE — 90471 IMMUNIZATION ADMIN: CPT | Performed by: PHYSICIAN ASSISTANT

## 2022-03-11 PROCEDURE — 25010000002 TETANUS-DIPHTH-ACELL PERTUSSIS 5-2.5-18.5 LF-MCG/0.5 SUSPENSION PREFILLED SYRINGE: Performed by: PHYSICIAN ASSISTANT

## 2022-03-11 PROCEDURE — 90715 TDAP VACCINE 7 YRS/> IM: CPT | Performed by: PHYSICIAN ASSISTANT

## 2022-03-11 RX ORDER — LIDOCAINE HYDROCHLORIDE AND EPINEPHRINE 20; 5 MG/ML; UG/ML
INJECTION, SOLUTION EPIDURAL; INFILTRATION; INTRACAUDAL; PERINEURAL
Status: COMPLETED
Start: 2022-03-11 | End: 2022-03-11

## 2022-03-11 RX ORDER — LIDOCAINE HYDROCHLORIDE 10 MG/ML
4 INJECTION, SOLUTION EPIDURAL; INFILTRATION; INTRACAUDAL; PERINEURAL
Status: COMPLETED | OUTPATIENT
Start: 2022-03-09 | End: 2022-03-09

## 2022-03-11 RX ORDER — TRIAMCINOLONE ACETONIDE 40 MG/ML
40 INJECTION, SUSPENSION INTRA-ARTICULAR; INTRAMUSCULAR
Status: COMPLETED | OUTPATIENT
Start: 2022-03-09 | End: 2022-03-09

## 2022-03-11 RX ORDER — LIDOCAINE HYDROCHLORIDE AND EPINEPHRINE BITARTRATE 20; .01 MG/ML; MG/ML
10 INJECTION, SOLUTION SUBCUTANEOUS ONCE
Status: DISCONTINUED | OUTPATIENT
Start: 2022-03-11 | End: 2022-03-11 | Stop reason: HOSPADM

## 2022-03-11 RX ADMIN — TETANUS TOXOID, REDUCED DIPHTHERIA TOXOID AND ACELLULAR PERTUSSIS VACCINE, ADSORBED 0.5 ML: 5; 2.5; 8; 8; 2.5 SUSPENSION INTRAMUSCULAR at 11:43

## 2022-03-11 RX ADMIN — LIDOCAINE HYDROCHLORIDE,EPINEPHRINE BITARTRATE 20 ML: 20; .005 INJECTION, SOLUTION EPIDURAL; INFILTRATION; INTRACAUDAL; PERINEURAL at 11:45

## 2022-03-11 NOTE — ED NOTES
Pt reports cutting her LLE on a wooden drawer while at work. Bleeding is controlled at this time, pt denies blood thinners.

## 2022-03-11 NOTE — ED PROVIDER NOTES
EMERGENCY DEPARTMENT ENCOUNTER      Room Number: 12/12    History is provided by the patient, no translation services needed    HPI:    Chief complaint: Laceration    Location: Left lower leg    Quality/Severity: Aching/410    Timing/Duration: Prior to arrival    Modifying Factors: Worse with palpation    Associated Symptoms: Bleeding    Narrative: Pt is a 61 y.o. female who presents complaining of laceration to her left lower leg sustained prior to arrival.  Patient that she hit her leg on a door of a cabinet.  Patient states that she is unsure of her last tetanus.  Patient is that she is a history of hypertension but is currently not taking medications.  Patient denies any chest pain, shortness of breath or dizziness.      PMD: Ant Downing MD    REVIEW OF SYSTEMS  Review of Systems   Constitutional: Negative for chills and fever.   Eyes: Negative for pain and visual disturbance.   Respiratory: Negative for cough and shortness of breath.    Cardiovascular: Negative for chest pain and leg swelling.   Gastrointestinal: Negative for abdominal pain, constipation, diarrhea, nausea and vomiting.   Genitourinary: Negative for dysuria and flank pain.   Musculoskeletal: Negative for arthralgias and myalgias.   Skin: Positive for wound (laceration to left leg). Negative for rash.   Neurological: Negative for dizziness, syncope and headaches.   Psychiatric/Behavioral: Negative for suicidal ideas. The patient is not nervous/anxious.          PAST MEDICAL HISTORY  Active Ambulatory Problems     Diagnosis Date Noted   • Arteriovenous malformation (AVM) 01/30/2018   • BASILIO (dyspnea on exertion) 03/07/2018   • Arterio-venous malformation 06/15/2018   • Primary localized osteoarthritis of left hip 12/27/2021   • Greater trochanteric bursitis of left hip 12/27/2021     Resolved Ambulatory Problems     Diagnosis Date Noted   • No Resolved Ambulatory Problems     Past Medical History:   Diagnosis Date   • Anxiety    • AVM  (arteriovenous malformation)    • Fracture, fibula    • Fracture, tibia and fibula    • History of kidney stones    • Hypertension    • Shortness of breath on exertion    • Sleep apnea        PAST SURGICAL HISTORY  Past Surgical History:   Procedure Laterality Date   • APPENDECTOMY     • CARDIAC CATHETERIZATION N/A 3/9/2018    Procedure: Right Heart Cath;  Surgeon: Vladislav Monsivais MD;  Location: [facility] INVASIVE LOCATION;  Service: Cardiovascular   • CARDIAC CATHETERIZATION N/A 3/9/2018    Procedure: Left Heart Cath;  Surgeon: Vladislav Monsivais MD;  Location: [facility] CATH INVASIVE LOCATION;  Service: Cardiovascular   • CARDIAC CATHETERIZATION N/A 3/9/2018    Procedure: Coronary angiography;  Surgeon: Vladislav Monsivais MD;  Location: [facility] INVASIVE LOCATION;  Service: Cardiovascular   • CARDIAC CATHETERIZATION N/A 3/9/2018    Procedure: Left ventriculography;  Surgeon: Vladislav Monsivais MD;  Location: [facility] INVASIVE LOCATION;  Service: Cardiovascular   • CYSTOSCOPY LITHOLAPAXY BLADDER STONE EXTRACTION     • FRACTURE SURGERY Right 1995    right leg   • GASTRIC BANDING REMOVAL  08/2006   • HYSTERECTOMY  1984   • INTERVENTIONAL RADIOLOGY PROCEDURE Bilateral 3/9/2018    Procedure: Pulmonary Angiogram;  Surgeon: Vladislav Monsivais MD;  Location: [facility] INVASIVE LOCATION;  Service: Cardiovascular   • LAPAROSCOPIC GASTRIC BANDING  08/2004   • THORACOSCOPY Left 6/15/2018    Procedure: BRONCHOSCOPY, LEFT VIDEO ASSISTED THORACOSCOPY WITH DAVINCI ROBOT WITH LIGATION AV MALFORMATION INTERCOSTAL NERVE BLOCK WITH CRYOPROBE;  Surgeon: Darryl Briseno III, MD;  Location: [facility];  [facility]       FAMILY HISTORY  Family History   Problem Relation Age of Onset   • Melanoma Mother    • Breast cancer Mother    • Lung cancer Father    • Diabetes Father    • Heart disease Father    • Hypertension Sister    • Heart attack Son    • Heart block Son    • Malig Hyperthermia Neg Hx        SOCIAL HISTORY  Social History     Socioeconomic  History   • Marital status:    Tobacco Use   • Smoking status: Never Smoker   • Smokeless tobacco: Never Used   Vaping Use   • Vaping Use: Never used   Substance and Sexual Activity   • Alcohol use: No     Comment: daily caffiene, 1 energy drink    • Drug use: No   • Sexual activity: Defer       ALLERGIES  Patient has no known allergies.      Current Facility-Administered Medications:   •  lidocaine-EPINEPHrine (XYLOCAINE W/EPI) 2 %-1:[id number] injection 10 mL, 10 mL, Injection, Once, Jeffery Llanos PA-C    Current Outpatient Medications:   •  ibuprofen (ADVIL,MOTRIN) 800 MG tablet, Take 800 mg by mouth Every 6 (Six) Hours As Needed for Mild Pain ., Disp: , Rfl:   •  predniSONE (DELTASONE) 10 MG tablet, 60 mg daily x 3 days, 40 mg daily x 3 days, 20 mg daily x 3 days, 10 mg daily x 3 days, Disp: 39 tablet, Rfl: 0    PHYSICAL EXAM  ED Triage Vitals   Temp Pulse Resp BP SpO2   -- -- -- -- --      Temp src Heart Rate Source Patient Position BP Location FiO2 (%)   -- -- -- -- --       Physical Exam  Vitals and nursing note reviewed.   HENT:      Head: Normocephalic and atraumatic.   Eyes:      Conjunctiva/sclera: Conjunctivae normal.   Cardiovascular:      Rate and Rhythm: Normal rate and regular rhythm.      Heart sounds: Normal heart sounds.   Pulmonary:      Effort: Pulmonary effort is normal. No respiratory distress.      Breath sounds: Normal breath sounds.   Abdominal:      General: Bowel sounds are normal. There is no distension.      Palpations: Abdomen is soft.      Tenderness: There is no abdominal tenderness.   Musculoskeletal:         General: Normal range of motion.      Cervical back: Normal range of motion and neck supple.   Skin:     General: Skin is warm and dry.      Findings: Laceration present.          Neurological:      Mental Status: She is alert and oriented to person, place, and time.   Psychiatric:         Mood and Affect: Mood and affect normal.           LAB RESULTS  Lab Results  (last 24 hours)     ** No results found for the last 24 hours. **                RADIOLOGY  No Radiology Exams Resulted Within Past 24 Hours        PROCEDURES  Procedures      PROGRESS AND CONSULTS  ED Course as of 03/11/22 1233   Fri Mar 11, 2022   1127 Laceration repair:  Discussed risks to include bleeding, infection, further tissue damage, benefits to include improved wound healing, and alternatives to include no closure, altered closure techniques with patient/parents of the patient/guardian.  Expressed verbal consent for procedure.  3 cm laceration located left lower leg.  Wound is anesthetized with 2% Lidocaine with epinephrine, cleansed with chlorhexidine, and irrigated copiously.  Wound explored.  Simple single layer laceration closed with 4-0 Ethilon in an interrupted fashion requiring 3 sutures.  Well-tolerated.  No immediate complications identified.  Reviewed wound care, follow-up for suture removal, and red flags which would indicate need for reevaluation of the wound.       [GT]      ED Course User Index  [GT] Jeffery Llanos, RICARDO           MEDICAL DECISION MAKING    MDM       DIAGNOSIS  Final diagnoses:   Laceration of left lower leg, initial encounter   Elevated blood pressure reading       Latest Documented Vital Signs:  As of 12:33 EST  BP- (!) 186/91 HR- 80 Temp- 98.8 °F (37.1 °C) (Oral) O2 sat- 95%    DISPOSITION  Discharged home        Discussed pertinent findings with the patient/family.  Patient/Family voiced understanding of need to follow-up for recheck and further testing as needed.  Return to the Emergency Department warnings were given.         Medication List      No changes were made to your prescriptions during this visit.              Follow-up Information     Ant Downing MD. Call in 1 day.    Specialty: Family Medicine  Why: To schedule a follow up appointment, For suture removal in 14 days, follow with blood pressure  Contact information:  [address] [place] [address]  [id number]  [phone]                           Dictated utilizing Dragon dictation     Jeffery Llanos PA-C  03/11/22 1237

## 2022-03-17 ENCOUNTER — OFFICE VISIT (OUTPATIENT)
Dept: ORTHOPEDIC SURGERY | Facility: CLINIC | Age: 62
End: 2022-03-17

## 2022-03-17 VITALS — BODY MASS INDEX: 43.21 KG/M2 | HEIGHT: 64 IN | WEIGHT: 253.1 LBS

## 2022-03-17 DIAGNOSIS — M25.551 BILATERAL HIP PAIN: Primary | ICD-10-CM

## 2022-03-17 DIAGNOSIS — M25.552 BILATERAL HIP PAIN: Primary | ICD-10-CM

## 2022-03-17 DIAGNOSIS — M16.12 PRIMARY OSTEOARTHRITIS OF LEFT HIP: ICD-10-CM

## 2022-03-17 PROCEDURE — 99214 OFFICE O/P EST MOD 30 MIN: CPT | Performed by: NURSE PRACTITIONER

## 2022-03-17 PROCEDURE — 73521 X-RAY EXAM HIPS BI 2 VIEWS: CPT | Performed by: NURSE PRACTITIONER

## 2022-03-17 RX ORDER — CHLORHEXIDINE GLUCONATE 500 MG/1
CLOTH TOPICAL 2 TIMES DAILY
Status: CANCELLED | OUTPATIENT
Start: 2022-03-17

## 2022-03-17 RX ORDER — POVIDONE-IODINE 10 MG/ML
SOLUTION TOPICAL ONCE
Status: CANCELLED | OUTPATIENT
Start: 2022-06-20 | End: 2022-03-17

## 2022-03-17 RX ORDER — CEFAZOLIN SODIUM 2 G/100ML
2 INJECTION, SOLUTION INTRAVENOUS ONCE
Status: CANCELLED | OUTPATIENT
Start: 2022-06-20 | End: 2022-03-17

## 2022-06-16 ENCOUNTER — TELEPHONE (OUTPATIENT)
Dept: ORTHOPEDIC SURGERY | Facility: CLINIC | Age: 62
End: 2022-06-16

## 2022-06-16 ENCOUNTER — OFFICE VISIT (OUTPATIENT)
Dept: ORTHOPEDIC SURGERY | Facility: CLINIC | Age: 62
End: 2022-06-16

## 2022-06-16 ENCOUNTER — PRE-ADMISSION TESTING (OUTPATIENT)
Dept: PREADMISSION TESTING | Facility: HOSPITAL | Age: 62
End: 2022-06-16

## 2022-06-16 VITALS
DIASTOLIC BLOOD PRESSURE: 90 MMHG | BODY MASS INDEX: 41.15 KG/M2 | WEIGHT: 247 LBS | HEIGHT: 65 IN | SYSTOLIC BLOOD PRESSURE: 140 MMHG | TEMPERATURE: 97.5 F

## 2022-06-16 VITALS
TEMPERATURE: 97.9 F | DIASTOLIC BLOOD PRESSURE: 79 MMHG | HEART RATE: 84 BPM | OXYGEN SATURATION: 96 % | RESPIRATION RATE: 16 BRPM | SYSTOLIC BLOOD PRESSURE: 155 MMHG | BODY MASS INDEX: 41.15 KG/M2 | HEIGHT: 65 IN | WEIGHT: 247 LBS

## 2022-06-16 DIAGNOSIS — M16.12 PRIMARY OSTEOARTHRITIS OF LEFT HIP: Primary | ICD-10-CM

## 2022-06-16 DIAGNOSIS — M16.12 PRIMARY OSTEOARTHRITIS OF LEFT HIP: ICD-10-CM

## 2022-06-16 LAB
ANION GAP SERPL CALCULATED.3IONS-SCNC: 13 MMOL/L (ref 5–15)
BUN SERPL-MCNC: 14 MG/DL (ref 8–23)
BUN/CREAT SERPL: 22.2 (ref 7–25)
CALCIUM SPEC-SCNC: 9.2 MG/DL (ref 8.6–10.5)
CHLORIDE SERPL-SCNC: 105 MMOL/L (ref 98–107)
CO2 SERPL-SCNC: 24 MMOL/L (ref 22–29)
CREAT SERPL-MCNC: 0.63 MG/DL (ref 0.57–1)
DEPRECATED RDW RBC AUTO: 45.1 FL (ref 37–54)
EGFRCR SERPLBLD CKD-EPI 2021: 99.8 ML/MIN/1.73
ERYTHROCYTE [DISTWIDTH] IN BLOOD BY AUTOMATED COUNT: 13.5 % (ref 12.3–15.4)
GLUCOSE SERPL-MCNC: 91 MG/DL (ref 65–99)
HCT VFR BLD AUTO: 40 % (ref 34–46.6)
HGB BLD-MCNC: 13.3 G/DL (ref 12–15.9)
MCH RBC QN AUTO: 30.2 PG (ref 26.6–33)
MCHC RBC AUTO-ENTMCNC: 33.3 G/DL (ref 31.5–35.7)
MCV RBC AUTO: 90.9 FL (ref 79–97)
PLATELET # BLD AUTO: 216 10*3/MM3 (ref 140–450)
PMV BLD AUTO: 9.9 FL (ref 6–12)
POTASSIUM SERPL-SCNC: 3.8 MMOL/L (ref 3.5–5.2)
QT INTERVAL: 399 MS
RBC # BLD AUTO: 4.4 10*6/MM3 (ref 3.77–5.28)
SODIUM SERPL-SCNC: 142 MMOL/L (ref 136–145)
WBC NRBC COR # BLD: 6.23 10*3/MM3 (ref 3.4–10.8)

## 2022-06-16 PROCEDURE — 85027 COMPLETE CBC AUTOMATED: CPT

## 2022-06-16 PROCEDURE — 93010 ELECTROCARDIOGRAM REPORT: CPT | Performed by: INTERNAL MEDICINE

## 2022-06-16 PROCEDURE — 80048 BASIC METABOLIC PNL TOTAL CA: CPT

## 2022-06-16 PROCEDURE — 93005 ELECTROCARDIOGRAM TRACING: CPT

## 2022-06-16 PROCEDURE — 36415 COLL VENOUS BLD VENIPUNCTURE: CPT

## 2022-06-16 RX ORDER — CHLORHEXIDINE GLUCONATE 500 MG/1
CLOTH TOPICAL 2 TIMES DAILY
Status: ACTIVE | OUTPATIENT
Start: 2022-06-16

## 2022-06-16 ASSESSMENT — HOOS JR
HOOS JR SCORE: 39.902
HOOS JR SCORE: 16

## 2022-06-16 NOTE — PROGRESS NOTES
Patient was here today for H&P visit for left total hip replacement, unfortunately she has a large wound noted left lower extremity that is been there for several weeks, she saw her medical doctor yesterday and noted to send her to the wound care specialist.  For that reason we will need to hold off on surgery, surgery canceled at this time and once that is completely healed she will let us know we will go ahead and get her rescheduled

## 2022-06-16 NOTE — TELEPHONE ENCOUNTER
Hub staff attempted to follow warm transfer process and was unsuccessful.     Caller: Rylee Pennington    Relationship to patient: Self    Best call back number: [phone]    Patient is needing: PT HAS A 1:10PM PRE-OP APPT W/ KARLEE BETHEA. SHE WAS SCHEDULED FOR PRE-OP TESTING AT THE HOSPITAL AT 11:30. PT CALLED AT 11:57 CONCERNED THAT SHE WAS GOING TO BE LATE FOR HER APPT. THEY HAD NOT STARTED HER PRE-OP TESTING AT THE HOSPITAL.

## 2022-06-16 NOTE — DISCHARGE INSTRUCTIONS
Take the following medications the morning of surgery:    NONE    TIME OF ARRIVAL WILL BE GIVEN TO YOU BY DR WOOD'S OFFICE     If you are on prescription narcotic pain medication to control your pain you may also take that medication the morning of surgery.    General Instructions:  Do not eat solid food after midnight the night before surgery.  You may drink clear liquids day of surgery but must stop at least one hour before your hospital arrival time.  It is beneficial for you to have a clear drink that contains carbohydrates the day of surgery.  We suggest a 12 to 20 ounce bottle of Gatorade or Powerade for non-diabetic patients or a 12 to 20 ounce bottle of G2 or Powerade Zero for diabetic patients. (Pediatric patients, are not advised to drink a 12 to 20 ounce carbohydrate drink)    Clear liquids are liquids you can see through.  Nothing red in color.     Plain water                               Sports drinks  Sodas                                   Gelatin (Jell-O)  Fruit juices without pulp such as white grape juice and apple juice  Popsicles that contain no fruit or yogurt  Tea or coffee (no cream or milk added)  Gatorade / Powerade  G2 / Powerade Zero            Patients who avoid smoking, chewing tobacco and alcohol for 4 weeks prior to surgery have a reduced risk of post-operative complications.  Quit smoking as many days before surgery as you can.  Do not smoke, use chewing tobacco or drink alcohol the day of surgery.   If applicable bring your C-PAP/ BI-PAP machine.  Bring any papers given to you in the doctor’s office.  Wear clean comfortable clothes.  Do not wear contact lenses, false eyelashes or make-up.  Bring a case for your glasses.   Bring crutches or walker if applicable.  Remove all piercings.  Leave jewelry and any other valuables at home.  Hair extensions with metal clips must be removed prior to surgery.  The Pre-Admission Testing nurse will instruct you to bring medications if unable to  obtain an accurate list in Pre-Admission Testing.          .    Preventing a Surgical Site Infection:  For 2 to 3 days before surgery, avoid shaving with a razor because the razor can irritate skin and make it easier to develop an infection.    Any areas of open skin can increase the risk of a post-operative wound infection by allowing bacteria to enter and travel throughout the body.  Notify your surgeon if you have any skin wounds / rashes even if it is not near the expected surgical site.  The area will need assessed to determine if surgery should be delayed until it is healed.  The night prior to surgery shower using a fresh bar of anti-bacterial soap (such as Dial) and clean washcloth.  Sleep in a clean bed with clean clothing.  Do not allow pets to sleep with you.  Shower on the morning of surgery using a fresh bar of anti-bacterial soap (such as Dial) and clean washcloth.  Dry with a clean towel and dress in clean clothing.  Ask your surgeon if you will be receiving antibiotics prior to surgery.  Make sure you, your family, and all healthcare providers clean their hands with soap and water or an alcohol based hand  before caring for you or your wound.    Day of surgery:  Your arrival time is approximately two hours before your scheduled surgery time.  Upon arrival, a Pre-op nurse and Anesthesiologist will review your health history, obtain vital signs, and answer questions you may have.  The only belongings needed at this time will be a list of your home medications and if applicable your C-PAP/BI-PAP machine.  A Pre-op nurse will start an IV and you may receive medication in preparation for surgery, including something to help you relax.     Please be aware that surgery does come with discomfort.  We want to make every effort to control your discomfort so please discuss any uncontrolled symptoms with your nurse.   Your doctor will most likely have prescribed pain medications.      If you are going  home after surgery you will receive individualized written care instructions before being discharged.  A responsible adult must drive you to and from the hospital on the day of your surgery and stay with you for 24 hours.  Discharge prescriptions can be filled by the hospital pharmacy during regular pharmacy hours.  If you are having surgery late in the day/evening your prescription may be e-prescribed to your pharmacy.  Please verify your pharmacy hours or chose a 24 hour pharmacy to avoid not having access to your prescription because your pharmacy has closed for the day.    If you are staying overnight following surgery, you will be transported to your hospital room following the recovery period.  [facility] has all private rooms.    If you have any questions please call Pre-Admission Testing at [phone].  Deductibles and co-payments are collected on the day of service. Please be prepared to pay the required co-pay, deductible or deposit on the day of service as defined by your plan.    Patient Education for Self-Quarantine Process    Following your COVID testing, we strongly recommend that you wear a mask when you are with other people and practice social distancing.   Limit your activities to only required outings.  Wash your hands with soap and water frequently for at least 20 seconds.   Avoid touching your eyes, nose and mouth with unwashed hands.  Do not share anything - utensils, drinking glasses, food from the same bowl.   Sanitize household surfaces daily. Include all high touch areas (door handles, light switches, phones, countertops, etc.)    Call your surgeon immediately if you experience any of the following symptoms:  Sore Throat  Shortness of Breath or difficulty breathing  Cough  Chills  Body soreness or muscle pain  Headache  Fever  New loss of taste or smell  Do not arrive for your surgery ill.  Your procedure will need to be rescheduled to another time.  You will need to call  your physician before the day of surgery to avoid any unnecessary exposure to hospital staff as well as other patients.       CHLORHEXIDINE CLOTH INSTRUCTIONS  The morning of surgery follow these instructions using the Chlorhexidine cloths you've been given.  These steps reduce bacteria on the body.  Do not use the cloths near your eyes, ears mouth, genitalia or on open wounds.  Throw the cloths away after use but do not try to flush them down a toilet.      Open and remove one cloth at a time from the package.    Leave the cloth unfolded and begin the bathing.  Massage the skin with the cloths using gentle pressure to remove bacteria.  Do not scrub harshly.   Follow the steps below with one 2% CHG cloth per area (6 total cloths).  One cloth for neck, shoulders and chest.  One cloth for both arms, hands, fingers and underarms (do underarms last).  One cloth for the abdomen followed by groin.  One cloth for right leg and foot including between the toes.  One cloth for left leg and foot including between the toes.  The last cloth is to be used for the back of the neck, back and buttocks.    Allow the CHG to air dry 3 minutes on the skin which will give it time to work and decrease the chance of irritation.  The skin may feel sticky until it is dry.  Do not rinse with water or any other liquid or you will lose the beneficial effects of the CHG.  If mild skin irritation occurs, do rinse the skin to remove the CHG.  Report this to the nurse at time of admission.  Do not apply lotions, creams, ointments, deodorants or perfumes after using the clothes. Dress in clean clothes before coming to the hospital.

## 2022-06-17 ENCOUNTER — APPOINTMENT (OUTPATIENT)
Dept: LAB | Facility: HOSPITAL | Age: 62
End: 2022-06-17

## 2022-06-22 ENCOUNTER — LAB (OUTPATIENT)
Dept: LAB | Facility: HOSPITAL | Age: 62
End: 2022-06-22

## 2022-06-22 ENCOUNTER — APPOINTMENT (OUTPATIENT)
Dept: WOUND CARE | Facility: HOSPITAL | Age: 62
End: 2022-06-22

## 2022-06-22 ENCOUNTER — TRANSCRIBE ORDERS (OUTPATIENT)
Dept: ADMINISTRATIVE | Facility: HOSPITAL | Age: 62
End: 2022-06-22

## 2022-06-22 ENCOUNTER — LAB REQUISITION (OUTPATIENT)
Dept: LAB | Facility: HOSPITAL | Age: 62
End: 2022-06-22

## 2022-06-22 DIAGNOSIS — L97.822 SKIN ULCER OF POPLITEAL REGION, LEFT, WITH FAT LAYER EXPOSED: Primary | ICD-10-CM

## 2022-06-22 DIAGNOSIS — L97.822 SKIN ULCER OF POPLITEAL REGION, LEFT, WITH FAT LAYER EXPOSED: ICD-10-CM

## 2022-06-22 DIAGNOSIS — I87.312 CHRONIC VENOUS HYPERTENSION (IDIOPATHIC) WITH ULCER OF LEFT LOWER EXTREMITY (CODE): ICD-10-CM

## 2022-06-22 DIAGNOSIS — L97.822 NON-PRESSURE CHRONIC ULCER OF OTHER PART OF LEFT LOWER LEG WITH FAT LAYER EXPOSED: ICD-10-CM

## 2022-06-22 DIAGNOSIS — S81.812A LACERATION WITHOUT FOREIGN BODY, LEFT LOWER LEG, INITIAL ENCOUNTER: ICD-10-CM

## 2022-06-22 LAB
CRP SERPL-MCNC: 0.71 MG/DL (ref 0–0.5)
ERYTHROCYTE [SEDIMENTATION RATE] IN BLOOD: 35 MM/HR (ref 0–30)
HBA1C MFR BLD: 5.1 % (ref 4.8–5.6)
PREALB SERPL-MCNC: 22.8 MG/DL (ref 20–40)

## 2022-06-22 PROCEDURE — 87205 SMEAR GRAM STAIN: CPT | Performed by: NURSE PRACTITIONER

## 2022-06-22 PROCEDURE — 36415 COLL VENOUS BLD VENIPUNCTURE: CPT

## 2022-06-22 PROCEDURE — 87070 CULTURE OTHR SPECIMN AEROBIC: CPT | Performed by: NURSE PRACTITIONER

## 2022-06-22 PROCEDURE — 86140 C-REACTIVE PROTEIN: CPT

## 2022-06-22 PROCEDURE — 87186 SC STD MICRODIL/AGAR DIL: CPT | Performed by: NURSE PRACTITIONER

## 2022-06-22 PROCEDURE — G0463 HOSPITAL OUTPT CLINIC VISIT: HCPCS

## 2022-06-22 PROCEDURE — 84134 ASSAY OF PREALBUMIN: CPT

## 2022-06-22 PROCEDURE — 85652 RBC SED RATE AUTOMATED: CPT

## 2022-06-22 PROCEDURE — 83036 HEMOGLOBIN GLYCOSYLATED A1C: CPT

## 2022-06-22 PROCEDURE — 87077 CULTURE AEROBIC IDENTIFY: CPT | Performed by: NURSE PRACTITIONER

## 2022-06-24 ENCOUNTER — APPOINTMENT (OUTPATIENT)
Dept: WOUND CARE | Facility: HOSPITAL | Age: 62
End: 2022-06-24

## 2022-06-25 LAB
BACTERIA SPEC AEROBE CULT: ABNORMAL
BACTERIA SPEC AEROBE CULT: ABNORMAL
GRAM STN SPEC: ABNORMAL
GRAM STN SPEC: ABNORMAL

## 2022-06-29 ENCOUNTER — APPOINTMENT (OUTPATIENT)
Dept: WOUND CARE | Facility: HOSPITAL | Age: 62
End: 2022-06-29

## 2022-07-06 ENCOUNTER — APPOINTMENT (OUTPATIENT)
Dept: WOUND CARE | Facility: HOSPITAL | Age: 62
End: 2022-07-06

## 2022-07-06 PROCEDURE — G0463 HOSPITAL OUTPT CLINIC VISIT: HCPCS

## 2022-09-01 ENCOUNTER — PRE-ADMISSION TESTING (OUTPATIENT)
Dept: PREADMISSION TESTING | Facility: HOSPITAL | Age: 62
End: 2022-09-01

## 2022-09-01 ENCOUNTER — OFFICE VISIT (OUTPATIENT)
Dept: ORTHOPEDIC SURGERY | Facility: CLINIC | Age: 62
End: 2022-09-01

## 2022-09-01 VITALS
WEIGHT: 251 LBS | HEIGHT: 64 IN | SYSTOLIC BLOOD PRESSURE: 138 MMHG | DIASTOLIC BLOOD PRESSURE: 82 MMHG | TEMPERATURE: 97.1 F | BODY MASS INDEX: 42.85 KG/M2

## 2022-09-01 VITALS
WEIGHT: 251 LBS | TEMPERATURE: 98.3 F | DIASTOLIC BLOOD PRESSURE: 90 MMHG | OXYGEN SATURATION: 98 % | HEIGHT: 64 IN | HEART RATE: 66 BPM | BODY MASS INDEX: 42.85 KG/M2 | RESPIRATION RATE: 18 BRPM | SYSTOLIC BLOOD PRESSURE: 175 MMHG

## 2022-09-01 DIAGNOSIS — M16.12 PRIMARY OSTEOARTHRITIS OF LEFT HIP: Primary | ICD-10-CM

## 2022-09-01 LAB
ANION GAP SERPL CALCULATED.3IONS-SCNC: 8.2 MMOL/L (ref 5–15)
BUN SERPL-MCNC: 13 MG/DL (ref 8–23)
BUN/CREAT SERPL: 21.3 (ref 7–25)
CALCIUM SPEC-SCNC: 9.8 MG/DL (ref 8.6–10.5)
CHLORIDE SERPL-SCNC: 105 MMOL/L (ref 98–107)
CO2 SERPL-SCNC: 28.8 MMOL/L (ref 22–29)
CREAT SERPL-MCNC: 0.61 MG/DL (ref 0.57–1)
DEPRECATED RDW RBC AUTO: 44.1 FL (ref 37–54)
EGFRCR SERPLBLD CKD-EPI 2021: 100.6 ML/MIN/1.73
ERYTHROCYTE [DISTWIDTH] IN BLOOD BY AUTOMATED COUNT: 13.2 % (ref 12.3–15.4)
GLUCOSE SERPL-MCNC: 79 MG/DL (ref 65–99)
HCT VFR BLD AUTO: 41.8 % (ref 34–46.6)
HGB BLD-MCNC: 13.5 G/DL (ref 12–15.9)
MCH RBC QN AUTO: 29.7 PG (ref 26.6–33)
MCHC RBC AUTO-ENTMCNC: 32.3 G/DL (ref 31.5–35.7)
MCV RBC AUTO: 91.9 FL (ref 79–97)
PLATELET # BLD AUTO: 224 10*3/MM3 (ref 140–450)
PMV BLD AUTO: 9.7 FL (ref 6–12)
POTASSIUM SERPL-SCNC: 4.3 MMOL/L (ref 3.5–5.2)
RBC # BLD AUTO: 4.55 10*6/MM3 (ref 3.77–5.28)
SODIUM SERPL-SCNC: 142 MMOL/L (ref 136–145)
WBC NRBC COR # BLD: 5.67 10*3/MM3 (ref 3.4–10.8)

## 2022-09-01 PROCEDURE — S0260 H&P FOR SURGERY: HCPCS | Performed by: NURSE PRACTITIONER

## 2022-09-01 PROCEDURE — 85027 COMPLETE CBC AUTOMATED: CPT

## 2022-09-01 PROCEDURE — 80048 BASIC METABOLIC PNL TOTAL CA: CPT

## 2022-09-01 PROCEDURE — 36415 COLL VENOUS BLD VENIPUNCTURE: CPT

## 2022-09-01 PROCEDURE — 73501 X-RAY EXAM HIP UNI 1 VIEW: CPT | Performed by: NURSE PRACTITIONER

## 2022-09-01 ASSESSMENT — HOOS JR
HOOS JR SCORE: 39.625
HOOS JR SCORE: 19

## 2022-09-01 NOTE — DISCHARGE INSTRUCTIONS
Take the following medications the morning of surgery: NONE     TO BE CALLED WITH ARRIVAL TIME    If you are on prescription narcotic pain medication to control your pain you may also take that medication the morning of surgery.    General Instructions:  Do not eat solid food after midnight the night before surgery.  You may drink clear liquids day of surgery but must stop at least one hour before your hospital arrival time.  It is beneficial for you to have a clear drink that contains carbohydrates the day of surgery.  We suggest a 12 to 20 ounce bottle of Gatorade or Powerade for non-diabetic patients or a 12 to 20 ounce bottle of G2 or Powerade Zero for diabetic patients. (Pediatric patients, are not advised to drink a 12 to 20 ounce carbohydrate drink)    Clear liquids are liquids you can see through.  Nothing red in color.     Plain water                               Sports drinks  Sodas                                   Gelatin (Jell-O)  Fruit juices without pulp such as white grape juice and apple juice  Popsicles that contain no fruit or yogurt  Tea or coffee (no cream or milk added)  Gatorade / Powerade  G2 / Powerade Zero    Infants may have breast milk up to four hours before surgery.  Infants drinking formula may drink formula up to six hours before surgery.   Patients who avoid smoking, chewing tobacco and alcohol for 4 weeks prior to surgery have a reduced risk of post-operative complications.  Quit smoking as many days before surgery as you can.  Do not smoke, use chewing tobacco or drink alcohol the day of surgery.   If applicable bring your C-PAP/ BI-PAP machine.  Bring any papers given to you in the doctor’s office.  Wear clean comfortable clothes.  Do not wear contact lenses, false eyelashes or make-up.  Bring a case for your glasses.   Bring crutches or walker if applicable.  Remove all piercings.  Leave jewelry and any other valuables at home.  Hair extensions with metal clips must be removed  prior to surgery.  The Pre-Admission Testing nurse will instruct you to bring medications if unable to obtain an accurate list in Pre-Admission Testing.        If you were given a blood bank ID arm band remember to bring it with you the day of surgery.    Preventing a Surgical Site Infection:  For 2 to 3 days before surgery, avoid shaving with a razor because the razor can irritate skin and make it easier to develop an infection.    Any areas of open skin can increase the risk of a post-operative wound infection by allowing bacteria to enter and travel throughout the body.  Notify your surgeon if you have any skin wounds / rashes even if it is not near the expected surgical site.  The area will need assessed to determine if surgery should be delayed until it is healed.  The night prior to surgery shower using a fresh bar of anti-bacterial soap (such as Dial) and clean washcloth.  Sleep in a clean bed with clean clothing.  Do not allow pets to sleep with you.  Shower on the morning of surgery using a fresh bar of anti-bacterial soap (such as Dial) and clean washcloth.  Dry with a clean towel and dress in clean clothing.  Ask your surgeon if you will be receiving antibiotics prior to surgery.  Make sure you, your family, and all healthcare providers clean their hands with soap and water or an alcohol based hand  before caring for you or your wound.    Day of surgery:  Your arrival time is approximately two hours before your scheduled surgery time.  Upon arrival, a Pre-op nurse and Anesthesiologist will review your health history, obtain vital signs, and answer questions you may have.  The only belongings needed at this time will be a list of your home medications and if applicable your C-PAP/BI-PAP machine.  A Pre-op nurse will start an IV and you may receive medication in preparation for surgery, including something to help you relax.     Please be aware that surgery does come with discomfort.  We want to  make every effort to control your discomfort so please discuss any uncontrolled symptoms with your nurse.   Your doctor will most likely have prescribed pain medications.      If you are going home after surgery you will receive individualized written care instructions before being discharged.  A responsible adult must drive you to and from the hospital on the day of your surgery and stay with you for 24 hours.  Discharge prescriptions can be filled by the hospital pharmacy during regular pharmacy hours.  If you are having surgery late in the day/evening your prescription may be e-prescribed to your pharmacy.  Please verify your pharmacy hours or chose a 24 hour pharmacy to avoid not having access to your prescription because your pharmacy has closed for the day.    If you are staying overnight following surgery, you will be transported to your hospital room following the recovery period.  [facility] has all private rooms.    If you have any questions please call Pre-Admission Testing at [phone].  Deductibles and co-payments are collected on the day of service. Please be prepared to pay the required co-pay, deductible or deposit on the day of service as defined by your plan.    Call your surgeon immediately if you experience any of the following symptoms:  Sore Throat  Shortness of Breath or difficulty breathing  Cough  Chills  Body soreness or muscle pain  Headache  Fever  New loss of taste or smell  Do not arrive for your surgery ill.  Your procedure will need to be rescheduled to another time.  You will need to call your physician before the day of surgery to avoid any unnecessary exposure to hospital staff as well as other patients.   CHLORHEXIDINE CLOTH INSTRUCTIONS  The morning of surgery follow these instructions using the Chlorhexidine cloths you've been given.  These steps reduce bacteria on the body.  Do not use the cloths near your eyes, ears mouth, genitalia or on open wounds.  Throw  the cloths away after use but do not try to flush them down a toilet.      Open and remove one cloth at a time from the package.    Leave the cloth unfolded and begin the bathing.  Massage the skin with the cloths using gentle pressure to remove bacteria.  Do not scrub harshly.   Follow the steps below with one 2% CHG cloth per area (6 total cloths).  One cloth for neck, shoulders and chest.  One cloth for both arms, hands, fingers and underarms (do underarms last).  One cloth for the abdomen followed by groin.  One cloth for right leg and foot including between the toes.  One cloth for left leg and foot including between the toes.  The last cloth is to be used for the back of the neck, back and buttocks.    Allow the CHG to air dry 3 minutes on the skin which will give it time to work and decrease the chance of irritation.  The skin may feel sticky until it is dry.  Do not rinse with water or any other liquid or you will lose the beneficial effects of the CHG.  If mild skin irritation occurs, do rinse the skin to remove the CHG.  Report this to the nurse at time of admission.  Do not apply lotions, creams, ointments, deodorants or perfumes after using the clothes. Dress in clean clothes before coming to the hospital.

## 2022-09-01 NOTE — H&P (VIEW-ONLY)
Patient: Rylee Pennington    Date of Admission: 9/12/2022    YOB: 1960    Medical Record Number: [id number]    Admitting Physician: Dr. Reg Parisi    Reason for Admission: End Stage Left Hip OA    History of Present Illness: 62 y.o. female presents with severe end stage hip osteoarthritis which has not been responsive to the full compliment of conservative measures. Despite conservative attempts, there is still severe, constant activity limiting hip pain. Given the severity of the pain, the patient has elected to proceed with hip replacement.    Allergies: No Known Allergies      Current Medications:  Home Medications:    Current Outpatient Medications on File Prior to Visit   Medication Sig   • Chlorhexidine Gluconate 2 % pads Apply  topically Take As Directed. As directed pre op     Current Facility-Administered Medications on File Prior to Visit   Medication   • Chlorhexidine Gluconate Cloth 2 % pads     PRN Meds:.    PMH:  Past Medical History:   Diagnosis Date   • Abrasion of left lower extremity     HAS APPT WITH DR PARISI AFTER PAT APPT TO DISCUSS ABRASION   • Anxiety     TOOK MEDS IN THE PAST-NONE LAST TWO YRS   • AVM (arteriovenous malformation)    • Fracture, fibula    • Fracture, tibia and fibula    • GERD (gastroesophageal reflux disease)    • Hip pain    • History of anemia    • History of kidney stones    • Hypertension     HAS BEEN TREATED IN PAST WITH MEDS   • Left hip pain    • Shortness of breath on exertion    • Sleep apnea     NO C-PAP IN USE        PSURGH:  Past Surgical History:   Procedure Laterality Date   • APPENDECTOMY     • CARDIAC CATHETERIZATION N/A 03/09/2018    Procedure: Right Heart Cath;  Surgeon: Vladislav Monsivais MD;  Location: [facility] INVASIVE LOCATION;  Service: Cardiovascular   • CARDIAC CATHETERIZATION N/A 03/09/2018    Procedure: Left Heart Cath;  Surgeon: Vladislav Monsivais MD;  Location: [facility] INVASIVE LOCATION;  Service: Cardiovascular   • CARDIAC CATHETERIZATION N/A  03/09/2018    Procedure: Coronary angiography;  Surgeon: Vladislav Monsivais MD;  Location: [facility] INVASIVE LOCATION;  Service: Cardiovascular   • CARDIAC CATHETERIZATION N/A 03/09/2018    Procedure: Left ventriculography;  Surgeon: Vladislav Monsivais MD;  Location: [facility] INVASIVE LOCATION;  Service: Cardiovascular   • COLONOSCOPY     • CYSTOSCOPY LITHOLAPAXY BLADDER STONE EXTRACTION     • FRACTURE SURGERY Right 1995    right leg   • GASTRIC BANDING REMOVAL  08/2006   • HYSTERECTOMY  1984   • INTERVENTIONAL RADIOLOGY PROCEDURE Bilateral 03/09/2018    Procedure: Pulmonary Angiogram;  Surgeon: Vladislav Monsivais MD;  Location: [facility] CATH INVASIVE LOCATION;  Service: Cardiovascular   • LAPAROSCOPIC GASTRIC BANDING  08/2004   • THORACOSCOPY Left 06/15/2018    Procedure: BRONCHOSCOPY, LEFT VIDEO ASSISTED THORACOSCOPY WITH DAVINCI ROBOT WITH LIGATION AV MALFORMATION INTERCOSTAL NERVE BLOCK WITH CRYOPROBE;  Surgeon: Darryl Briseno III, MD;  Location: [facility] OR;  Service: [facility]       SocialHx:  Social History     Occupational History   • Not on file   Tobacco Use   • Smoking status: Never Smoker   • Smokeless tobacco: Never Used   Vaping Use   • Vaping Use: Never used   Substance and Sexual Activity   • Alcohol use: No     Comment: daily caffiene, 1 energy drink    • Drug use: No   • Sexual activity: Defer      Social History     Social History Narrative   • Not on file       FamHx:  Family History   Problem Relation Age of Onset   • Melanoma Mother    • Breast cancer Mother    • Lung cancer Father    • Diabetes Father    • Heart disease Father    • Hypertension Sister    • Heart attack Son    • Heart block Son    • Malig Hyperthermia Neg Hx          Review of Systems:   A 14 point review of systems was performed, pertinent positives discussed above, all other systems are negative    Physical Exam: 62 y.o. female  Vital Signs :   Vitals:    09/01/22 1447   BP: 138/82   BP Location: Left arm   Patient Position: Sitting   Cuff  "Size: Large Adult   Temp: 97.1 °F (36.2 °C)   TempSrc: Temporal   Weight: 114 kg (251 lb)   Height: 162.6 cm (64.02\")     General: Alert and Oriented x 3, No acute distress.  Psych: mood and affect appropriate; recent and remote memory intact  Eyes: conjunctivae clear; pupils equally round and reactive, sclerae antiicteric  CV: no peripheral edema  Resp: normal respiratory effort  Skin: no rashes or wounds; normal turgor  Musculosketetal; pain with hip range of motion. Positive Stinchfeld test. No trochanteric tenderness.  Vascular: palpable distal pulses    Labs:    Pre-Admission Testing on 09/01/2022   Component Date Value Ref Range Status   • WBC 09/01/2022 5.67  3.40 - 10.80 10*3/mm3 Final   • RBC 09/01/2022 4.55  3.77 - 5.28 10*6/mm3 Final   • Hemoglobin 09/01/2022 13.5  12.0 - 15.9 g/dL Final   • Hematocrit 09/01/2022 41.8  34.0 - 46.6 % Final   • MCV 09/01/2022 91.9  79.0 - 97.0 fL Final   • MCH 09/01/2022 29.7  26.6 - 33.0 pg Final   • MCHC 09/01/2022 32.3  31.5 - 35.7 g/dL Final   • RDW 09/01/2022 13.2  12.3 - 15.4 % Final   • RDW-SD 09/01/2022 44.1  37.0 - 54.0 fl Final   • MPV 09/01/2022 9.7  6.0 - 12.0 fL Final   • Platelets 09/01/2022 224  140 - 450 10*3/mm3 Final   • Glucose 09/01/2022 79  65 - 99 mg/dL Final   • BUN 09/01/2022 13  8 - 23 mg/dL Final   • Creatinine 09/01/2022 0.61  0.57 - 1.00 mg/dL Final   • Sodium 09/01/2022 142  136 - 145 mmol/L Final   • Potassium 09/01/2022 4.3  3.5 - 5.2 mmol/L Final   • Chloride 09/01/2022 105  98 - 107 mmol/L Final   • CO2 09/01/2022 28.8  22.0 - 29.0 mmol/L Final   • Calcium 09/01/2022 9.8  8.6 - 10.5 mg/dL Final   • BUN/Creatinine Ratio 09/01/2022 21.3  7.0 - 25.0 Final   • Anion Gap 09/01/2022 8.2  5.0 - 15.0 mmol/L Final   • eGFR 09/01/2022 100.6  >60.0 mL/min/1.73 Final    National Kidney Foundation and American Society of Nephrology (ASN) Task Force recommended calculation based on the Chronic Kidney Disease Epidemiology Collaboration (CKD-EPI) equation " refit without adjustment for race.     Xrays:  Xrays AP pelvis and a lateral of the Left hip were reviewed demonstrating  End stage hip OA with bone on bone articulation, subchondral cysts and periarticular osteophytes.    Assessment:  End-stage Left hip osteoarthritis. Conservative measures have failed.      Plan:  The plan is to proceed with Left Total Hip Replacement. The patient voiced understanding of the risks, benefits, and alternative forms of treatment that were discussed with Dr Parisi at the time of scheduling. 23 HH    Kiersten Crenshaw, APRN  9/1/2022

## 2022-09-01 NOTE — H&P
Patient: Rylee Pennington    Date of Admission: 9/12/2022    YOB: 1960    Medical Record Number: [id number]    Admitting Physician: Dr. Reg Parisi    Reason for Admission: End Stage Left Hip OA    History of Present Illness: 62 y.o. female presents with severe end stage hip osteoarthritis which has not been responsive to the full compliment of conservative measures. Despite conservative attempts, there is still severe, constant activity limiting hip pain. Given the severity of the pain, the patient has elected to proceed with hip replacement.    Allergies: No Known Allergies      Current Medications:  Home Medications:    Current Outpatient Medications on File Prior to Visit   Medication Sig   • Chlorhexidine Gluconate 2 % pads Apply  topically Take As Directed. As directed pre op     Current Facility-Administered Medications on File Prior to Visit   Medication   • Chlorhexidine Gluconate Cloth 2 % pads     PRN Meds:.    PMH:  Past Medical History:   Diagnosis Date   • Abrasion of left lower extremity     HAS APPT WITH DR PARIIS AFTER PAT APPT TO DISCUSS ABRASION   • Anxiety     TOOK MEDS IN THE PAST-NONE LAST TWO YRS   • AVM (arteriovenous malformation)    • Fracture, fibula    • Fracture, tibia and fibula    • GERD (gastroesophageal reflux disease)    • Hip pain    • History of anemia    • History of kidney stones    • Hypertension     HAS BEEN TREATED IN PAST WITH MEDS   • Left hip pain    • Shortness of breath on exertion    • Sleep apnea     NO C-PAP IN USE        PSURGH:  Past Surgical History:   Procedure Laterality Date   • APPENDECTOMY     • CARDIAC CATHETERIZATION N/A 03/09/2018    Procedure: Right Heart Cath;  Surgeon: Vladislav Monsivais MD;  Location: [facility] INVASIVE LOCATION;  Service: Cardiovascular   • CARDIAC CATHETERIZATION N/A 03/09/2018    Procedure: Left Heart Cath;  Surgeon: Vladislav Monsivais MD;  Location: [facility] INVASIVE LOCATION;  Service: Cardiovascular   • CARDIAC CATHETERIZATION N/A  03/09/2018    Procedure: Coronary angiography;  Surgeon: Vladislav Monsivais MD;  Location: [facility] INVASIVE LOCATION;  Service: Cardiovascular   • CARDIAC CATHETERIZATION N/A 03/09/2018    Procedure: Left ventriculography;  Surgeon: Vladislav Monsivais MD;  Location: [facility] INVASIVE LOCATION;  Service: Cardiovascular   • COLONOSCOPY     • CYSTOSCOPY LITHOLAPAXY BLADDER STONE EXTRACTION     • FRACTURE SURGERY Right 1995    right leg   • GASTRIC BANDING REMOVAL  08/2006   • HYSTERECTOMY  1984   • INTERVENTIONAL RADIOLOGY PROCEDURE Bilateral 03/09/2018    Procedure: Pulmonary Angiogram;  Surgeon: Vladislav Monsivais MD;  Location: [facility] CATH INVASIVE LOCATION;  Service: Cardiovascular   • LAPAROSCOPIC GASTRIC BANDING  08/2004   • THORACOSCOPY Left 06/15/2018    Procedure: BRONCHOSCOPY, LEFT VIDEO ASSISTED THORACOSCOPY WITH DAVINCI ROBOT WITH LIGATION AV MALFORMATION INTERCOSTAL NERVE BLOCK WITH CRYOPROBE;  Surgeon: Darryl Briseno III, MD;  Location: [facility] OR;  Service: [facility]       SocialHx:  Social History     Occupational History   • Not on file   Tobacco Use   • Smoking status: Never Smoker   • Smokeless tobacco: Never Used   Vaping Use   • Vaping Use: Never used   Substance and Sexual Activity   • Alcohol use: No     Comment: daily caffiene, 1 energy drink    • Drug use: No   • Sexual activity: Defer      Social History     Social History Narrative   • Not on file       FamHx:  Family History   Problem Relation Age of Onset   • Melanoma Mother    • Breast cancer Mother    • Lung cancer Father    • Diabetes Father    • Heart disease Father    • Hypertension Sister    • Heart attack Son    • Heart block Son    • Malig Hyperthermia Neg Hx          Review of Systems:   A 14 point review of systems was performed, pertinent positives discussed above, all other systems are negative    Physical Exam: 62 y.o. female  Vital Signs :   Vitals:    09/01/22 1447   BP: 138/82   BP Location: Left arm   Patient Position: Sitting   Cuff  "Size: Large Adult   Temp: 97.1 °F (36.2 °C)   TempSrc: Temporal   Weight: 114 kg (251 lb)   Height: 162.6 cm (64.02\")     General: Alert and Oriented x 3, No acute distress.  Psych: mood and affect appropriate; recent and remote memory intact  Eyes: conjunctivae clear; pupils equally round and reactive, sclerae antiicteric  CV: no peripheral edema  Resp: normal respiratory effort  Skin: no rashes or wounds; normal turgor  Musculosketetal; pain with hip range of motion. Positive Stinchfeld test. No trochanteric tenderness.  Vascular: palpable distal pulses    Labs:    Pre-Admission Testing on 09/01/2022   Component Date Value Ref Range Status   • WBC 09/01/2022 5.67  3.40 - 10.80 10*3/mm3 Final   • RBC 09/01/2022 4.55  3.77 - 5.28 10*6/mm3 Final   • Hemoglobin 09/01/2022 13.5  12.0 - 15.9 g/dL Final   • Hematocrit 09/01/2022 41.8  34.0 - 46.6 % Final   • MCV 09/01/2022 91.9  79.0 - 97.0 fL Final   • MCH 09/01/2022 29.7  26.6 - 33.0 pg Final   • MCHC 09/01/2022 32.3  31.5 - 35.7 g/dL Final   • RDW 09/01/2022 13.2  12.3 - 15.4 % Final   • RDW-SD 09/01/2022 44.1  37.0 - 54.0 fl Final   • MPV 09/01/2022 9.7  6.0 - 12.0 fL Final   • Platelets 09/01/2022 224  140 - 450 10*3/mm3 Final   • Glucose 09/01/2022 79  65 - 99 mg/dL Final   • BUN 09/01/2022 13  8 - 23 mg/dL Final   • Creatinine 09/01/2022 0.61  0.57 - 1.00 mg/dL Final   • Sodium 09/01/2022 142  136 - 145 mmol/L Final   • Potassium 09/01/2022 4.3  3.5 - 5.2 mmol/L Final   • Chloride 09/01/2022 105  98 - 107 mmol/L Final   • CO2 09/01/2022 28.8  22.0 - 29.0 mmol/L Final   • Calcium 09/01/2022 9.8  8.6 - 10.5 mg/dL Final   • BUN/Creatinine Ratio 09/01/2022 21.3  7.0 - 25.0 Final   • Anion Gap 09/01/2022 8.2  5.0 - 15.0 mmol/L Final   • eGFR 09/01/2022 100.6  >60.0 mL/min/1.73 Final    National Kidney Foundation and American Society of Nephrology (ASN) Task Force recommended calculation based on the Chronic Kidney Disease Epidemiology Collaboration (CKD-EPI) equation " refit without adjustment for race.     Xrays:  Xrays AP pelvis and a lateral of the Left hip were reviewed demonstrating  End stage hip OA with bone on bone articulation, subchondral cysts and periarticular osteophytes.    Assessment:  End-stage Left hip osteoarthritis. Conservative measures have failed.      Plan:  The plan is to proceed with Left Total Hip Replacement. The patient voiced understanding of the risks, benefits, and alternative forms of treatment that were discussed with Dr Parisi at the time of scheduling. 23 HH    Kiersten Crenshaw, APRN  9/1/2022

## 2022-09-12 ENCOUNTER — ANESTHESIA EVENT (OUTPATIENT)
Dept: PERIOP | Facility: HOSPITAL | Age: 62
End: 2022-09-12

## 2022-09-12 ENCOUNTER — HOSPITAL ENCOUNTER (OUTPATIENT)
Facility: HOSPITAL | Age: 62
Discharge: HOME HEALTH | End: 2022-09-13
Attending: ORTHOPAEDIC SURGERY | Admitting: ORTHOPAEDIC SURGERY

## 2022-09-12 ENCOUNTER — APPOINTMENT (OUTPATIENT)
Dept: GENERAL RADIOLOGY | Facility: HOSPITAL | Age: 62
End: 2022-09-12

## 2022-09-12 ENCOUNTER — ANESTHESIA (OUTPATIENT)
Dept: PERIOP | Facility: HOSPITAL | Age: 62
End: 2022-09-12

## 2022-09-12 DIAGNOSIS — M16.12 PRIMARY OSTEOARTHRITIS OF LEFT HIP: ICD-10-CM

## 2022-09-12 DIAGNOSIS — Z96.642 STATUS POST TOTAL HIP REPLACEMENT, LEFT: Primary | ICD-10-CM

## 2022-09-12 LAB
ABO GROUP BLD: NORMAL
BLD GP AB SCN SERPL QL: NEGATIVE
RH BLD: POSITIVE
T&S EXPIRATION DATE: NORMAL

## 2022-09-12 PROCEDURE — 25010000002 KETOROLAC TROMETHAMINE PER 15 MG: Performed by: ORTHOPAEDIC SURGERY

## 2022-09-12 PROCEDURE — 25010000002 PROPOFOL 10 MG/ML EMULSION: Performed by: NURSE ANESTHETIST, CERTIFIED REGISTERED

## 2022-09-12 PROCEDURE — 25010000002 FENTANYL CITRATE (PF) 50 MCG/ML SOLUTION: Performed by: NURSE ANESTHETIST, CERTIFIED REGISTERED

## 2022-09-12 PROCEDURE — 25010000002 HYDROMORPHONE PER 4 MG: Performed by: NURSE ANESTHETIST, CERTIFIED REGISTERED

## 2022-09-12 PROCEDURE — 25010000002 MORPHINE PER 10 MG: Performed by: ORTHOPAEDIC SURGERY

## 2022-09-12 PROCEDURE — 86900 BLOOD TYPING SEROLOGIC ABO: CPT | Performed by: NURSE PRACTITIONER

## 2022-09-12 PROCEDURE — 25010000002 EPINEPHRINE 1 MG/ML SOLUTION 30 ML VIAL: Performed by: ORTHOPAEDIC SURGERY

## 2022-09-12 PROCEDURE — G0378 HOSPITAL OBSERVATION PER HR: HCPCS

## 2022-09-12 PROCEDURE — 86901 BLOOD TYPING SEROLOGIC RH(D): CPT | Performed by: NURSE PRACTITIONER

## 2022-09-12 PROCEDURE — 25010000002 ROPIVACAINE PER 1 MG: Performed by: ORTHOPAEDIC SURGERY

## 2022-09-12 PROCEDURE — 25010000002 ONDANSETRON PER 1 MG: Performed by: NURSE ANESTHETIST, CERTIFIED REGISTERED

## 2022-09-12 PROCEDURE — 27130 TOTAL HIP ARTHROPLASTY: CPT | Performed by: ORTHOPAEDIC SURGERY

## 2022-09-12 PROCEDURE — 25010000002 DEXAMETHASONE PER 1 MG: Performed by: NURSE ANESTHETIST, CERTIFIED REGISTERED

## 2022-09-12 PROCEDURE — C1776 JOINT DEVICE (IMPLANTABLE): HCPCS | Performed by: ORTHOPAEDIC SURGERY

## 2022-09-12 PROCEDURE — 25010000002 VANCOMYCIN 10 G RECONSTITUTED SOLUTION: Performed by: NURSE PRACTITIONER

## 2022-09-12 PROCEDURE — 25010000002 CEFAZOLIN IN DEXTROSE 2-4 GM/100ML-% SOLUTION: Performed by: NURSE PRACTITIONER

## 2022-09-12 PROCEDURE — 73501 X-RAY EXAM HIP UNI 1 VIEW: CPT

## 2022-09-12 PROCEDURE — 27130 TOTAL HIP ARTHROPLASTY: CPT | Performed by: NURSE PRACTITIONER

## 2022-09-12 PROCEDURE — 86850 RBC ANTIBODY SCREEN: CPT | Performed by: NURSE PRACTITIONER

## 2022-09-12 PROCEDURE — 25010000002 NEOSTIGMINE 5 MG/10ML SOLUTION: Performed by: NURSE ANESTHETIST, CERTIFIED REGISTERED

## 2022-09-12 PROCEDURE — 25010000002 MIDAZOLAM PER 1 MG: Performed by: ANESTHESIOLOGY

## 2022-09-12 DEVICE — KNOTLESS TISSUE CONTROL DEVICE, UNDYED UNIDIRECTIONAL (ANTIBACTERIAL) SYNTHETIC ABSORBABLE DEVICE
Type: IMPLANTABLE DEVICE | Site: HIP | Status: FUNCTIONAL
Brand: STRATAFIX

## 2022-09-12 DEVICE — R3 3 HOLE ACETABULAR SHELL 52MM
Type: IMPLANTABLE DEVICE | Site: HIP | Status: FUNCTIONAL
Brand: R3 ACETABULAR

## 2022-09-12 DEVICE — IMPLANTABLE DEVICE: Type: IMPLANTABLE DEVICE | Status: FUNCTIONAL

## 2022-09-12 DEVICE — REFLECTION SPHERICAL HEAD SCREW 25MM
Type: IMPLANTABLE DEVICE | Site: HIP | Status: FUNCTIONAL
Brand: REFLECTION

## 2022-09-12 DEVICE — OXINIUM FEMORAL HEAD 12/14 TAPER                                    36 MM +0
Type: IMPLANTABLE DEVICE | Site: HIP | Status: FUNCTIONAL
Brand: OXINIUM

## 2022-09-12 DEVICE — POLARSTEM COLLAR STANDARD                                    NON-CEMENTED WITH TI/HA 2
Type: IMPLANTABLE DEVICE | Site: HIP | Status: FUNCTIONAL
Brand: POLARSTEM

## 2022-09-12 DEVICE — REFLECTION SPHERICAL HEAD SCREW 30MM
Type: IMPLANTABLE DEVICE | Site: HIP | Status: FUNCTIONAL
Brand: REFLECTION

## 2022-09-12 DEVICE — R3 20 DEGREE XLPE ACETABULAR LINER                                    36MM INNER DIAMETER X OUTER DIAMETER 52MM
Type: IMPLANTABLE DEVICE | Site: HIP | Status: FUNCTIONAL
Brand: R3

## 2022-09-12 DEVICE — DEV CONTRL TISS STRATAFIX SYMM PDS PLUS VIL CT-1 60CM: Type: IMPLANTABLE DEVICE | Site: HIP | Status: FUNCTIONAL

## 2022-09-12 RX ORDER — HYDROMORPHONE HYDROCHLORIDE 1 MG/ML
0.5 INJECTION, SOLUTION INTRAMUSCULAR; INTRAVENOUS; SUBCUTANEOUS
Status: DISCONTINUED | OUTPATIENT
Start: 2022-09-12 | End: 2022-09-12 | Stop reason: HOSPADM

## 2022-09-12 RX ORDER — TRANEXAMIC ACID 100 MG/ML
INJECTION, SOLUTION INTRAVENOUS AS NEEDED
Status: DISCONTINUED | OUTPATIENT
Start: 2022-09-12 | End: 2022-09-12 | Stop reason: SURG

## 2022-09-12 RX ORDER — POLYETHYLENE GLYCOL 3350 17 G/17G
17 POWDER, FOR SOLUTION ORAL 2 TIMES DAILY
Qty: 238 G | Refills: 0 | Status: SHIPPED | OUTPATIENT
Start: 2022-09-12 | End: 2022-09-20

## 2022-09-12 RX ORDER — HYDROCODONE BITARTRATE AND ACETAMINOPHEN 7.5; 325 MG/1; MG/1
1 TABLET ORAL ONCE AS NEEDED
Status: DISCONTINUED | OUTPATIENT
Start: 2022-09-12 | End: 2022-09-12 | Stop reason: HOSPADM

## 2022-09-12 RX ORDER — PROMETHAZINE HYDROCHLORIDE 25 MG/1
25 SUPPOSITORY RECTAL ONCE AS NEEDED
Status: DISCONTINUED | OUTPATIENT
Start: 2022-09-12 | End: 2022-09-12 | Stop reason: HOSPADM

## 2022-09-12 RX ORDER — SODIUM CHLORIDE 0.9 % (FLUSH) 0.9 %
3-10 SYRINGE (ML) INJECTION AS NEEDED
Status: DISCONTINUED | OUTPATIENT
Start: 2022-09-12 | End: 2022-09-12 | Stop reason: HOSPADM

## 2022-09-12 RX ORDER — ONDANSETRON 2 MG/ML
INJECTION INTRAMUSCULAR; INTRAVENOUS AS NEEDED
Status: DISCONTINUED | OUTPATIENT
Start: 2022-09-12 | End: 2022-09-12 | Stop reason: SURG

## 2022-09-12 RX ORDER — MELOXICAM 15 MG/1
15 TABLET ORAL DAILY
Status: COMPLETED | OUTPATIENT
Start: 2022-09-12 | End: 2022-09-12

## 2022-09-12 RX ORDER — FAMOTIDINE 10 MG/ML
20 INJECTION, SOLUTION INTRAVENOUS ONCE
Status: COMPLETED | OUTPATIENT
Start: 2022-09-12 | End: 2022-09-12

## 2022-09-12 RX ORDER — DIPHENHYDRAMINE HYDROCHLORIDE 50 MG/ML
12.5 INJECTION INTRAMUSCULAR; INTRAVENOUS
Status: DISCONTINUED | OUTPATIENT
Start: 2022-09-12 | End: 2022-09-12 | Stop reason: HOSPADM

## 2022-09-12 RX ORDER — NALOXONE HCL 0.4 MG/ML
0.2 VIAL (ML) INJECTION AS NEEDED
Status: DISCONTINUED | OUTPATIENT
Start: 2022-09-12 | End: 2022-09-12 | Stop reason: HOSPADM

## 2022-09-12 RX ORDER — HYDROCODONE BITARTRATE AND ACETAMINOPHEN 7.5; 325 MG/1; MG/1
TABLET ORAL
Qty: 42 TABLET | Refills: 0 | Status: SHIPPED | OUTPATIENT
Start: 2022-09-12 | End: 2022-12-08

## 2022-09-12 RX ORDER — MELOXICAM 15 MG/1
15 TABLET ORAL DAILY
Status: DISCONTINUED | OUTPATIENT
Start: 2022-09-12 | End: 2022-09-13 | Stop reason: HOSPADM

## 2022-09-12 RX ORDER — ACETAMINOPHEN 325 MG/1
650 TABLET ORAL EVERY 6 HOURS PRN
Status: DISCONTINUED | OUTPATIENT
Start: 2022-09-12 | End: 2022-09-13 | Stop reason: HOSPADM

## 2022-09-12 RX ORDER — HYDROCODONE BITARTRATE AND ACETAMINOPHEN 7.5; 325 MG/1; MG/1
1 TABLET ORAL EVERY 4 HOURS PRN
Status: DISCONTINUED | OUTPATIENT
Start: 2022-09-12 | End: 2022-09-13 | Stop reason: HOSPADM

## 2022-09-12 RX ORDER — FENTANYL CITRATE 50 UG/ML
INJECTION, SOLUTION INTRAMUSCULAR; INTRAVENOUS AS NEEDED
Status: DISCONTINUED | OUTPATIENT
Start: 2022-09-12 | End: 2022-09-12 | Stop reason: SURG

## 2022-09-12 RX ORDER — ROCURONIUM BROMIDE 10 MG/ML
INJECTION, SOLUTION INTRAVENOUS AS NEEDED
Status: DISCONTINUED | OUTPATIENT
Start: 2022-09-12 | End: 2022-09-12 | Stop reason: SURG

## 2022-09-12 RX ORDER — NEOSTIGMINE METHYLSULFATE 0.5 MG/ML
INJECTION, SOLUTION INTRAVENOUS AS NEEDED
Status: DISCONTINUED | OUTPATIENT
Start: 2022-09-12 | End: 2022-09-12 | Stop reason: SURG

## 2022-09-12 RX ORDER — ONDANSETRON 2 MG/ML
4 INJECTION INTRAMUSCULAR; INTRAVENOUS ONCE AS NEEDED
Status: DISCONTINUED | OUTPATIENT
Start: 2022-09-12 | End: 2022-09-13 | Stop reason: HOSPADM

## 2022-09-12 RX ORDER — GLYCOPYRROLATE 0.2 MG/ML
INJECTION INTRAMUSCULAR; INTRAVENOUS AS NEEDED
Status: DISCONTINUED | OUTPATIENT
Start: 2022-09-12 | End: 2022-09-12 | Stop reason: SURG

## 2022-09-12 RX ORDER — PROMETHAZINE HYDROCHLORIDE 25 MG/1
25 TABLET ORAL ONCE AS NEEDED
Status: DISCONTINUED | OUTPATIENT
Start: 2022-09-12 | End: 2022-09-12 | Stop reason: HOSPADM

## 2022-09-12 RX ORDER — IBUPROFEN 600 MG/1
600 TABLET ORAL ONCE AS NEEDED
Status: DISCONTINUED | OUTPATIENT
Start: 2022-09-12 | End: 2022-09-12 | Stop reason: HOSPADM

## 2022-09-12 RX ORDER — PANTOPRAZOLE SODIUM 40 MG/1
40 TABLET, DELAYED RELEASE ORAL DAILY
Qty: 14 TABLET | Refills: 0 | Status: SHIPPED | OUTPATIENT
Start: 2022-09-12 | End: 2022-09-27

## 2022-09-12 RX ORDER — MIDAZOLAM HYDROCHLORIDE 1 MG/ML
1 INJECTION INTRAMUSCULAR; INTRAVENOUS
Status: DISCONTINUED | OUTPATIENT
Start: 2022-09-12 | End: 2022-09-12 | Stop reason: HOSPADM

## 2022-09-12 RX ORDER — OXYCODONE AND ACETAMINOPHEN 7.5; 325 MG/1; MG/1
1 TABLET ORAL EVERY 4 HOURS PRN
Status: DISCONTINUED | OUTPATIENT
Start: 2022-09-12 | End: 2022-09-12 | Stop reason: HOSPADM

## 2022-09-12 RX ORDER — MAGNESIUM HYDROXIDE 1200 MG/15ML
LIQUID ORAL AS NEEDED
Status: DISCONTINUED | OUTPATIENT
Start: 2022-09-12 | End: 2022-09-12 | Stop reason: HOSPADM

## 2022-09-12 RX ORDER — HYDRALAZINE HYDROCHLORIDE 20 MG/ML
5 INJECTION INTRAMUSCULAR; INTRAVENOUS
Status: DISCONTINUED | OUTPATIENT
Start: 2022-09-12 | End: 2022-09-12 | Stop reason: HOSPADM

## 2022-09-12 RX ORDER — CEFAZOLIN SODIUM 2 G/100ML
2 INJECTION, SOLUTION INTRAVENOUS ONCE
Status: COMPLETED | OUTPATIENT
Start: 2022-09-12 | End: 2022-09-12

## 2022-09-12 RX ORDER — FENTANYL CITRATE 50 UG/ML
50 INJECTION, SOLUTION INTRAMUSCULAR; INTRAVENOUS
Status: DISCONTINUED | OUTPATIENT
Start: 2022-09-12 | End: 2022-09-12 | Stop reason: HOSPADM

## 2022-09-12 RX ORDER — ASPIRIN 81 MG/1
81 TABLET ORAL EVERY 12 HOURS SCHEDULED
Status: DISCONTINUED | OUTPATIENT
Start: 2022-09-13 | End: 2022-09-13 | Stop reason: HOSPADM

## 2022-09-12 RX ORDER — MELOXICAM 15 MG/1
15 TABLET ORAL DAILY
Qty: 14 TABLET | Refills: 0 | Status: SHIPPED | OUTPATIENT
Start: 2022-09-12 | End: 2022-09-27

## 2022-09-12 RX ORDER — EPHEDRINE SULFATE 50 MG/ML
5 INJECTION, SOLUTION INTRAVENOUS ONCE AS NEEDED
Status: DISCONTINUED | OUTPATIENT
Start: 2022-09-12 | End: 2022-09-12 | Stop reason: HOSPADM

## 2022-09-12 RX ORDER — FLUMAZENIL 0.1 MG/ML
0.2 INJECTION INTRAVENOUS AS NEEDED
Status: DISCONTINUED | OUTPATIENT
Start: 2022-09-12 | End: 2022-09-12 | Stop reason: HOSPADM

## 2022-09-12 RX ORDER — ASPIRIN 81 MG/1
TABLET ORAL
Qty: 60 TABLET | Refills: 0 | Status: SHIPPED | OUTPATIENT
Start: 2022-09-12 | End: 2022-10-25

## 2022-09-12 RX ORDER — UREA 10 %
1 LOTION (ML) TOPICAL NIGHTLY PRN
Status: DISCONTINUED | OUTPATIENT
Start: 2022-09-12 | End: 2022-09-13 | Stop reason: HOSPADM

## 2022-09-12 RX ORDER — LIDOCAINE HYDROCHLORIDE 10 MG/ML
0.5 INJECTION, SOLUTION EPIDURAL; INFILTRATION; INTRACAUDAL; PERINEURAL ONCE AS NEEDED
Status: DISCONTINUED | OUTPATIENT
Start: 2022-09-12 | End: 2022-09-12 | Stop reason: HOSPADM

## 2022-09-12 RX ORDER — PROPOFOL 10 MG/ML
VIAL (ML) INTRAVENOUS AS NEEDED
Status: DISCONTINUED | OUTPATIENT
Start: 2022-09-12 | End: 2022-09-12 | Stop reason: SURG

## 2022-09-12 RX ORDER — ACETAMINOPHEN 10 MG/ML
INJECTION, SOLUTION INTRAVENOUS AS NEEDED
Status: DISCONTINUED | OUTPATIENT
Start: 2022-09-12 | End: 2022-09-12 | Stop reason: SURG

## 2022-09-12 RX ORDER — POVIDONE-IODINE 10 MG/ML
SOLUTION TOPICAL ONCE
Status: COMPLETED | OUTPATIENT
Start: 2022-09-12 | End: 2022-09-12

## 2022-09-12 RX ORDER — ONDANSETRON 4 MG/1
4 TABLET, FILM COATED ORAL EVERY 8 HOURS PRN
Qty: 10 TABLET | Refills: 0 | Status: SHIPPED | OUTPATIENT
Start: 2022-09-12 | End: 2022-12-08

## 2022-09-12 RX ORDER — PROMETHAZINE HYDROCHLORIDE 12.5 MG/1
12.5 TABLET ORAL EVERY 4 HOURS PRN
Status: DISCONTINUED | OUTPATIENT
Start: 2022-09-12 | End: 2022-09-13 | Stop reason: HOSPADM

## 2022-09-12 RX ORDER — CEFAZOLIN SODIUM 2 G/100ML
2 INJECTION, SOLUTION INTRAVENOUS EVERY 8 HOURS
Status: COMPLETED | OUTPATIENT
Start: 2022-09-12 | End: 2022-09-13

## 2022-09-12 RX ORDER — DEXAMETHASONE SODIUM PHOSPHATE 4 MG/ML
INJECTION, SOLUTION INTRA-ARTICULAR; INTRALESIONAL; INTRAMUSCULAR; INTRAVENOUS; SOFT TISSUE AS NEEDED
Status: DISCONTINUED | OUTPATIENT
Start: 2022-09-12 | End: 2022-09-12 | Stop reason: SURG

## 2022-09-12 RX ORDER — HYDROCODONE BITARTRATE AND ACETAMINOPHEN 7.5; 325 MG/1; MG/1
2 TABLET ORAL EVERY 4 HOURS PRN
Status: DISCONTINUED | OUTPATIENT
Start: 2022-09-12 | End: 2022-09-13 | Stop reason: HOSPADM

## 2022-09-12 RX ORDER — ONDANSETRON 4 MG/1
4 TABLET, FILM COATED ORAL EVERY 6 HOURS PRN
Status: DISCONTINUED | OUTPATIENT
Start: 2022-09-12 | End: 2022-09-13 | Stop reason: HOSPADM

## 2022-09-12 RX ORDER — SODIUM CHLORIDE, SODIUM LACTATE, POTASSIUM CHLORIDE, CALCIUM CHLORIDE 600; 310; 30; 20 MG/100ML; MG/100ML; MG/100ML; MG/100ML
9 INJECTION, SOLUTION INTRAVENOUS CONTINUOUS
Status: DISCONTINUED | OUTPATIENT
Start: 2022-09-12 | End: 2022-09-13 | Stop reason: HOSPADM

## 2022-09-12 RX ORDER — ONDANSETRON 2 MG/ML
4 INJECTION INTRAMUSCULAR; INTRAVENOUS ONCE AS NEEDED
Status: DISCONTINUED | OUTPATIENT
Start: 2022-09-12 | End: 2022-09-12 | Stop reason: HOSPADM

## 2022-09-12 RX ORDER — LABETALOL HYDROCHLORIDE 5 MG/ML
5 INJECTION, SOLUTION INTRAVENOUS
Status: DISCONTINUED | OUTPATIENT
Start: 2022-09-12 | End: 2022-09-12 | Stop reason: HOSPADM

## 2022-09-12 RX ORDER — LABETALOL HYDROCHLORIDE 5 MG/ML
INJECTION, SOLUTION INTRAVENOUS AS NEEDED
Status: DISCONTINUED | OUTPATIENT
Start: 2022-09-12 | End: 2022-09-12 | Stop reason: SURG

## 2022-09-12 RX ORDER — SODIUM CHLORIDE 0.9 % (FLUSH) 0.9 %
3 SYRINGE (ML) INJECTION EVERY 12 HOURS SCHEDULED
Status: DISCONTINUED | OUTPATIENT
Start: 2022-09-12 | End: 2022-09-12 | Stop reason: HOSPADM

## 2022-09-12 RX ORDER — DIPHENHYDRAMINE HCL 25 MG
25 CAPSULE ORAL
Status: DISCONTINUED | OUTPATIENT
Start: 2022-09-12 | End: 2022-09-12 | Stop reason: HOSPADM

## 2022-09-12 RX ORDER — PREGABALIN 75 MG/1
150 CAPSULE ORAL ONCE
Status: COMPLETED | OUTPATIENT
Start: 2022-09-12 | End: 2022-09-12

## 2022-09-12 RX ADMIN — LABETALOL HYDROCHLORIDE 5 MG: 5 INJECTION, SOLUTION INTRAVENOUS at 11:33

## 2022-09-12 RX ADMIN — DEXAMETHASONE SODIUM PHOSPHATE 10 MG: 4 INJECTION, SOLUTION INTRAMUSCULAR; INTRAVENOUS at 11:16

## 2022-09-12 RX ADMIN — FENTANYL CITRATE 50 MCG: 0.05 INJECTION, SOLUTION INTRAMUSCULAR; INTRAVENOUS at 11:16

## 2022-09-12 RX ADMIN — ROCURONIUM BROMIDE 20 MG: 50 INJECTION INTRAVENOUS at 11:53

## 2022-09-12 RX ADMIN — CEFAZOLIN SODIUM 2 G: 2 INJECTION, SOLUTION INTRAVENOUS at 19:48

## 2022-09-12 RX ADMIN — LABETALOL HYDROCHLORIDE 5 MG: 5 INJECTION, SOLUTION INTRAVENOUS at 11:26

## 2022-09-12 RX ADMIN — FENTANYL CITRATE 50 MCG: 0.05 INJECTION, SOLUTION INTRAMUSCULAR; INTRAVENOUS at 11:02

## 2022-09-12 RX ADMIN — CEFAZOLIN SODIUM 2 G: 2 INJECTION, SOLUTION INTRAVENOUS at 10:54

## 2022-09-12 RX ADMIN — SODIUM CHLORIDE, POTASSIUM CHLORIDE, SODIUM LACTATE AND CALCIUM CHLORIDE 500 ML: 600; 310; 30; 20 INJECTION, SOLUTION INTRAVENOUS at 09:04

## 2022-09-12 RX ADMIN — FENTANYL CITRATE 50 MCG: 50 INJECTION INTRAMUSCULAR; INTRAVENOUS at 13:31

## 2022-09-12 RX ADMIN — HYDROCODONE BITARTRATE AND ACETAMINOPHEN 1 TABLET: 7.5; 325 TABLET ORAL at 19:49

## 2022-09-12 RX ADMIN — SODIUM CHLORIDE, POTASSIUM CHLORIDE, SODIUM LACTATE AND CALCIUM CHLORIDE 9 ML/HR: 600; 310; 30; 20 INJECTION, SOLUTION INTRAVENOUS at 10:09

## 2022-09-12 RX ADMIN — PROPOFOL 200 MG: 10 INJECTION, EMULSION INTRAVENOUS at 11:02

## 2022-09-12 RX ADMIN — GLYCOPYRROLATE 0.4 MG: 0.2 INJECTION INTRAMUSCULAR; INTRAVENOUS at 12:55

## 2022-09-12 RX ADMIN — MELOXICAM 15 MG: 15 TABLET ORAL at 19:49

## 2022-09-12 RX ADMIN — ONDANSETRON 4 MG: 2 INJECTION INTRAMUSCULAR; INTRAVENOUS at 12:28

## 2022-09-12 RX ADMIN — HYDROMORPHONE HYDROCHLORIDE 0.5 MG: 1 INJECTION, SOLUTION INTRAMUSCULAR; INTRAVENOUS; SUBCUTANEOUS at 14:06

## 2022-09-12 RX ADMIN — LABETALOL HYDROCHLORIDE 5 MG: 5 INJECTION, SOLUTION INTRAVENOUS at 11:35

## 2022-09-12 RX ADMIN — PREGABALIN 150 MG: 75 CAPSULE ORAL at 10:07

## 2022-09-12 RX ADMIN — LABETALOL HYDROCHLORIDE 5 MG: 5 INJECTION, SOLUTION INTRAVENOUS at 11:23

## 2022-09-12 RX ADMIN — MELOXICAM 15 MG: 15 TABLET ORAL at 10:07

## 2022-09-12 RX ADMIN — NEOSTIGMINE METHYLSULFATE 4 MG: 0.5 INJECTION INTRAVENOUS at 12:55

## 2022-09-12 RX ADMIN — TRANEXAMIC ACID 1000 MG: 1 INJECTION, SOLUTION INTRAVENOUS at 11:16

## 2022-09-12 RX ADMIN — POVIDONE-IODINE: 10 SOLUTION TOPICAL at 09:11

## 2022-09-12 RX ADMIN — ACETAMINOPHEN 1000 MG: 10 INJECTION, SOLUTION INTRAVENOUS at 11:16

## 2022-09-12 RX ADMIN — MIDAZOLAM HYDROCHLORIDE 1 MG: 1 INJECTION, SOLUTION INTRAMUSCULAR; INTRAVENOUS at 09:23

## 2022-09-12 RX ADMIN — SODIUM CHLORIDE, POTASSIUM CHLORIDE, SODIUM LACTATE AND CALCIUM CHLORIDE: 600; 310; 30; 20 INJECTION, SOLUTION INTRAVENOUS at 11:43

## 2022-09-12 RX ADMIN — ROCURONIUM BROMIDE 50 MG: 50 INJECTION INTRAVENOUS at 11:02

## 2022-09-12 RX ADMIN — VANCOMYCIN HYDROCHLORIDE 1750 MG: 10 INJECTION, POWDER, LYOPHILIZED, FOR SOLUTION INTRAVENOUS at 09:11

## 2022-09-12 RX ADMIN — TRANEXAMIC ACID 1000 MG: 1 INJECTION, SOLUTION INTRAVENOUS at 12:29

## 2022-09-12 RX ADMIN — FAMOTIDINE 20 MG: 10 INJECTION, SOLUTION INTRAVENOUS at 09:23

## 2022-09-12 RX ADMIN — PROPOFOL 150 MCG/KG/MIN: 10 INJECTION, EMULSION INTRAVENOUS at 11:02

## 2022-09-12 NOTE — ANESTHESIA POSTPROCEDURE EVALUATION
Patient: Rylee Pennington    Procedure Summary     Date: 09/12/22 Room / Location:  [facility] [address] [place] [address] [facility]    Anesthesia Start: 1057 Anesthesia Stop: 1313    Procedure: TOTAL HIP ARTHROPLASTY (Left Hip) Diagnosis:       Primary osteoarthritis of left hip      (Primary osteoarthritis of left hip [M16.12])    Surgeons: Reg Parisi MD Provider: Perfecto Angulo MD    Anesthesia Type: general ASA Status: 3          Anesthesia Type: general    Vitals  Vitals Value Taken Time   /80 09/12/22 1447   Temp 34.7 °C (94.5 °F) 09/12/22 1310   Pulse 47 09/12/22 1454   Resp 14 09/12/22 1430   SpO2 100 % 09/12/22 1454   Vitals shown include unvalidated device data.        Post Anesthesia Care and Evaluation    Patient location during evaluation: bedside  Patient participation: complete - patient participated  Level of consciousness: awake and alert  Pain score: 0  Pain management: adequate    Airway patency: patent  Anesthetic complications: No anesthetic complications    Cardiovascular status: acceptable  Respiratory status: acceptable  Hydration status: acceptable    Comments: BP (!) 182/85   Pulse 51   Temp 34.7 °C (94.5 °F) (Oral)   Resp 14   SpO2 100%

## 2022-09-12 NOTE — OP NOTE
Name: Rylee Pennington  YOB: 1960    DATE OF SURGERY: 9/12/2022    PREOPERATIVE DIAGNOSIS: Left hip end-stage osteoarthritis    POSTOPERATIVE DIAGNOSIS: Left hip end-stage osteoarthritis    PROCEDURE PERFORMED: Left  total hip replacement    SURGEON: Reg Parisi M.D.    ASSISTANT: JULIAN CARLTON    A surgical assistant was integral in ensuring a successful outcome with this procedure.  The assistant was utilized to assist in positioning the patient, draping the patient, was used throughout the case to provide with retraction of tissues, suctioning of blood and body fluids for visualization, positioning of the extremity to allow for proper exposure so that I could perform the procedure.  Without the use of a surgical assistant during this procedure I feel that the outcome may have been compromised or would have been suboptimal or at risk for complications.    IMPLANTS:  Implant Name Type Inv. Item Serial No.  Lot No. LRB No. Used Action   DEV CONTRL TISS STRATAFIX SYMM PDS PLUS FAMILIA CT-1 60CM - [id number] Implant DEV CONTRL TISS STRATAFIX SYMM PDS PLUS FAMILIA CT-1 60CM  [employer]  DIV OF J AND J [id number] Left 1 Implanted   DEV CONTRL TISS STRATAFIX SPIRAL MNCRYL UD 3/0 PLS 30CM - [id number] Implant DEV CONTRL TISS STRATAFIX SPIRAL MNCRYL UD 3/0 PLS 30CM  [employer] ENDO [employer]  DIV OF J AND J [id number] Left 1 Implanted   SCRW SPH HD REFLECTION 6.5X25MM - [id number] Implant SCRW SPH HD REFLECTION 6.5X25MM  [employer] [id number] Left 1 Implanted   SCRW SPH HD REFLECTION 6.5X30MM - [id number] Implant SCRW SPH HD REFLECTION 6.5X30MM  [employer] [id number] Left 1 Implanted   SHLL ACET R3 3H STD 52MM - [id number] Implant SHLL ACET R3 3H STD 52MM  [employer] [id number] Left 1 Implanted   LINER ACET R3 XLPE 20D [id number] - [id number] Implant LINER ACET R3 XLPE 20D [id number]  [employer] [id number] Left 1 Implanted   HD FEM/HIP OXINIUM TPR 12/14 36MM PLS0 - [id number] Implant HD FEM/HIP OXINIUM TPR  12/14 36MM PLS0  [employer] [id number] Left 1 Implanted   STEM FEM/HIP POLARSTEM W/COLR STD SZ2 - [id number] Implant STEM FEM/HIP POLARSTEM W/COLR STD SZ2  [employer] [id number] Left 1 Implanted   DEV CONTRL TISS STRATAFIXSPIRALMNCRYL PLSPS2 REV3/0 45CM - [id number] Implant DEV CONTRL TISS STRATAFIXSPIRALMNCRYL PLSPS2 REV3/0 45CM  [employer]  DIV OF J AND J [id number] Left 1 Implanted       Estimated Blood Loss: 200 mL  Specimens : none  Complications: none    DESCRIPTION OF PROCEDURE:    The patient was taken to the operating room and placed in the supine position. A sequential compression device was carefully placed on the non-operative leg. Preoperative antibiotics were administered. Surgical time out was performed. After adequate induction of anesthesia the patient was then transferred onto the  table and positioned appropriately in the lateral decubitus position. The hip was then prepped and draped in the usual sterile fashion.    A posterior lateral surgical incision was then made.  The gluteus venkat fascia was then divided.  The gluteus venkat muscle was then bluntly dissected.  A Charnley self-retaining retractor was then placed.  A posterior capsulotomy was then performed.  The superior limb was divided in line with the piriformis tendon.  The hip was then dislocated.  There were end-stage arthritic findings.  The femoral neck osteotomy was performed according to the level dictated by the template.  The acetabulum was exposed with standard retractors.  The labrum and pulvinar were then excised.  The cup was then medialized with the starting acetabular reamer to the medial wall.  I then progressively reamed up to the appropriate size in 45°of abduction and 20° of anteversion. Line to line reaming was performed. At this point the bone was nicely prepared.  There was excellent bleeding bone. We then impacted the acetabular component in 45 of abduction and 20 of anteversion the cup was stable.  Per routine  we augmented the fixation with 2 screws in the posterior and superior quadrant  The final liner was then placed and it locked in nicely.  At this point we injected the hip with anesthetic cocktail solution.  We then turned our attention to the femur.   Standard retractors were placed to expose the femur.  The box osteotome was used to create a starting point.  The canal finder was then used to sound the canal.  The lateralizing reamer was then used to lateralize into the greater trochanter.  We progressively reamed and broached until the broach was very solid to axial and rotational stresses.  At this point we placed the trial neck and head.  Hip was very stable to flexion and internal rotation as well as extension and external rotation.  The leg lengths were measured to be equal.  We then removed the trial components, copiously irrigated the hip, and then impacted the final stem.  At this point we then trialed and chose the final head. The head was placed on a clean dry taper.  The leg lengths were again measured to be equal.  At this point the hip was copiously irrigated with pulse lavage and the capsule was then reapproximated with #1 PDS suture, and the remainder of the hip was closed in multiple layers in standard fashion..  There was excellent hemostasis. We placed a one-eighth inch Hemovac drain.  Sterile dressings were applied. At the end of the case, the sponge and needle counts were reported as being correct. There were no known complications. The patient was then transported to the recovery room.      Reg Parisi M.D.  9/12/2022

## 2022-09-12 NOTE — ANESTHESIA PREPROCEDURE EVALUATION
Anesthesia Evaluation     Patient summary reviewed and Nursing notes reviewed   no history of anesthetic complications:  NPO Solid Status: > 8 hours  NPO Liquid Status: > 2 hours           Airway   Mallampati: II  TM distance: >3 FB  Neck ROM: full  no difficulty expected  Dental - normal exam     Pulmonary - normal exam   (+) shortness of breath, sleep apnea on CPAP,   (-) COPD, asthma, not a smoker, lung cancer  Cardiovascular - normal exam  Exercise tolerance: good (4-7 METS)    ECG reviewed  Rhythm: regular  Rate: normal    (+) hypertension poorly controlled less than 2 medications, BASILIO,   (-) valvular problems/murmurs, past MI, CAD, dysrhythmias, angina, CHF, cardiac stents, CABG, pericardial effusion      Neuro/Psych  (+) psychiatric history Anxiety,    (-) seizures, TIA, CVA  GI/Hepatic/Renal/Endo    (+) obesity,  GERD well controlled,    (-) PUD, hepatitis, liver disease, no renal disease, diabetes, GI bleed, no thyroid disorder    Musculoskeletal     Abdominal   (+) obese,     Abdomen: soft.   Substance History - negative use     OB/GYN          Other   arthritis,                      Anesthesia Plan    ASA 3     general     intravenous induction     Anesthetic plan, risks, benefits, and alternatives have been provided, discussed and informed consent has been obtained with: patient.    Plan discussed with CRNA.        CODE STATUS:

## 2022-09-12 NOTE — ANESTHESIA PROCEDURE NOTES
Airway  Urgency: elective    Date/Time: 9/12/2022 11:08 AM  Airway not difficult    General Information and Staff    Patient location during procedure: OR  CRNA/CAA: Philly Wells CRNA    Indications and Patient Condition  Indications for airway management: airway protection    Preoxygenated: yes  Mask difficulty assessment: 2 - vent by mask + OA or adjuvant +/- NMBA    Final Airway Details  Final airway type: endotracheal airway      Successful airway: ETT  Cuffed: yes   Successful intubation technique: direct laryngoscopy  Endotracheal tube insertion site: oral  Blade: Rodriguez  Blade size: 2  ETT size (mm): 7.0  Cormack-Lehane Classification: grade I - full view of glottis  Placement verified by: chest auscultation and capnometry   Cuff volume (mL): 8  Number of attempts at approach: 1  Assessment: lips, teeth, and gum same as pre-op and atraumatic intubation    Additional Comments  PreO2 with 100% O2;  FeO2 >85%;  sniff position; easy mask ventilation;  Intubated with no difficultly after eyes taped; Appears atraumatic;  Lips and teeth intact as preop condition;  Airway secured. Connected to ventilator.

## 2022-09-13 ENCOUNTER — HOME HEALTH ADMISSION (OUTPATIENT)
Dept: HOME HEALTH SERVICES | Facility: HOME HEALTHCARE | Age: 62
End: 2022-09-13

## 2022-09-13 VITALS
OXYGEN SATURATION: 94 % | BODY MASS INDEX: 42.85 KG/M2 | HEIGHT: 64 IN | WEIGHT: 251 LBS | TEMPERATURE: 97.4 F | DIASTOLIC BLOOD PRESSURE: 75 MMHG | HEART RATE: 84 BPM | RESPIRATION RATE: 16 BRPM | SYSTOLIC BLOOD PRESSURE: 148 MMHG

## 2022-09-13 LAB
HCT VFR BLD AUTO: 35.4 % (ref 34–46.6)
HGB BLD-MCNC: 11.6 G/DL (ref 12–15.9)

## 2022-09-13 PROCEDURE — 85014 HEMATOCRIT: CPT | Performed by: NURSE PRACTITIONER

## 2022-09-13 PROCEDURE — G0378 HOSPITAL OBSERVATION PER HR: HCPCS

## 2022-09-13 PROCEDURE — 25010000002 CEFAZOLIN IN DEXTROSE 2-4 GM/100ML-% SOLUTION: Performed by: NURSE PRACTITIONER

## 2022-09-13 PROCEDURE — 85018 HEMOGLOBIN: CPT | Performed by: NURSE PRACTITIONER

## 2022-09-13 PROCEDURE — 97161 PT EVAL LOW COMPLEX 20 MIN: CPT

## 2022-09-13 PROCEDURE — 99024 POSTOP FOLLOW-UP VISIT: CPT | Performed by: NURSE PRACTITIONER

## 2022-09-13 PROCEDURE — 97530 THERAPEUTIC ACTIVITIES: CPT

## 2022-09-13 RX ADMIN — HYDROCODONE BITARTRATE AND ACETAMINOPHEN 1 TABLET: 7.5; 325 TABLET ORAL at 12:32

## 2022-09-13 RX ADMIN — CEFAZOLIN SODIUM 2 G: 2 INJECTION, SOLUTION INTRAVENOUS at 04:34

## 2022-09-13 RX ADMIN — HYDROCODONE BITARTRATE AND ACETAMINOPHEN 1 TABLET: 7.5; 325 TABLET ORAL at 08:02

## 2022-09-13 RX ADMIN — MELOXICAM 15 MG: 15 TABLET ORAL at 08:02

## 2022-09-13 RX ADMIN — ASPIRIN 81 MG: 81 TABLET, COATED ORAL at 08:02

## 2022-09-13 NOTE — PLAN OF CARE
Goal Outcome Evaluation:   VSS- Pt alert and oriented. POD 0 of a left total hip. Dressing C/d/I. Neurovascular checks wdl. Pt eating, drinking and voiding well. Pain well controlled. Pt to discharge home tomorrow.

## 2022-09-13 NOTE — PLAN OF CARE
Goal Outcome Evaluation:  Plan of Care Reviewed With: patient           Outcome Evaluation: Pt is a 61 y/o F POD 1 s/p L JESS. Pt reports being I with all mobility, lives alone but will have daughter stay to assist following D/C, and has 2 PAULA with no HR. Pt completed JESS protocol x 10 and encouraged to perform throughout the day. Pt stood x 2 requiring SBA. Pt ambulated 225' c RW requiring CGA. Pt UIC at end of session with all needs in reach. PT reviewed post/lat hip precautions. PT recommends pt return home with assist and HHPT upon D/C. Pt will benefit from acute PT to address strength, ROM, and functional mobility.

## 2022-09-13 NOTE — THERAPY EVALUATION
Patient Name: Rylee Pennington  : 1960    MRN: [id number]                              Today's Date: 2022       Admit Date: 2022    Visit Dx:     ICD-10-CM ICD-9-CM   1. Status post total hip replacement, left  Z96.642 V43.64   2. Primary osteoarthritis of left hip  M16.12 715.15     Patient Active Problem List   Diagnosis   • Arteriovenous malformation (AVM)   • BASILIO (dyspnea on exertion)   • Arterio-venous malformation   • Primary localized osteoarthritis of left hip   • Greater trochanteric bursitis of left hip     Past Medical History:   Diagnosis Date   • Abrasion of left lower extremity     HAS APPT WITH DR WOOD AFTER PAT APPT TO DISCUSS ABRASION   • Anxiety     TOOK MEDS IN THE PAST-NONE LAST TWO YRS   • AVM (arteriovenous malformation)    • Fracture, fibula    • Fracture, tibia and fibula    • GERD (gastroesophageal reflux disease)    • Hip pain    • History of anemia    • History of kidney stones    • Hypertension     HAS BEEN TREATED IN PAST WITH MEDS   • Left hip pain    • Shortness of breath on exertion    • Sleep apnea     NO C-PAP IN USE     Past Surgical History:   Procedure Laterality Date   • APPENDECTOMY     • CARDIAC CATHETERIZATION N/A 2018    Procedure: Right Heart Cath;  Surgeon: Vladislav Monsivais MD;  [facility];  Service: Cardiovascular   • CARDIAC CATHETERIZATION N/A 2018    Procedure: Left Heart Cath;  Surgeon: Vladislav Monsivais MD;  [facility];  Service: Cardiovascular   • CARDIAC CATHETERIZATION N/A 2018    Procedure: Coronary angiography;  Surgeon: Vladislav Monsivais MD;  [facility];  Service: Cardiovascular   • CARDIAC CATHETERIZATION N/A 2018    Procedure: Left ventriculography;  Surgeon: Vladislav Monsivais MD;  [facility];  Service: Cardiovascular   • COLONOSCOPY     • CYSTOSCOPY LITHOLAPAXY BLADDER STONE EXTRACTION     • FRACTURE SURGERY Right     right  leg   • GASTRIC BANDING REMOVAL  08/2006   • HYSTERECTOMY  1984   • INTERVENTIONAL RADIOLOGY PROCEDURE Bilateral 03/09/2018    Procedure: Pulmonary Angiogram;  Surgeon: Vladislav Monsivais MD;  Location:  INVASIVE LOCATION;  Service: Cardiovascular   • LAPAROSCOPIC GASTRIC BANDING  08/2004   • THORACOSCOPY Left 06/15/2018    Procedure: BRONCHOSCOPY, LEFT VIDEO ASSISTED THORACOSCOPY WITH DAVINCI ROBOT WITH LIGATION AV MALFORMATION INTERCOSTAL NERVE BLOCK WITH CRYOPROBE;  Surgeon: Darryl Briseno III, MD;  Location: [facility] MAIN OR;  Service: DaVinci   • TOTAL HIP ARTHROPLASTY Left 9/12/2022    Procedure: TOTAL HIP ARTHROPLASTY;  Surgeon: Reg Parisi MD;  Location: [facility] OR [facility];  Service: Orthopedics;  Laterality: Left;      General Information     Row Name 09/13/22 0913          Physical Therapy Time and Intention    Document Type evaluation  -CS     Mode of Treatment individual therapy;physical therapy  -CS     Row Name 09/13/22 0913          General Information    Patient Profile Reviewed yes  -CS     Prior Level of Function independent:;all household mobility;gait;transfer;bed mobility  -CS     Existing Precautions/Restrictions fall;left;hip, posterior  -CS     Barriers to Rehab none identified  -CS     Row Name 09/13/22 0913          Living Environment    People in Home alone;grandchild(jagjit)  grandson away for college  -CS     Row Name 09/13/22 0913          Home Main Entrance    Number of Stairs, Main Entrance two  -CS     Stair Railings, Main Entrance none  -CS     Row Name 09/13/22 0913          Stairs Within Home, Primary    Number of Stairs, Within Home, Primary none  -CS     Row Name 09/13/22 0913          Cognition    Orientation Status (Cognition) oriented x 4  -CS     Row Name 09/13/22 0913          Safety Issues, Functional Mobility    Impairments Affecting Function (Mobility) endurance/activity tolerance;pain;range of motion (ROM);strength  -CS           User Key  (r) = Recorded By, (t) = Taken  By, (c) = Cosigned By    Initials Name Provider Type    Yamileth Monroe, PT Physical Therapist               Mobility     Row Name 09/13/22 0914          Bed Mobility    Bed Mobility supine-sit;sit-supine  -CS     Supine-Sit [place] (Bed Mobility) not tested  -CS     Sit-Supine [place] (Bed Mobility) not tested  -CS     Comment, (Bed Mobility) EOB upon arrival; UIC at end of session  -CS     Row Name 09/13/22 0914          Sit-Stand Transfer    Sit-Stand [place] (Transfers) standby assist  -CS     Assistive Device (Sit-Stand Transfers) walker, front-wheeled  -CS     Comment, (Sit-Stand Transfer) x 2  -CS     Row Name 09/13/22 0914          Gait/Stairs (Locomotion)    [place] Level (Gait) contact guard;verbal cues  -CS     Assistive Device (Gait) walker, front-wheeled  -CS     Distance in Feet (Gait) 225'  -CS     Deviations/Abnormal Patterns (Gait) antalgic;fortino decreased;gait speed decreased;stride length decreased;weight shifting decreased  -CS     [place] Level (Stairs) not tested  -CS     Row Name 09/13/22 0914          Mobility    Extremity Weight-bearing Status left lower extremity  -CS     Left Lower Extremity (Weight-bearing Status) weight-bearing as tolerated (WBAT)  -CS           User Key  (r) = Recorded By, (t) = Taken By, (c) = Cosigned By    Initials Name Provider Type    Yamileth Monroe, PT Physical Therapist               Obj/Interventions     Row Name 09/13/22 0915          Range of Motion Comprehensive    General Range of Motion bilateral lower extremity ROM WFL  -CS     Comment, General Range of Motion L LE limited secondary to post-op  -CS     Row Name 09/13/22 0915          Strength Comprehensive (MMT)    General Manual Muscle Testing (MMT) Assessment no strength deficits identified  -CS     Comment, General Manual Muscle Testing (MMT) Assessment L LE limited secondary to post-op  -CS     Row Name 09/13/22 0915          Motor Skills    Therapeutic Exercise other  (see comments)  10 reps JESS protocol  -CS     Row Name 09/13/22 0915          Balance    Balance Assessment sitting static balance;sitting dynamic balance;standing static balance;standing dynamic balance  -CS     Static Sitting Balance supervision  -CS     Dynamic Sitting Balance supervision  -CS     Position, Sitting Balance unsupported;sitting edge of bed  -CS     Static Standing Balance standby assist  -CS     Dynamic Standing Balance contact guard  -CS     Position/Device Used, Standing Balance walker, front-wheeled  -CS           User Key  (r) = Recorded By, (t) = Taken By, (c) = Cosigned By    Initials Name Provider Type    CS Yamileth Smith, PT Physical Therapist               Goals/Plan     Row Name 09/13/22 0919          Bed Mobility Goal 1 (PT)    Activity/Assistive Device (Bed Mobility Goal 1, PT) sit to supine;supine to sit  -CS     [place] Level/Cues Needed (Bed Mobility Goal 1, PT) modified independence  -CS     Time Frame (Bed Mobility Goal 1, PT) 1 week  -     Row Name 09/13/22 0919          Transfer Goal 1 (PT)    Activity/Assistive Device (Transfer Goal 1, PT) sit-to-stand/stand-to-sit;bed-to-chair/chair-to-bed  -CS     [place] Level/Cues Needed (Transfer Goal 1, PT) modified independence  -CS     Time Frame (Transfer Goal 1, PT) 1 week  -     Row Name 09/13/22 0919          Gait Training Goal 1 (PT)    Activity/Assistive Device (Gait Training Goal 1, PT) gait (walking locomotion);assistive device use  -CS     [place] Level (Gait Training Goal 1, PT) standby assist  -CS     Distance (Gait Training Goal 1, PT) 200'  -CS     Time Frame (Gait Training Goal 1, PT) 1 week  -CS           User Key  (r) = Recorded By, (t) = Taken By, (c) = Cosigned By    Initials Name Provider Type    CS Yamileth Smith, PT Physical Therapist               Clinical Impression     Row Name 09/13/22 0916          Pain    Pretreatment Pain Rating 0/10 - no pain  -CS     Posttreatment Pain Rating 0/10 - no  pain  -CS     Row Name 09/13/22 0916          Plan of Care Review    Plan of Care Reviewed With patient  -CS     Outcome Evaluation Pt is a 61 y/o F POD 1 s/p L JESS. Pt reports being I with all mobility, lives alone but will have daughter stay to assist following D/C, and has 2 PAULA with no HR. Pt completed JESS protocol x 10 and encouraged to perform throughout the day. Pt stood x 2 requiring SBA. Pt ambulated 225' c RW requiring CGA. Pt UIC at end of session with all needs in reach. PT reviewed post/lat hip precautions. PT recommends pt return home with assist and HHPT upon D/C. Pt will benefit from acute PT to address strength, ROM, and functional mobility.  -CS     Row Name 09/13/22 0916          Therapy Assessment/Plan (PT)    Patient/Family Therapy Goals Statement (PT) to return home  -CS     Rehab Potential (PT) good, to achieve stated therapy goals  -CS     Criteria for Skilled Interventions Met (PT) yes;meets criteria  -CS     Therapy Frequency (PT) daily  -CS     Row Name 09/13/22 0916          Positioning and Restraints    Pre-Treatment Position in bed  -CS     Post Treatment Position chair  -CS     In Chair reclined;call light within reach;encouraged to call for assist;exit alarm on  -CS           User Key  (r) = Recorded By, (t) = Taken By, (c) = Cosigned By    Initials Name Provider Type    CS Yamileth Smith, PT Physical Therapist               Outcome Measures     Row Name 09/13/22 0919          How much help from another person do you currently need...    Turning from your back to your side while in flat bed without using bedrails? 4  -CS     Moving from lying on back to sitting on the side of a flat bed without bedrails? 4  -CS     Moving to and from a bed to a chair (including a wheelchair)? 4  -CS     Standing up from a chair using your arms (e.g., wheelchair, bedside chair)? 4  -CS     Climbing 3-5 steps with a railing? 3  -CS     To walk in hospital room? 3  -CS     AM-PAC 6 Clicks Score (PT) 22   -     Highest level of mobility 7 --> Walked 25 feet or more  -     Row Name 09/13/22 0919          Functional Assessment    Outcome Measure Options AM-PAC 6 Clicks Basic Mobility (PT)  -           User Key  (r) = Recorded By, (t) = Taken By, (c) = Cosigned By    Initials Name Provider Type    CS Yamileth Smith, PT Physical Therapist                             Physical Therapy Education                 Title: PT OT SLP Therapies (Done)     Topic: Physical Therapy (Done)     Point: Mobility training (Done)     Learning Progress Summary           Patient Acceptance, E,TB, VU,DU by  at 9/13/2022 0920                   Point: Home exercise program (Done)     Learning Progress Summary           Patient Acceptance, E,TB, VU,DU by  at 9/13/2022 0920                   Point: Body mechanics (Done)     Learning Progress Summary           Patient Acceptance, E,TB, VU,DU by  at 9/13/2022 0920                   Point: Precautions (Done)     Learning Progress Summary           Patient Acceptance, E,TB, VU,DU by  at 9/13/2022 0920                               User Key     Initials Effective Dates Name Provider Type Discipline     07/25/22 -  Yamileth Smith, PT Physical Therapist PT              PT Recommendation and Plan     Plan of Care Reviewed With: patient  Outcome Evaluation: Pt is a 63 y/o F POD 1 s/p L JESS. Pt reports being I with all mobility, lives alone but will have daughter stay to assist following D/C, and has 2 PAULA with no HR. Pt completed JESS protocol x 10 and encouraged to perform throughout the day. Pt stood x 2 requiring SBA. Pt ambulated 225' c RW requiring CGA. Pt UIC at end of session with all needs in reach. PT reviewed post/lat hip precautions. PT recommends pt return home with assist and HHPT upon D/C. Pt will benefit from acute PT to address strength, ROM, and functional mobility.     Time Calculation:    PT Charges     Row Name 09/13/22 0920             Time Calculation    Start Time  0845  -CS      Stop Time 0904  -CS      Time Calculation (min) 19 min  -CS      PT Received On 09/13/22  -CS      PT - Next Appointment 09/14/22  -CS      PT Goal Re-Cert Due Date 09/20/22  -CS              Time Calculation- PT    Total Timed Code Minutes- PT 18 minute(s)  -CS              Timed Charges    46430 - Gait Training Minutes  8  -CS      17401 - PT Therapeutic Activity Minutes 10  -CS              Total Minutes    Timed Charges Total Minutes 18  -CS       Total Minutes 18  -CS            User Key  (r) = Recorded By, (t) = Taken By, (c) = Cosigned By    Initials Name Provider Type    CS Raeann Smith, PT Physical Therapist              Therapy Charges for Today     Code Description Service Date Service Provider Modifiers Qty    25185523379 HC PT THERAPEUTIC ACT EA 15 MIN 9/13/2022 Raeann Smith, PT GP 1    24083620639 HC PT EVAL LOW COMPLEXITY 2 9/13/2022 Raeann Smith, PT GP 1          PT G-Codes  Outcome Measure Options: AM-PAC 6 Clicks Basic Mobility (PT)  AM-PAC 6 Clicks Score (PT): 22    RAEANN SMITH PT  9/13/2022

## 2022-09-13 NOTE — DISCHARGE SUMMARY
Patient Name: Rylee Pennington  Patient YOB: 1960    Date of Admission:  9/12/2022  Date of Discharge:  9/13/2022  Discharge Diagnosis: NM TOTAL HIP ARTHROPLASTY [08285] (TOTAL HIP ARTHROPLASTY)  Presenting Problem/History of Present Illness: Primary osteoarthritis of left hip [M16.12]  Admitting Physician: Dr Reg Parisi  Consults:   Consults     No orders found for last 30 day(s).          DETAILS OF HOSPITAL STAY:  Patient is a 62 y.o. female was admitted to the floor following the above procedure and underwent an uncomplicated hospital stay.  Patient did well with physical therapy and was ambulating well without problems.  On the day of discharge the wound was clean, dry and intact and calf was soft and nontender and Homans sign was negative.  Patient was tolerating   Diet Instructions     Advance Diet as Tolerated      May advance diet as tolerated while in hospital.    Diet:      Diet Texture / Consistency: Regular       without problems.  Patient will be discharged home.    Condition on Discharge:  Stable    Vital Signs  Temp:  [94.5 °F (34.7 °C)-98.4 °F (36.9 °C)] 97.4 °F (36.3 °C)  Heart Rate:  [49-84] 84  Resp:  [12-20] 16  BP: (136-193)/() 148/75    LABS:   Admission on 09/12/2022   Component Date Value Ref Range Status   • ABO Type 09/12/2022 A   Final   • RH type 09/12/2022 Positive   Final   • Antibody Screen 09/12/2022 Negative   Final   • T&S Expiration Date 09/12/2022 9/15/2022 11:59:59 PM   Final   • Hemoglobin 09/13/2022 11.6 (A) 12.0 - 15.9 g/dL Final   • Hematocrit 09/13/2022 35.4  34.0 - 46.6 % Final       No results found.        Discharge Medications     Discharge Medications      New Medications      Instructions Start Date   aspirin 81 MG EC tablet   Take 1 tablet by mouth twice daily x 14 days; then take 1 tablet by mouth daily x 28 days   Start Date: September 12, 2022     HYDROcodone-acetaminophen 7.5-325 MG per tablet  Commonly known as: NORCO   Take 1-2 tablets by  mouth every 4-6 hours as needed for pain.  Take 2 only when in severe pain.      meloxicam 15 MG tablet  Commonly known as: MOBIC   Take 1 tablet by mouth Daily for 14 days      ondansetron 4 MG tablet  Commonly known as: Zofran   4 mg, Oral, Every 8 Hours PRN      pantoprazole 40 MG EC tablet  Commonly known as: PROTONIX   40 mg, Oral, Daily      polyethylene glycol 17 GM/SCOOP powder  Commonly known as: MIRALAX   Dissolve 17 g (1 capful) in liquid and drink by mouth 2 (Two) Times a Day for 7 days         Stop These Medications    Chlorhexidine Gluconate 2 % pads     TYLENOL ARTHRITIS PAIN PO            Activity at Discharge:   Activity Instructions     Discharge Activity      Dr Reg Parisi  [address], [address]  [place] [address]  [phone]      Discharge Information (HIP REPLACEMENT)    The first 2-3 days after surgery your pain will likely be diminished due to medications that were given to you during surgery. It is very important to follow a few simple instructions to continue with good pain control after arriving home.    Activity control :  DON'T OVERDO IT, small amounts of activity on a frequent basis. You are encouraged to get up and move around  for  short periods but do not engage in any prolonged walking, standing or activity until cleared by your physical therapist. You may walk short distances frequently.  You will be notified while in hospital if you have any specific hip precautions  Ice - you should ice the hip as much as possible over the first week or two.  Placing a clean hand towel or pillowcase between the icepack and skin will allow you to ice for extended periods.   Pain Medication - Take some pain medication (1-2 tablets) on a regular schedule (every 4-6 hours) for the first 72 hours after arriving home. This is important to keep the pain under control as the operative medication begins to wear off. Make sure to have food in your stomach when taking the medication. If you  develop any nausea with the pain medication, try taking Zofran 30 minutes before taking the pain pills. After the first 72 hours you can take the medication as needed based on your pain.  REMEMBER - PAIN MEDICATION WORKS BETTER AT PREVENTING PAIN THAN IT DOES IN CATCHING UP TO PAIN ONCE IT INCREASES.  Physical therapy:  Follow the instructions of your physical therapist.  You may put full weight on your leg unless instructed otherwise.  If you have hip precautions, this will be discussed with you in the hospital. Continue to follow any hip precautions / restrictions until your follow-up appointment.   If you have been told that you have no hip precautions then you may sit / lay/ bend in any position within reason.  If you lay on your side then you should place a pillow between your legs for the first 2 weeks.  For all patients - “listen to your hip” - meaning don't force your hip into an uncomfortable position.    Showering :   The waterproof dressing will allow you to shower as soon as you get home.    The dressing should be left in place.  After 7 days the suction unit will stop functioning and at that time you may disconnect the suction tube.    If the dressing becomes disloged or saturated it should be changed.  If you have a home health nurse or therapist they can be contacted to assist with dressing change or repair.  Please refer to the CLAUDY information sheet if you have any questions about the dressing.  You may also call the CLAUDY dressing hotline for questions related to the dressing (1-860.623.9824). If there still other problems or questions related to the dressing despite these measures then you can contact Layne at our office [phone]    Driving : No driving during the first 2 weeks post surgery. After the 2 week visit, Kiersten Jin, or Radames will determine when you’re ready to drive.    Blood Clot (DVT) Prevention:  Keep legs elevated when possible to limit swelling  Perform “calf pump”  exercises regularly to encourage blood flow through the calf veins.  Most patients will be discharged on asprin 81mg (full strength) twice daily for 2 weeks, then once daily for four weeks. Some high risk patients may require Coumadin, Xarelto, or other blood thinners.    Follow-Up Visit: After arriving home, please call Dr Parisi’s office ([phone]) to arrange your follow-up appointment. This visit will be approximately 2 weeks post-op.     Your Implant: Your hip implant is made of the finest materials available. It is made by [employer]. For more info visit "[employer]com. There are four components:  Polarstem titanium femoral stem  R3 titanium acetabular cup  Oxidized zirconium femoral head (Oxinium ™)  Crosslinked polyethylene acetabular insert    Patient May Shower; No Tub Baths, Pools or Hot Tubs      Weight Bearing As Tolerated            Discharge Instructions:   1)  Patient is to continue with physical therapy exercises daily and continue working with the physical therapist as ordered.  2)  Follow Posterior hip precautions.   3)  Patient may weight bear as tolerated.   4)  Apply ice regularly. You may ice for long periods of time as long as ice is not directly on the skin. Patient instructed on frequent calf pumping exercises.  Patient also instructed on incentive spirometer during hospitalization and encouraged to continue to use at home regularly.   5)  The dressing should be left in place. If waterproof dressing is intact the patient may shower immediately following discharge. If the dressing becomes disloged or saturated it should be changed. Please refer to the CLAUDY information sheet if you have any questions about the dressing.  If you have a home health nurse or therapist they can be contacted to assist with dressing change or repair. You may also call the CLAUDY dressing hotline for questions related to the dressing (1-478.107.3115). If there are still other problems or  questions related to the dressing despite these measures then you can contact Layne at our office [phone].   6)  Follow up appointment in 2 weeks - patient to call the office at [phone] to schedule. 7)  Patient will be discharged on aspirin 81mg BID x weeks, then daily x 4weeks    Complete Discharge Diagnosis:    Patient Active Problem List   Diagnosis   • Arteriovenous malformation (AVM)   • BASILIO (dyspnea on exertion)   • Arterio-venous malformation   • Primary localized osteoarthritis of left hip   • Greater trochanteric bursitis of left hip           Follow-up Appointments  Future Appointments   Date Time Provider Department Center   9/27/2022  1:10 PM Reg Parisi MD [facility] [address] [place], [place]  09/13/22  08:02 EDT

## 2022-09-13 NOTE — PROGRESS NOTES
[facility] will follow post hospital as ordered. Patient agreeable to service. Contact information confirmed. Patient is not currently receiving home health from any other agency.

## 2022-09-13 NOTE — PROGRESS NOTES
Continued Stay Note  [facility]     Patient Name: Rylee Pennington  MRN: [id number]  Today's Date: 9/13/2022    Admit Date: 9/12/2022     Discharge Plan     Row Name 09/13/22 1035       Plan    Plan [facility]    Patient/Family in Agreement with Plan yes    Plan Comments Spoke with pt, verified correct information on facesheet and explained the role of CCP. Pt would like to d/c home with [facility], referral sent in Epic to [facility]. Plan will be to d/c home with [facility] and family support. No other needs identified.    Final Discharge Disposition Code 06 - home with home health care    Final Note D/c home with [facility]               Discharge Codes    No documentation.               Expected Discharge Date and Time     Expected Discharge Date Expected Discharge Time    Sep 13, 2022             Dian Shearer RN

## 2022-09-13 NOTE — DISCHARGE PLACEMENT REQUEST
"Nereida Mathur (62 y.o. Female)             Date of Birth   1960    Social Security Number       Address   [address] [id number]    Home Phone   [phone]    MRN   [id number]       Anabaptism   Other    Marital Status                               Admission Date   9/12/22    Admission Type   Elective    Admitting Provider   Reg Parisi MD    Attending Provider   Reg Parisi MD    Department, Room/Bed   [address], P782/1       Discharge Date       [place] [facility] [place]                               Attending Provider: Reg Parisi MD    Allergies: No Known Allergies    Isolation: None   Infection: None   Code Status: Prior   Advance Care Planning Activity    Ht: 162.6 cm (64.02\")   Wt: 114 kg (251 lb)    Admission Cmt: None   Principal Problem: Primary localized osteoarthritis of left hip [M16.12]                 Active Insurance as of 9/12/2022     Primary Coverage     Payor Plan Insurance Group Employer/Plan Group    [facility] BLUE [facility] [employer] [facility] [employer] EMPLOYEE [id number]     Payor Plan Address Payor Plan Phone Number Payor Plan Fax Number Effective Dates    [address] [phone]  1/1/2022 - None Entered    [facility] [id number]       Subscriber Name Subscriber Birth Date Member ID       NEREIDA MATHUR 1960 [id number]                 Emergency Contacts      (Rel.) Home Phone Work Phone Mobile Phone    Clau sampson (Daughter) [phone] -- [phone]    HELEN HUGGINS (Son) -- -- [phone]          "

## 2022-09-13 NOTE — PLAN OF CARE
Goal Outcome Evaluation:   VSS- Pt alert and oriented. POD 1 of a left total hip. Dressing c/d/I. Neurovascular checks wdl. Pt eating, drinking, ambulating and voiding well. Pain well controlled. Pt to discharge home today with family.

## 2022-09-14 ENCOUNTER — HOME CARE VISIT (OUTPATIENT)
Dept: HOME HEALTH SERVICES | Facility: HOME HEALTHCARE | Age: 62
End: 2022-09-14

## 2022-09-14 VITALS
SYSTOLIC BLOOD PRESSURE: 118 MMHG | DIASTOLIC BLOOD PRESSURE: 76 MMHG | RESPIRATION RATE: 18 BRPM | TEMPERATURE: 97.8 F | HEART RATE: 83 BPM | OXYGEN SATURATION: 18 %

## 2022-09-14 PROCEDURE — G0151 HHCP-SERV OF PT,EA 15 MIN: HCPCS

## 2022-09-14 NOTE — HOME HEALTH
"____________________________________  PHYSICAL THERAPY POSTERIOR HIP ARTHROPLASTY EVALUATION    REASON FOR REFERRAL:  62 year old female admitted to [facility] on 9/12/22  for elective LEFT TOTAL HIP ARTHROPLATY.  She was discharged home on 9/13/22 & referred for home health PT services for surgical aftercare of (L) JESS, resulting in decreased range of motion and strength, impeding functional mobility and gait activities.    Pt states she has been sleeping in a recliner chair for several years.  She is very comfortable & has no intentions of sleeping in a bed.  Pt reports her (R) hip is very arthritic & needs to be replaced ASAP.    PERTINENT PAST MEDICAL HISTORY:    Anxiety   AVM (arteriovenous malformation)    (R) LE Fracture, tibia and fibula 1995   GERD (gastroesophageal reflux disease)    History of anemia    History of kidney stones    Hypertension    Shortness of breath on exertion    Sleep apnea- NO C-PAP IN USE    PERTINENT PAST SURGICAL HISTORY:    APPENDECTOMY  GASTRIC BANDING WITH REMOVAL AT A LATER DATE  HYSTERCTOMY    CAREGIVER:  Daughter- Clau Mcgraw    HOME ENVIRONMENT:  Lives alone with 2 small dogs in single story home with 3 short steps without rail to enter.  Daughter lives 1 mile away & is staying temporarily with patient.  Pt sleeps in a recliner.    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  Indepedent with all transfers & ambulation without asst device.  Driving.    PATIENT GOAL FOR THIS EPISODE OF CARE:  \"To get back to being fully independent\"    MENTAL STATUS:  Alert and oriented x 4    EDEMA: Moderate Left post- lateral hip/thigh.  Calf is soft & non-tender.  Rena's negative.    WOUND / SKIN CONDITION:  Scant drainage on CLAUDY dressing covering (L) posterior hip incision     SIGNS SYMPTOMS OF INFECTION:   None    KNOWLEDGE OF POSTERIOR HIP PRECAUTIONS:  Fair    POSTURE:  No gross deformities    MUSCLE TONE/COORDINATION:   WNL        "                          TINETTI SCORE:   15/28  (TINETTI FALL RISK SCORING: Under 19= High  19-24= Moderate  25 & up= Low)    TIMED UP AND GO score:  35 seconds- indicates    MOBILITY  (TUG Mobility: High > 10 sec/Typical 10-19/Slower 20-29/Diminished>30)    ASSESSMENT:  Pt s/p (L) posterior JESS.  She is functioning fairly well with FWW, however is unable to safely utilize walker in bathroom due to very small space.  Pt requires skilled PT services to address deficits in strength, functional mobility & gt activities, while abiding by posterior hip precautions..    PLAN FOR NEXT VISIT:  Review posterior hip precautions, pain/edema management, progress HEP, bed mobility/transfer training, progress gait training

## 2022-09-16 ENCOUNTER — HOME CARE VISIT (OUTPATIENT)
Dept: HOME HEALTH SERVICES | Facility: HOME HEALTHCARE | Age: 62
End: 2022-09-16

## 2022-09-16 VITALS
RESPIRATION RATE: 18 BRPM | HEART RATE: 74 BPM | OXYGEN SATURATION: 96 % | TEMPERATURE: 97.7 F | SYSTOLIC BLOOD PRESSURE: 162 MMHG | DIASTOLIC BLOOD PRESSURE: 79 MMHG

## 2022-09-16 PROCEDURE — G0151 HHCP-SERV OF PT,EA 15 MIN: HCPCS

## 2022-09-16 NOTE — HOME HEALTH
"Subjective: \"I have been doing laps in the house. \" per patient    no new med changes  no recent falls    Assessment: patient able to progress to tub shower transfer today and with reports of good pain medicine.    Plan for next visit: gait training outside as able,stair training, progress standing ther ex as able"

## 2022-09-19 ENCOUNTER — TELEPHONE (OUTPATIENT)
Dept: ORTHOPEDIC SURGERY | Facility: HOSPITAL | Age: 62
End: 2022-09-19

## 2022-09-19 NOTE — TELEPHONE ENCOUNTER
Called and spoke with Ms. Pennington to see how she is doing as she is 1 week SP JESS. She said she is doing fine. Pain is controlled with the medications. PT is coming out to see her, she is doing her exercises and walking. She asked when she was going to be able to go to a cane. Explained that PT would help her with that transition when she was ready but she could talk with them about that at her appt tomorrow. She voiced understanding. She said the dressing remains CDI. Green light is still blinking. We went over the CLAUDY instructions for after the battery pack no longer works. She voiced understanding. She asked if she was allowed to go out to the store. Told her as long as she had HH she could go to necessary appointments but she needed to watch her distance as well. She voiced understanding. Ms. Pennington doesn't have any other questions/concerns for me at this time. She was given my contact information should she need anything.

## 2022-09-20 ENCOUNTER — HOME CARE VISIT (OUTPATIENT)
Dept: HOME HEALTH SERVICES | Facility: HOME HEALTHCARE | Age: 62
End: 2022-09-20

## 2022-09-20 ENCOUNTER — TELEPHONE (OUTPATIENT)
Dept: ORTHOPEDIC SURGERY | Facility: CLINIC | Age: 62
End: 2022-09-20

## 2022-09-20 VITALS
DIASTOLIC BLOOD PRESSURE: 79 MMHG | RESPIRATION RATE: 18 BRPM | TEMPERATURE: 97.6 F | OXYGEN SATURATION: 95 % | SYSTOLIC BLOOD PRESSURE: 140 MMHG | HEART RATE: 67 BPM

## 2022-09-20 PROCEDURE — G0151 HHCP-SERV OF PT,EA 15 MIN: HCPCS

## 2022-09-20 NOTE — TELEPHONE ENCOUNTER
Caller: RODRIGUE    Relationship: [facility] NURSE    Best call back number:     Who are you requesting to speak with (clinical staff, provider,  specific staff member): CLINICAL    What was the call regarding: NEED PRESCRIPTION FOR OUTPATIENT PHYSICAL THERAPY SENT TO [facility] FOR WEEK OF 26TH.  FAX NUMBER  [phone] FOR LEFT TOTAL HIP REPLACEMENT     Do you require a callback: YES

## 2022-09-20 NOTE — HOME HEALTH
"Subjective: \"I can't wait until I can be on my own.\" per patient    no new med changes  no recent falls    Assessment: patient able to progress to outside amb today and also to cane amb with no issues.    Plan for next visit: gait training, ther ex, progress amb to outisde amb with cane, progress HEP"

## 2022-09-23 ENCOUNTER — HOME CARE VISIT (OUTPATIENT)
Dept: HOME HEALTH SERVICES | Facility: HOME HEALTHCARE | Age: 62
End: 2022-09-23

## 2022-09-23 VITALS
SYSTOLIC BLOOD PRESSURE: 132 MMHG | TEMPERATURE: 97.1 F | RESPIRATION RATE: 18 BRPM | DIASTOLIC BLOOD PRESSURE: 80 MMHG | OXYGEN SATURATION: 99 % | HEART RATE: 94 BPM

## 2022-09-23 PROCEDURE — G0151 HHCP-SERV OF PT,EA 15 MIN: HCPCS

## 2022-09-23 NOTE — HOME HEALTH
"Subjective: \"Things are going pretty well. I only need to take the pain meds at night.\" per patient    no new med changes  no recent falls    Assessment: patient reports being able to take a shower with no issues, using cane for short distances during the day    Plan for next visit: probable PT discharge, gait training outside as able, cane training, progress HEP"

## 2022-09-26 ENCOUNTER — HOME CARE VISIT (OUTPATIENT)
Dept: HOME HEALTH SERVICES | Facility: HOME HEALTHCARE | Age: 62
End: 2022-09-26

## 2022-09-26 VITALS
TEMPERATURE: 97.4 F | HEART RATE: 85 BPM | SYSTOLIC BLOOD PRESSURE: 157 MMHG | DIASTOLIC BLOOD PRESSURE: 68 MMHG | RESPIRATION RATE: 18 BRPM | OXYGEN SATURATION: 97 %

## 2022-09-26 PROCEDURE — G0151 HHCP-SERV OF PT,EA 15 MIN: HCPCS

## 2022-09-26 NOTE — HOME HEALTH
Patient was seen for agency discharge due to PT goals met. Patient was seen for a total of 5 PT visits for evaluation, gait training, ther ex, HEP, home safety, and fall prevention. Currently patient is IND with amb x 300 ft with FWW/SBQC, IND with HEP, IND with fall prevention, and IND with pain/edema management. Patient agrees with discharge from home health and is scheduled to begin outpatient PT 9/28/22.

## 2022-09-27 ENCOUNTER — OFFICE VISIT (OUTPATIENT)
Dept: ORTHOPEDIC SURGERY | Facility: CLINIC | Age: 62
End: 2022-09-27

## 2022-09-27 VITALS — TEMPERATURE: 97.1 F | BODY MASS INDEX: 42.65 KG/M2 | WEIGHT: 249.8 LBS | HEIGHT: 64 IN

## 2022-09-27 DIAGNOSIS — R52 PAIN: Primary | ICD-10-CM

## 2022-09-27 DIAGNOSIS — M16.11 PRIMARY OSTEOARTHRITIS OF RIGHT HIP: ICD-10-CM

## 2022-09-27 DIAGNOSIS — Z96.642 STATUS POST LEFT HIP REPLACEMENT: ICD-10-CM

## 2022-09-27 PROCEDURE — 73502 X-RAY EXAM HIP UNI 2-3 VIEWS: CPT | Performed by: ORTHOPAEDIC SURGERY

## 2022-09-27 PROCEDURE — 99214 OFFICE O/P EST MOD 30 MIN: CPT | Performed by: ORTHOPAEDIC SURGERY

## 2022-09-27 RX ORDER — POVIDONE-IODINE 10 MG/ML
SOLUTION TOPICAL ONCE
Status: CANCELLED | OUTPATIENT
Start: 2022-12-21 | End: 2022-09-27

## 2022-09-27 RX ORDER — CEFAZOLIN SODIUM IN 0.9 % NACL 3 G/100 ML
3 INTRAVENOUS SOLUTION, PIGGYBACK (ML) INTRAVENOUS ONCE
Status: CANCELLED | OUTPATIENT
Start: 2022-12-21 | End: 2022-09-27

## 2022-09-27 RX ORDER — PREGABALIN 75 MG/1
150 CAPSULE ORAL ONCE
Status: CANCELLED | OUTPATIENT
Start: 2022-12-21 | End: 2022-09-27

## 2022-09-27 RX ORDER — CHLORHEXIDINE GLUCONATE 500 MG/1
CLOTH TOPICAL 2 TIMES DAILY
Status: CANCELLED | OUTPATIENT
Start: 2022-09-27

## 2022-09-27 RX ORDER — MELOXICAM 15 MG/1
15 TABLET ORAL ONCE
Status: CANCELLED | OUTPATIENT
Start: 2022-12-21 | End: 2022-09-27

## 2022-09-27 NOTE — PROGRESS NOTES
Rylee GOLDBERG [facility] : 1960 MRN: [id number] DATE: 2022    DIAGNOSIS: 2 week follow up left total hip , right hip severe pain    SUBJECTIVE:Patient returns today for 2 week follow up of left total hip replacement. Patient reports doing well with no unusual complaints. Appears to be progressing appropriately..  She still on a walker.  She states she has considerably more pain within the right hip than the left hip at this point.  She has a stabbing aching pain within the groin and that has not improved with anti-inflammatories and even pain medication that she has taken recently for the left hip surgery.    OBJECTIVE:   Exam:. The incision is healing appropriately. No sign of infection. Range of motion is progressing as expected. The calf is soft and nontender with a negative Homans sign.    Hip:  right    LEG ALIGNMENT:     Neutral        LEG LENGTH DISCREPANCY   :    left longer by 0.5 cm    GAIT:     Antalgic    SKIN:     No abnormality    RANGE OF MOTION:     Limited by joint irritability    STRENGTH:     Limited by joint irratibility    DISTAL PULSES:    Paplable    DISTAL SENSATION :   Intact    LYMPHATICS:     No   lymphadenopathy    OTHER:          +   Stinchfeld test      -    log roll      -   Tenderness to palpation trochanteric bursa       DIAGNOSTIC STUDIES  Xrays: 2 views of the left hip (AP pelvis and lateral left hip) were ordered and reviewed for evaluation of recent hip replacement. They demonstrate a well positioned, well aligned hip replacement without complicating factors noted. In comparison with previous films there has been interval implant placement.  There is end-stage arthritis of the right hip with bone-on-bone articulation.  In comparison with previous films are has been slight progression    ASSESSMENT: 2 week status post left hip replacement.  Well, severe end-stage right hip osteoarthritis which is more limiting currently than the recent left hip replacement.    PLAN: 1) Staples  removed and steri strips applied   2) PT exercises   3) Discontinue JENNIFER hose   4) Continue ice PRN   5) WBAT   6) aspirin 81 mg orally every day for 1 month   7) Follow up in 6 weeks with repeat Xrays of left hip (2views)    Continuation of conservative management vs. right JESS discussed.  The patient wishes to proceed with total hip replacement.  At this point the patient has failed the full gamut of conservative treatment and stating complete understanding of the risks/benefits/ anternatives wishes to proceed with surgical treatment.    Risk and benefits of surgery were reviewed.  Including, but not limited to, blood clots, anesthesia risk, infection, leg length discrepancy, fracture, skin/leg numbness, failure of the implant, need for future surgeries, continued pain, hematoma, need for transfusion, and death, among others.  The patient understands and wishes to proceed.     The spectrum of treatment options were discussed with the patient in detail including both the nonoperative and operative treatment modalities and their respective risks and benefits.  After thorough discussion, the patient has elected to undergo surgical treatment.  The details of the surgical procedure were explained including the location of probable incisions and a description of the likely implants to be used.  Models and diagrams were used as educational resources. The patient understands the likely convalescence after surgery, as well as the rehabilitation required.  We thoroughly discussed the risks, benefits, and alternatives to surgery.  The risks include but are not limited to the risk of infection, joint stiffness, blood clots (including DVT and/or pulmonary embolus along with the risk of death), neurologic and/or vascular injury, fracture, dislocation, nonunion, malunion, need for further surgery including hardware failure requiring revision, and continued pain.  It was explained that if tissue has been repaired or reconstructed,  there is also a chance of failure which may require further management.  Following the completion of the discussion, the patient expressed understanding of this planned course of care, all their questions were answered and consent will be obtained preoperatively.    Operative Plan: Right posterior approach Total Hip Replacement 23 HR STAY  Reg Parisi MD  9/27/2022

## 2022-10-04 PROBLEM — M16.11 PRIMARY OSTEOARTHRITIS OF RIGHT HIP: Status: ACTIVE | Noted: 2022-10-04

## 2022-11-08 ENCOUNTER — OFFICE VISIT (OUTPATIENT)
Dept: ORTHOPEDIC SURGERY | Facility: CLINIC | Age: 62
End: 2022-11-08

## 2022-11-08 VITALS — HEIGHT: 64 IN | WEIGHT: 245 LBS | BODY MASS INDEX: 41.83 KG/M2 | TEMPERATURE: 97.6 F

## 2022-11-08 DIAGNOSIS — R52 PAIN: Primary | ICD-10-CM

## 2022-11-08 DIAGNOSIS — Z96.642 STATUS POST LEFT HIP REPLACEMENT: ICD-10-CM

## 2022-11-08 PROCEDURE — 99024 POSTOP FOLLOW-UP VISIT: CPT | Performed by: NURSE PRACTITIONER

## 2022-11-08 PROCEDURE — 73502 X-RAY EXAM HIP UNI 2-3 VIEWS: CPT | Performed by: NURSE PRACTITIONER

## 2022-11-08 NOTE — PROGRESS NOTES
Rylee Pennington : 1960 MRN: [id number] DATE: 2022    DIAGNOSIS: 8 week follow up left total hip (posterior)    SUBJECTIVE:Patient returns today for 8 week follow up of left total hip replacement. Patient reports doing well with no unusual complaints. Appears to be progressing appropriately and is off a cane.  Patient reports that her pain is very tolerable within her left hip.  She states that she is finished with physical therapy exercises and is doing home exercises.  She states that her right hip is progressively worsened and she is proceeding forward with a total hip replacement next month in regards to her right hip.  She denies any signs or symptoms of infection, and she is without any other significant complaints today.    OBJECTIVE:   Exam:. The incision is healed. No sign of infection. Range of motion is progressing as expected. The calf is soft and nontender with a negative Homans sign. Strength progressing    DIAGNOSTIC STUDIES  Xrays: 2 views of the left hip (AP pelvis and lateral left hip) were ordered and reviewed for evaluation of recent hip replacement. They demonstrate a well positioned, well aligned hip replacement without complicating factors noted. In comparison with previous films there has been interval implant placement.    ASSESSMENT: 8 week status post left hip replacement.    PLAN: 1) Activity as tolerated   2) Continue hip strengthening exercises    3) Follow up 1 year post-op with repeat Xrays of left hip (2views AP Pelvis      and lateral left hip)    MATHEW Rahman  2022

## 2022-11-28 ENCOUNTER — TELEPHONE (OUTPATIENT)
Dept: ORTHOPEDIC SURGERY | Facility: CLINIC | Age: 62
End: 2022-11-28

## 2022-12-08 ENCOUNTER — PRE-ADMISSION TESTING (OUTPATIENT)
Dept: PREADMISSION TESTING | Facility: HOSPITAL | Age: 62
End: 2022-12-08

## 2022-12-08 ENCOUNTER — OFFICE VISIT (OUTPATIENT)
Dept: ORTHOPEDIC SURGERY | Facility: CLINIC | Age: 62
End: 2022-12-08

## 2022-12-08 VITALS
SYSTOLIC BLOOD PRESSURE: 157 MMHG | HEART RATE: 68 BPM | HEIGHT: 64 IN | DIASTOLIC BLOOD PRESSURE: 82 MMHG | RESPIRATION RATE: 16 BRPM | WEIGHT: 239.8 LBS | BODY MASS INDEX: 40.94 KG/M2 | TEMPERATURE: 98.1 F | OXYGEN SATURATION: 98 %

## 2022-12-08 VITALS — TEMPERATURE: 97.6 F | BODY MASS INDEX: 40.97 KG/M2 | HEIGHT: 64 IN | WEIGHT: 240 LBS

## 2022-12-08 DIAGNOSIS — R52 PAIN: Primary | ICD-10-CM

## 2022-12-08 LAB
ANION GAP SERPL CALCULATED.3IONS-SCNC: 12.2 MMOL/L (ref 5–15)
BUN SERPL-MCNC: 18 MG/DL (ref 8–23)
BUN/CREAT SERPL: 26.5 (ref 7–25)
CALCIUM SPEC-SCNC: 9 MG/DL (ref 8.6–10.5)
CHLORIDE SERPL-SCNC: 100 MMOL/L (ref 98–107)
CO2 SERPL-SCNC: 26.8 MMOL/L (ref 22–29)
CREAT SERPL-MCNC: 0.68 MG/DL (ref 0.57–1)
DEPRECATED RDW RBC AUTO: 42.8 FL (ref 37–54)
EGFRCR SERPLBLD CKD-EPI 2021: 98 ML/MIN/1.73
ERYTHROCYTE [DISTWIDTH] IN BLOOD BY AUTOMATED COUNT: 13.4 % (ref 12.3–15.4)
GLUCOSE SERPL-MCNC: 90 MG/DL (ref 65–99)
HCT VFR BLD AUTO: 37.8 % (ref 34–46.6)
HGB BLD-MCNC: 12.6 G/DL (ref 12–15.9)
MCH RBC QN AUTO: 29.4 PG (ref 26.6–33)
MCHC RBC AUTO-ENTMCNC: 33.3 G/DL (ref 31.5–35.7)
MCV RBC AUTO: 88.1 FL (ref 79–97)
PLATELET # BLD AUTO: 194 10*3/MM3 (ref 140–450)
PMV BLD AUTO: 10 FL (ref 6–12)
POTASSIUM SERPL-SCNC: 3.9 MMOL/L (ref 3.5–5.2)
RBC # BLD AUTO: 4.29 10*6/MM3 (ref 3.77–5.28)
SODIUM SERPL-SCNC: 139 MMOL/L (ref 136–145)
WBC NRBC COR # BLD: 6.27 10*3/MM3 (ref 3.4–10.8)

## 2022-12-08 PROCEDURE — 80048 BASIC METABOLIC PNL TOTAL CA: CPT

## 2022-12-08 PROCEDURE — 36415 COLL VENOUS BLD VENIPUNCTURE: CPT

## 2022-12-08 PROCEDURE — 73502 X-RAY EXAM HIP UNI 2-3 VIEWS: CPT | Performed by: NURSE PRACTITIONER

## 2022-12-08 PROCEDURE — S0260 H&P FOR SURGERY: HCPCS | Performed by: NURSE PRACTITIONER

## 2022-12-08 PROCEDURE — 85027 COMPLETE CBC AUTOMATED: CPT

## 2022-12-08 RX ORDER — CHLORHEXIDINE GLUCONATE 500 MG/1
CLOTH TOPICAL
COMMUNITY
End: 2022-12-22 | Stop reason: HOSPADM

## 2022-12-08 ASSESSMENT — HOOS JR
HOOS JR SCORE: 52.465
HOOS JR SCORE: 14

## 2022-12-08 NOTE — DISCHARGE INSTRUCTIONS
Take the following medications the morning of surgery:    NONE    ARRIVE AT 1100.      If you are on prescription narcotic pain medication to control your pain you may also take that medication the morning of surgery.    General Instructions:  Do not eat solid food after midnight the night before surgery.  You may drink clear liquids day of surgery but must stop at least one hour before your hospital arrival time.  It is beneficial for you to have a clear drink that contains carbohydrates the day of surgery.  We suggest a 12 to 20 ounce bottle of Gatorade or Powerade for non-diabetic patients or a 12 to 20 ounce bottle of G2 or Powerade Zero for diabetic patients. (Pediatric patients, are not advised to drink a 12 to 20 ounce carbohydrate drink)    Clear liquids are liquids you can see through.  Nothing red in color.     Plain water                               Sports drinks  Sodas                                   Gelatin (Jell-O)  Fruit juices without pulp such as white grape juice and apple juice  Popsicles that contain no fruit or yogurt  Tea or coffee (no cream or milk added)  Gatorade / Powerade  G2 / Powerade Zero    Infants may have breast milk up to four hours before surgery.  Infants drinking formula may drink formula up to six hours before surgery.   Patients who avoid smoking, chewing tobacco and alcohol for 4 weeks prior to surgery have a reduced risk of post-operative complications.  Quit smoking as many days before surgery as you can.  Do not smoke, use chewing tobacco or drink alcohol the day of surgery.   If applicable bring your C-PAP/ BI-PAP machine.  Bring any papers given to you in the doctor’s office.  Wear clean comfortable clothes.  Do not wear contact lenses, false eyelashes or make-up.  Bring a case for your glasses.   Bring crutches or walker if applicable.  Remove all piercings.  Leave jewelry and any other valuables at home.  Hair extensions with metal clips must be removed prior to  surgery.  The Pre-Admission Testing nurse will instruct you to bring medications if unable to obtain an accurate list in Pre-Admission Testing.        If you were given a blood bank ID arm band remember to bring it with you the day of surgery.    Preventing a Surgical Site Infection:  For 2 to 3 days before surgery, avoid shaving with a razor because the razor can irritate skin and make it easier to develop an infection.    Any areas of open skin can increase the risk of a post-operative wound infection by allowing bacteria to enter and travel throughout the body.  Notify your surgeon if you have any skin wounds / rashes even if it is not near the expected surgical site.  The area will need assessed to determine if surgery should be delayed until it is healed.  The night prior to surgery shower using a fresh bar of anti-bacterial soap (such as Dial) and clean washcloth.  Sleep in a clean bed with clean clothing.  Do not allow pets to sleep with you.  Shower on the morning of surgery using a fresh bar of anti-bacterial soap (such as Dial) and clean washcloth.  Dry with a clean towel and dress in clean clothing.  Ask your surgeon if you will be receiving antibiotics prior to surgery.  Make sure you, your family, and all healthcare providers clean their hands with soap and water or an alcohol based hand  before caring for you or your wound.    Day of surgery:  Your arrival time is approximately two hours before your scheduled surgery time.  Upon arrival, a Pre-op nurse and Anesthesiologist will review your health history, obtain vital signs, and answer questions you may have.  The only belongings needed at this time will be a list of your home medications and if applicable your C-PAP/BI-PAP machine.  A Pre-op nurse will start an IV and you may receive medication in preparation for surgery, including something to help you relax.     Please be aware that surgery does come with discomfort.  We want to make every  effort to control your discomfort so please discuss any uncontrolled symptoms with your nurse.   Your doctor will most likely have prescribed pain medications.      If you are going home after surgery you will receive individualized written care instructions before being discharged.  A responsible adult must drive you to and from the hospital on the day of your surgery and stay with you for 24 hours.  Discharge prescriptions can be filled by the hospital pharmacy during regular pharmacy hours.  If you are having surgery late in the day/evening your prescription may be e-prescribed to your pharmacy.  Please verify your pharmacy hours or chose a 24 hour pharmacy to avoid not having access to your prescription because your pharmacy has closed for the day.    If you are staying overnight following surgery, you will be transported to your hospital room following the recovery period.  [facility] has all private rooms.    If you have any questions please call Pre-Admission Testing at [phone].  Deductibles and co-payments are collected on the day of service. Please be prepared to pay the required co-pay, deductible or deposit on the day of service as defined by your plan.    Call your surgeon immediately if you experience any of the following symptoms:  Sore Throat  Shortness of Breath or difficulty breathing  Cough  Chills  Body soreness or muscle pain  Headache  Fever  New loss of taste or smell  Do not arrive for your surgery ill.  Your procedure will need to be rescheduled to another time.  You will need to call your physician before the day of surgery to avoid any unnecessary exposure to hospital staff as well as other patients.    CHLORHEXIDINE CLOTH INSTRUCTIONS  The morning of surgery follow these instructions using the Chlorhexidine cloths you've been given.  These steps reduce bacteria on the body.  Do not use the cloths near your eyes, ears mouth, genitalia or on open wounds.  Throw the  cloths away after use but do not try to flush them down a toilet.      Open and remove one cloth at a time from the package.    Leave the cloth unfolded and begin the bathing.  Massage the skin with the cloths using gentle pressure to remove bacteria.  Do not scrub harshly.   Follow the steps below with one 2% CHG cloth per area (6 total cloths).  One cloth for neck, shoulders and chest.  One cloth for both arms, hands, fingers and underarms (do underarms last).  One cloth for the abdomen followed by groin.  One cloth for right leg and foot including between the toes.  One cloth for left leg and foot including between the toes.  The last cloth is to be used for the back of the neck, back and buttocks.    Allow the CHG to air dry 3 minutes on the skin which will give it time to work and decrease the chance of irritation.  The skin may feel sticky until it is dry.  Do not rinse with water or any other liquid or you will lose the beneficial effects of the CHG.  If mild skin irritation occurs, do rinse the skin to remove the CHG.  Report this to the nurse at time of admission.  Do not apply lotions, creams, ointments, deodorants or perfumes after using the clothes. Dress in clean clothes before coming to the hospital.

## 2022-12-08 NOTE — H&P (VIEW-ONLY)
Patient: Rylee Pennington    Date of Admission: 12/21/2022    YOB: 1960    Medical Record Number: [id number]    Admitting Physician: Dr. Reg Parisi    Reason for Admission: End Stage Right Hip OA    History of Present Illness: 62 y.o. female presents with severe end stage hip osteoarthritis which has not been responsive to the full compliment of conservative measures. Despite conservative attempts, there is still severe, constant activity limiting hip pain. Given the severity of the pain, the patient has elected to proceed with hip replacement.    Allergies: No Known Allergies      Current Medications:  Home Medications:    Current Outpatient Medications on File Prior to Visit   Medication Sig   • Acetaminophen (TYLENOL PO) Take  by mouth As Needed.   • [DISCONTINUED] HYDROcodone-acetaminophen (NORCO) 7.5-325 MG per tablet Take 1-2 tablets by mouth every 4-6 hours as needed for pain.  Take 2 only when in severe pain.   • [DISCONTINUED] ondansetron (Zofran) 4 MG tablet Take 1 tablet by mouth Every 8 (Eight) Hours As Needed for Nausea or Vomiting for up to 10 doses.     Current Facility-Administered Medications on File Prior to Visit   Medication   • Chlorhexidine Gluconate Cloth 2 % pads     PRN Meds:.    PMH:  Past Medical History:   Diagnosis Date   • Abrasion of left lower extremity     HAS APPT WITH DR PARISI AFTER PAT APPT TO DISCUSS ABRASION   • Anxiety     TOOK MEDS IN THE PAST-NONE LAST TWO YRS   • Arthritis    • AVM (arteriovenous malformation)    • Fracture, fibula    • Fracture, tibia and fibula    • GERD (gastroesophageal reflux disease)    • Hip pain    • History of anemia    • History of kidney stones    • Hypertension     HAS BEEN TREATED IN PAST WITH MEDS   • Left hip pain    • Shortness of breath on exertion    • Sleep apnea     NO C-PAP IN USE        PSURGH:  Past Surgical History:   Procedure Laterality Date   • APPENDECTOMY     • CARDIAC CATHETERIZATION N/A 03/09/2018    Procedure:  Right Heart Cath;  Surgeon: Vladislav Monsivais MD;  Location: [facility] CATH INVASIVE LOCATION;  Service: Cardiovascular   • CARDIAC CATHETERIZATION N/A 03/09/2018    Procedure: Left Heart Cath;  Surgeon: Vladislav Monsivais MD;  Location: [facility] CATH INVASIVE LOCATION;  Service: Cardiovascular   • CARDIAC CATHETERIZATION N/A 03/09/2018    Procedure: Coronary angiography;  Surgeon: Vladislav Monsivais MD;  Location: [facility] CATH INVASIVE LOCATION;  Service: Cardiovascular   • CARDIAC CATHETERIZATION N/A 03/09/2018    Procedure: Left ventriculography;  Surgeon: Vladislav Monsivais MD;  Location: [facility] INVASIVE LOCATION;  Service: Cardiovascular   • COLONOSCOPY     • CYSTOSCOPY LITHOLAPAXY BLADDER STONE EXTRACTION     • FRACTURE SURGERY Right 1995    right leg   • GASTRIC BANDING REMOVAL  08/2006   • HYSTERECTOMY  1984   • INTERVENTIONAL RADIOLOGY PROCEDURE Bilateral 03/09/2018    Procedure: Pulmonary Angiogram;  Surgeon: Vladislav Monsivais MD;  Location: [facility] INVASIVE LOCATION;  Service: Cardiovascular   • JOINT REPLACEMENT     • LAPAROSCOPIC GASTRIC BANDING  08/2004   • THORACOSCOPY Left 06/15/2018    Procedure: BRONCHOSCOPY, LEFT VIDEO ASSISTED THORACOSCOPY WITH DAVINCI ROBOT WITH LIGATION AV MALFORMATION INTERCOSTAL NERVE BLOCK WITH CRYOPROBE;  Surgeon: Darryl Briseno III, MD;  Location: [facility] MAIN OR;  Service: DaVinci   • TOTAL HIP ARTHROPLASTY Left 09/12/2022    Procedure: TOTAL HIP ARTHROPLASTY;  Surgeon: Reg Parisi MD;  Location: [facility];  Service: Orthopedics;  Laterality: Left;       SocialHx:  Social History     Occupational History   • Not on file   Tobacco Use   • Smoking status: Never   • Smokeless tobacco: Never   Vaping Use   • Vaping Use: Never used   Substance and Sexual Activity   • Alcohol use: No     Comment: daily caffiene, 1 energy drink    • Drug use: No   • Sexual activity: Defer      Social History     Social History Narrative   • Not on file       FamHx:  Family History   Problem Relation Age of Onset   •  "Melanoma Mother    • Breast cancer Mother    • Lung cancer Father    • Diabetes Father    • Heart disease Father    • Hypertension Sister    • Heart attack Son    • Heart block Son    • Malig Hyperthermia Neg Hx          Review of Systems:   A 14 point review of systems was performed, pertinent positives discussed above, all other systems are negative    Physical Exam: 62 y.o. female  Vital Signs :   Vitals:    12/08/22 1630   Temp: 97.6 °F (36.4 °C)   TempSrc: Temporal   Weight: 109 kg (240 lb)   Height: 162.6 cm (64.02\")     General: Alert and Oriented x 3, No acute distress.  Psych: mood and affect appropriate; recent and remote memory intact  Eyes: conjunctivae clear; pupils equally round and reactive, sclerae antiicteric  CV: no peripheral edema  Resp: normal respiratory effort  Skin: no rashes or wounds; normal turgor  Musculosketetal; pain with hip range of motion. Positive Stinchfeld test. No trochanteric tenderness.  Vascular: palpable distal pulses    Labs:    Pre-Admission Testing on 12/08/2022   Component Date Value Ref Range Status   • Glucose 12/08/2022 90  65 - 99 mg/dL Final   • BUN 12/08/2022 18  8 - 23 mg/dL Final   • Creatinine 12/08/2022 0.68  0.57 - 1.00 mg/dL Final   • Sodium 12/08/2022 139  136 - 145 mmol/L Final   • Potassium 12/08/2022 3.9  3.5 - 5.2 mmol/L Final   • Chloride 12/08/2022 100  98 - 107 mmol/L Final   • CO2 12/08/2022 26.8  22.0 - 29.0 mmol/L Final   • Calcium 12/08/2022 9.0  8.6 - 10.5 mg/dL Final   • BUN/Creatinine Ratio 12/08/2022 26.5 (H)  7.0 - 25.0 Final   • Anion Gap 12/08/2022 12.2  5.0 - 15.0 mmol/L Final   • eGFR 12/08/2022 98.0  >60.0 mL/min/1.73 Final    National Kidney Foundation and American Society of Nephrology (ASN) Task Force recommended calculation based on the Chronic Kidney Disease Epidemiology Collaboration (CKD-EPI) equation refit without adjustment for race.   • WBC 12/08/2022 6.27  3.40 - 10.80 10*3/mm3 Final   • RBC 12/08/2022 4.29  3.77 - 5.28 " 10*6/mm3 Final   • Hemoglobin 12/08/2022 12.6  12.0 - 15.9 g/dL Final   • Hematocrit 12/08/2022 37.8  34.0 - 46.6 % Final   • MCV 12/08/2022 88.1  79.0 - 97.0 fL Final   • MCH 12/08/2022 29.4  26.6 - 33.0 pg Final   • MCHC 12/08/2022 33.3  31.5 - 35.7 g/dL Final   • RDW 12/08/2022 13.4  12.3 - 15.4 % Final   • RDW-SD 12/08/2022 42.8  37.0 - 54.0 fl Final   • MPV 12/08/2022 10.0  6.0 - 12.0 fL Final   • Platelets 12/08/2022 194  140 - 450 10*3/mm3 Final     Xrays:  Xrays AP pelvis and a lateral of the Right hip were ordered and reviewed demonstrating  End stage hip OA with bone on bone articulation, subchondral cysts and periarticular osteophytes.    Assessment:  End-stage Right hip osteoarthritis. Conservative measures have failed.      Plan:  The plan is to proceed with Right Total Hip Replacement. The patient voiced understanding of the risks, benefits, and alternative forms of treatment that were discussed with Dr Parisi at the time of scheduling. 23     Kiersten Crenshaw, APRN  12/8/2022

## 2022-12-08 NOTE — H&P
Patient: Rylee Pennington    Date of Admission: 12/21/2022    YOB: 1960    Medical Record Number: [id number]    Admitting Physician: Dr. Reg Parisi    Reason for Admission: End Stage Right Hip OA    History of Present Illness: 62 y.o. female presents with severe end stage hip osteoarthritis which has not been responsive to the full compliment of conservative measures. Despite conservative attempts, there is still severe, constant activity limiting hip pain. Given the severity of the pain, the patient has elected to proceed with hip replacement.    Allergies: No Known Allergies      Current Medications:  Home Medications:    Current Outpatient Medications on File Prior to Visit   Medication Sig   • Acetaminophen (TYLENOL PO) Take  by mouth As Needed.   • [DISCONTINUED] HYDROcodone-acetaminophen (NORCO) 7.5-325 MG per tablet Take 1-2 tablets by mouth every 4-6 hours as needed for pain.  Take 2 only when in severe pain.   • [DISCONTINUED] ondansetron (Zofran) 4 MG tablet Take 1 tablet by mouth Every 8 (Eight) Hours As Needed for Nausea or Vomiting for up to 10 doses.     Current Facility-Administered Medications on File Prior to Visit   Medication   • Chlorhexidine Gluconate Cloth 2 % pads     PRN Meds:.    PMH:  Past Medical History:   Diagnosis Date   • Abrasion of left lower extremity     HAS APPT WITH DR PARISI AFTER PAT APPT TO DISCUSS ABRASION   • Anxiety     TOOK MEDS IN THE PAST-NONE LAST TWO YRS   • Arthritis    • AVM (arteriovenous malformation)    • Fracture, fibula    • Fracture, tibia and fibula    • GERD (gastroesophageal reflux disease)    • Hip pain    • History of anemia    • History of kidney stones    • Hypertension     HAS BEEN TREATED IN PAST WITH MEDS   • Left hip pain    • Shortness of breath on exertion    • Sleep apnea     NO C-PAP IN USE        PSURGH:  Past Surgical History:   Procedure Laterality Date   • APPENDECTOMY     • CARDIAC CATHETERIZATION N/A 03/09/2018    Procedure:  Right Heart Cath;  Surgeon: Vladislav Monsivais MD;  Location: [facility] CATH INVASIVE LOCATION;  Service: Cardiovascular   • CARDIAC CATHETERIZATION N/A 03/09/2018    Procedure: Left Heart Cath;  Surgeon: Vladislav Monsivais MD;  Location: [facility] CATH INVASIVE LOCATION;  Service: Cardiovascular   • CARDIAC CATHETERIZATION N/A 03/09/2018    Procedure: Coronary angiography;  Surgeon: Vladislav Monsivais MD;  Location: [facility] CATH INVASIVE LOCATION;  Service: Cardiovascular   • CARDIAC CATHETERIZATION N/A 03/09/2018    Procedure: Left ventriculography;  Surgeon: Vlaidslav Monsivais MD;  Location: [facility] INVASIVE LOCATION;  Service: Cardiovascular   • COLONOSCOPY     • CYSTOSCOPY LITHOLAPAXY BLADDER STONE EXTRACTION     • FRACTURE SURGERY Right 1995    right leg   • GASTRIC BANDING REMOVAL  08/2006   • HYSTERECTOMY  1984   • INTERVENTIONAL RADIOLOGY PROCEDURE Bilateral 03/09/2018    Procedure: Pulmonary Angiogram;  Surgeon: Vladislav Monsivais MD;  Location: [facility] INVASIVE LOCATION;  Service: Cardiovascular   • JOINT REPLACEMENT     • LAPAROSCOPIC GASTRIC BANDING  08/2004   • THORACOSCOPY Left 06/15/2018    Procedure: BRONCHOSCOPY, LEFT VIDEO ASSISTED THORACOSCOPY WITH DAVINCI ROBOT WITH LIGATION AV MALFORMATION INTERCOSTAL NERVE BLOCK WITH CRYOPROBE;  Surgeon: Darryl Briseno III, MD;  Location: [facility] MAIN OR;  Service: DaVinci   • TOTAL HIP ARTHROPLASTY Left 09/12/2022    Procedure: TOTAL HIP ARTHROPLASTY;  Surgeon: Reg Parisi MD;  Location: [facility] [place];  Service: Orthopedics;  Laterality: Left;       SocialHx:  Social History     Occupational History   • Not on file   Tobacco Use   • Smoking status: Never   • Smokeless tobacco: Never   Vaping Use   • Vaping Use: Never used   Substance and Sexual Activity   • Alcohol use: No     Comment: daily caffiene, 1 energy drink    • Drug use: No   • Sexual activity: Defer      Social History     Social History Narrative   • Not on file       FamHx:  Family History   Problem Relation Age of Onset   •  "Melanoma Mother    • Breast cancer Mother    • Lung cancer Father    • Diabetes Father    • Heart disease Father    • Hypertension Sister    • Heart attack Son    • Heart block Son    • Malig Hyperthermia Neg Hx          Review of Systems:   A 14 point review of systems was performed, pertinent positives discussed above, all other systems are negative    Physical Exam: 62 y.o. female  Vital Signs :   Vitals:    12/08/22 1630   Temp: 97.6 °F (36.4 °C)   TempSrc: Temporal   Weight: 109 kg (240 lb)   Height: 162.6 cm (64.02\")     General: Alert and Oriented x 3, No acute distress.  Psych: mood and affect appropriate; recent and remote memory intact  Eyes: conjunctivae clear; pupils equally round and reactive, sclerae antiicteric  CV: no peripheral edema  Resp: normal respiratory effort  Skin: no rashes or wounds; normal turgor  Musculosketetal; pain with hip range of motion. Positive Stinchfeld test. No trochanteric tenderness.  Vascular: palpable distal pulses    Labs:    Pre-Admission Testing on 12/08/2022   Component Date Value Ref Range Status   • Glucose 12/08/2022 90  65 - 99 mg/dL Final   • BUN 12/08/2022 18  8 - 23 mg/dL Final   • Creatinine 12/08/2022 0.68  0.57 - 1.00 mg/dL Final   • Sodium 12/08/2022 139  136 - 145 mmol/L Final   • Potassium 12/08/2022 3.9  3.5 - 5.2 mmol/L Final   • Chloride 12/08/2022 100  98 - 107 mmol/L Final   • CO2 12/08/2022 26.8  22.0 - 29.0 mmol/L Final   • Calcium 12/08/2022 9.0  8.6 - 10.5 mg/dL Final   • BUN/Creatinine Ratio 12/08/2022 26.5 (H)  7.0 - 25.0 Final   • Anion Gap 12/08/2022 12.2  5.0 - 15.0 mmol/L Final   • eGFR 12/08/2022 98.0  >60.0 mL/min/1.73 Final    National Kidney Foundation and American Society of Nephrology (ASN) Task Force recommended calculation based on the Chronic Kidney Disease Epidemiology Collaboration (CKD-EPI) equation refit without adjustment for race.   • WBC 12/08/2022 6.27  3.40 - 10.80 10*3/mm3 Final   • RBC 12/08/2022 4.29  3.77 - 5.28 " 10*6/mm3 Final   • Hemoglobin 12/08/2022 12.6  12.0 - 15.9 g/dL Final   • Hematocrit 12/08/2022 37.8  34.0 - 46.6 % Final   • MCV 12/08/2022 88.1  79.0 - 97.0 fL Final   • MCH 12/08/2022 29.4  26.6 - 33.0 pg Final   • MCHC 12/08/2022 33.3  31.5 - 35.7 g/dL Final   • RDW 12/08/2022 13.4  12.3 - 15.4 % Final   • RDW-SD 12/08/2022 42.8  37.0 - 54.0 fl Final   • MPV 12/08/2022 10.0  6.0 - 12.0 fL Final   • Platelets 12/08/2022 194  140 - 450 10*3/mm3 Final     Xrays:  Xrays AP pelvis and a lateral of the Right hip were ordered and reviewed demonstrating  End stage hip OA with bone on bone articulation, subchondral cysts and periarticular osteophytes.    Assessment:  End-stage Right hip osteoarthritis. Conservative measures have failed.      Plan:  The plan is to proceed with Right Total Hip Replacement. The patient voiced understanding of the risks, benefits, and alternative forms of treatment that were discussed with Dr Parisi at the time of scheduling. 23     Kiersten Crenshaw, APRN  12/8/2022

## 2022-12-21 ENCOUNTER — ANESTHESIA EVENT (OUTPATIENT)
Dept: PERIOP | Facility: HOSPITAL | Age: 62
End: 2022-12-21

## 2022-12-21 ENCOUNTER — APPOINTMENT (OUTPATIENT)
Dept: GENERAL RADIOLOGY | Facility: HOSPITAL | Age: 62
End: 2022-12-21

## 2022-12-21 ENCOUNTER — HOSPITAL ENCOUNTER (OUTPATIENT)
Facility: HOSPITAL | Age: 62
Discharge: HOME HEALTH | End: 2022-12-22
Attending: ORTHOPAEDIC SURGERY | Admitting: ORTHOPAEDIC SURGERY

## 2022-12-21 ENCOUNTER — ANESTHESIA (OUTPATIENT)
Dept: PERIOP | Facility: HOSPITAL | Age: 62
End: 2022-12-21

## 2022-12-21 DIAGNOSIS — M16.11 PRIMARY OSTEOARTHRITIS OF RIGHT HIP: ICD-10-CM

## 2022-12-21 DIAGNOSIS — Z96.641 STATUS POST TOTAL HIP REPLACEMENT, RIGHT: Primary | ICD-10-CM

## 2022-12-21 PROCEDURE — 27130 TOTAL HIP ARTHROPLASTY: CPT | Performed by: ORTHOPAEDIC SURGERY

## 2022-12-21 PROCEDURE — 25010000002 CEFAZOLIN PER 500 MG: Performed by: ORTHOPAEDIC SURGERY

## 2022-12-21 PROCEDURE — 25010000002 MIDAZOLAM PER 1 MG: Performed by: ANESTHESIOLOGY

## 2022-12-21 PROCEDURE — G0378 HOSPITAL OBSERVATION PER HR: HCPCS

## 2022-12-21 PROCEDURE — 25010000002 FENTANYL CITRATE (PF) 100 MCG/2ML SOLUTION: Performed by: NURSE ANESTHETIST, CERTIFIED REGISTERED

## 2022-12-21 PROCEDURE — 25010000002 PROPOFOL 10 MG/ML EMULSION: Performed by: NURSE ANESTHETIST, CERTIFIED REGISTERED

## 2022-12-21 PROCEDURE — 73501 X-RAY EXAM HIP UNI 1 VIEW: CPT

## 2022-12-21 PROCEDURE — 25010000002 ROPIVACAINE PER 1 MG: Performed by: ORTHOPAEDIC SURGERY

## 2022-12-21 PROCEDURE — 25010000002 ONDANSETRON PER 1 MG: Performed by: NURSE ANESTHETIST, CERTIFIED REGISTERED

## 2022-12-21 PROCEDURE — 25010000002 KETOROLAC TROMETHAMINE PER 15 MG: Performed by: ORTHOPAEDIC SURGERY

## 2022-12-21 PROCEDURE — 86900 BLOOD TYPING SEROLOGIC ABO: CPT | Performed by: ORTHOPAEDIC SURGERY

## 2022-12-21 PROCEDURE — 25010000002 CEFAZOLIN IN DEXTROSE 2-4 GM/100ML-% SOLUTION: Performed by: NURSE PRACTITIONER

## 2022-12-21 PROCEDURE — 25010000002 EPINEPHRINE 1 MG/ML SOLUTION 30 ML VIAL: Performed by: ORTHOPAEDIC SURGERY

## 2022-12-21 PROCEDURE — C1776 JOINT DEVICE (IMPLANTABLE): HCPCS | Performed by: ORTHOPAEDIC SURGERY

## 2022-12-21 PROCEDURE — 25010000002 DEXAMETHASONE SODIUM PHOSPHATE 20 MG/5ML SOLUTION: Performed by: NURSE ANESTHETIST, CERTIFIED REGISTERED

## 2022-12-21 PROCEDURE — 25010000002 MORPHINE PER 10 MG: Performed by: ORTHOPAEDIC SURGERY

## 2022-12-21 PROCEDURE — 27130 TOTAL HIP ARTHROPLASTY: CPT | Performed by: NURSE PRACTITIONER

## 2022-12-21 PROCEDURE — 86850 RBC ANTIBODY SCREEN: CPT | Performed by: ORTHOPAEDIC SURGERY

## 2022-12-21 PROCEDURE — 86901 BLOOD TYPING SEROLOGIC RH(D): CPT | Performed by: ORTHOPAEDIC SURGERY

## 2022-12-21 PROCEDURE — 25010000002 HYDROMORPHONE 1 MG/ML SOLUTION: Performed by: NURSE ANESTHETIST, CERTIFIED REGISTERED

## 2022-12-21 PROCEDURE — 25010000002 VANCOMYCIN 10 G RECONSTITUTED SOLUTION: Performed by: ORTHOPAEDIC SURGERY

## 2022-12-21 DEVICE — REFLECTION SPHERICAL HEAD SCREW 25MM
Type: IMPLANTABLE DEVICE | Site: HIP | Status: FUNCTIONAL
Brand: REFLECTION

## 2022-12-21 DEVICE — R3 20 DEGREE XLPE ACETABULAR LINER                                    36MM INNER DIAMETER X OUTER DIAMETER 52MM
Type: IMPLANTABLE DEVICE | Site: HIP | Status: FUNCTIONAL
Brand: R3

## 2022-12-21 DEVICE — KNOTLESS TISSUE CONTROL DEVICE, UNDYED UNIDIRECTIONAL (ANTIBACTERIAL) SYNTHETIC ABSORBABLE DEVICE
Type: IMPLANTABLE DEVICE | Site: HIP | Status: FUNCTIONAL
Brand: STRATAFIX

## 2022-12-21 DEVICE — IMPLANTABLE DEVICE: Type: IMPLANTABLE DEVICE | Site: HIP | Status: FUNCTIONAL

## 2022-12-21 DEVICE — VIOLET ANTIBACTERIAL POLYDIOXANONE, KNOTLESS TISSUE CONTROL DEVICE
Type: IMPLANTABLE DEVICE | Site: HIP | Status: FUNCTIONAL
Brand: STRATAFIX

## 2022-12-21 DEVICE — R3 3 HOLE ACETABULAR SHELL 52MM
Type: IMPLANTABLE DEVICE | Site: HIP | Status: FUNCTIONAL
Brand: R3 ACETABULAR

## 2022-12-21 DEVICE — OXINIUM FEMORAL HEAD 12/14 TAPER                                    36 MM +0
Type: IMPLANTABLE DEVICE | Site: HIP | Status: FUNCTIONAL
Brand: OXINIUM

## 2022-12-21 DEVICE — POLARSTEM COLLAR STANDARD                                    NON-CEMENTED WITH TI/HA 2
Type: IMPLANTABLE DEVICE | Site: HIP | Status: FUNCTIONAL
Brand: POLARSTEM

## 2022-12-21 RX ORDER — HYDROMORPHONE HYDROCHLORIDE 1 MG/ML
0.5 INJECTION, SOLUTION INTRAMUSCULAR; INTRAVENOUS; SUBCUTANEOUS
Status: DISCONTINUED | OUTPATIENT
Start: 2022-12-21 | End: 2022-12-21 | Stop reason: HOSPADM

## 2022-12-21 RX ORDER — PANTOPRAZOLE SODIUM 40 MG/1
40 TABLET, DELAYED RELEASE ORAL DAILY
Qty: 14 TABLET | Refills: 0 | Status: SHIPPED | OUTPATIENT
Start: 2022-12-21 | End: 2023-01-04

## 2022-12-21 RX ORDER — DEXAMETHASONE SODIUM PHOSPHATE 4 MG/ML
INJECTION, SOLUTION INTRA-ARTICULAR; INTRALESIONAL; INTRAMUSCULAR; INTRAVENOUS; SOFT TISSUE AS NEEDED
Status: DISCONTINUED | OUTPATIENT
Start: 2022-12-21 | End: 2022-12-21 | Stop reason: SURG

## 2022-12-21 RX ORDER — UREA 10 %
1 LOTION (ML) TOPICAL NIGHTLY PRN
Status: DISCONTINUED | OUTPATIENT
Start: 2022-12-21 | End: 2022-12-22 | Stop reason: HOSPADM

## 2022-12-21 RX ORDER — PROMETHAZINE HYDROCHLORIDE 12.5 MG/1
12.5 TABLET ORAL EVERY 4 HOURS PRN
Status: DISCONTINUED | OUTPATIENT
Start: 2022-12-21 | End: 2022-12-22 | Stop reason: HOSPADM

## 2022-12-21 RX ORDER — SODIUM CHLORIDE 0.9 % (FLUSH) 0.9 %
3 SYRINGE (ML) INJECTION EVERY 12 HOURS SCHEDULED
Status: DISCONTINUED | OUTPATIENT
Start: 2022-12-21 | End: 2022-12-21 | Stop reason: HOSPADM

## 2022-12-21 RX ORDER — CEFAZOLIN SODIUM IN 0.9 % NACL 3 G/100 ML
3 INTRAVENOUS SOLUTION, PIGGYBACK (ML) INTRAVENOUS ONCE
Status: COMPLETED | OUTPATIENT
Start: 2022-12-21 | End: 2022-12-21

## 2022-12-21 RX ORDER — SODIUM CHLORIDE, SODIUM LACTATE, POTASSIUM CHLORIDE, CALCIUM CHLORIDE 600; 310; 30; 20 MG/100ML; MG/100ML; MG/100ML; MG/100ML
9 INJECTION, SOLUTION INTRAVENOUS CONTINUOUS
Status: DISCONTINUED | OUTPATIENT
Start: 2022-12-21 | End: 2022-12-21

## 2022-12-21 RX ORDER — ONDANSETRON 2 MG/ML
INJECTION INTRAMUSCULAR; INTRAVENOUS AS NEEDED
Status: DISCONTINUED | OUTPATIENT
Start: 2022-12-21 | End: 2022-12-21 | Stop reason: SURG

## 2022-12-21 RX ORDER — DIPHENHYDRAMINE HYDROCHLORIDE 50 MG/ML
12.5 INJECTION INTRAMUSCULAR; INTRAVENOUS
Status: DISCONTINUED | OUTPATIENT
Start: 2022-12-21 | End: 2022-12-21 | Stop reason: HOSPADM

## 2022-12-21 RX ORDER — OXYCODONE AND ACETAMINOPHEN 7.5; 325 MG/1; MG/1
1 TABLET ORAL EVERY 4 HOURS PRN
Status: DISCONTINUED | OUTPATIENT
Start: 2022-12-21 | End: 2022-12-21 | Stop reason: HOSPADM

## 2022-12-21 RX ORDER — POLYETHYLENE GLYCOL 3350 17 G/17G
17 POWDER, FOR SOLUTION ORAL 2 TIMES DAILY
Qty: 238 G | Refills: 0 | Status: SHIPPED | OUTPATIENT
Start: 2022-12-21 | End: 2022-12-28

## 2022-12-21 RX ORDER — HYDROCODONE BITARTRATE AND ACETAMINOPHEN 7.5; 325 MG/1; MG/1
2 TABLET ORAL EVERY 4 HOURS PRN
Status: DISCONTINUED | OUTPATIENT
Start: 2022-12-21 | End: 2022-12-22 | Stop reason: HOSPADM

## 2022-12-21 RX ORDER — MELOXICAM 15 MG/1
15 TABLET ORAL DAILY
Qty: 14 TABLET | Refills: 0 | Status: SHIPPED | OUTPATIENT
Start: 2022-12-21 | End: 2023-01-04

## 2022-12-21 RX ORDER — ACETAMINOPHEN 10 MG/ML
INJECTION, SOLUTION INTRAVENOUS AS NEEDED
Status: DISCONTINUED | OUTPATIENT
Start: 2022-12-21 | End: 2022-12-21 | Stop reason: SURG

## 2022-12-21 RX ORDER — HYDROCODONE BITARTRATE AND ACETAMINOPHEN 7.5; 325 MG/1; MG/1
1 TABLET ORAL EVERY 4 HOURS PRN
Status: DISCONTINUED | OUTPATIENT
Start: 2022-12-21 | End: 2022-12-22 | Stop reason: HOSPADM

## 2022-12-21 RX ORDER — ROCURONIUM BROMIDE 10 MG/ML
INJECTION, SOLUTION INTRAVENOUS AS NEEDED
Status: DISCONTINUED | OUTPATIENT
Start: 2022-12-21 | End: 2022-12-21 | Stop reason: SURG

## 2022-12-21 RX ORDER — FENTANYL CITRATE 50 UG/ML
INJECTION, SOLUTION INTRAMUSCULAR; INTRAVENOUS AS NEEDED
Status: DISCONTINUED | OUTPATIENT
Start: 2022-12-21 | End: 2022-12-21 | Stop reason: SURG

## 2022-12-21 RX ORDER — ONDANSETRON 4 MG/1
4 TABLET, FILM COATED ORAL EVERY 6 HOURS PRN
Status: DISCONTINUED | OUTPATIENT
Start: 2022-12-21 | End: 2022-12-22 | Stop reason: HOSPADM

## 2022-12-21 RX ORDER — PROMETHAZINE HYDROCHLORIDE 25 MG/1
25 TABLET ORAL ONCE AS NEEDED
Status: DISCONTINUED | OUTPATIENT
Start: 2022-12-21 | End: 2022-12-21 | Stop reason: HOSPADM

## 2022-12-21 RX ORDER — HYDROCODONE BITARTRATE AND ACETAMINOPHEN 7.5; 325 MG/1; MG/1
1 TABLET ORAL EVERY 4 HOURS PRN
Qty: 42 TABLET | Refills: 0 | Status: SHIPPED | OUTPATIENT
Start: 2022-12-21

## 2022-12-21 RX ORDER — MIDAZOLAM HYDROCHLORIDE 1 MG/ML
1 INJECTION INTRAMUSCULAR; INTRAVENOUS
Status: DISCONTINUED | OUTPATIENT
Start: 2022-12-21 | End: 2022-12-21 | Stop reason: HOSPADM

## 2022-12-21 RX ORDER — EPHEDRINE SULFATE 50 MG/ML
5 INJECTION, SOLUTION INTRAVENOUS ONCE AS NEEDED
Status: DISCONTINUED | OUTPATIENT
Start: 2022-12-21 | End: 2022-12-21 | Stop reason: HOSPADM

## 2022-12-21 RX ORDER — PROPOFOL 10 MG/ML
VIAL (ML) INTRAVENOUS AS NEEDED
Status: DISCONTINUED | OUTPATIENT
Start: 2022-12-21 | End: 2022-12-21 | Stop reason: SURG

## 2022-12-21 RX ORDER — MAGNESIUM HYDROXIDE 1200 MG/15ML
LIQUID ORAL AS NEEDED
Status: DISCONTINUED | OUTPATIENT
Start: 2022-12-21 | End: 2022-12-21 | Stop reason: HOSPADM

## 2022-12-21 RX ORDER — FLUMAZENIL 0.1 MG/ML
0.2 INJECTION INTRAVENOUS AS NEEDED
Status: DISCONTINUED | OUTPATIENT
Start: 2022-12-21 | End: 2022-12-21 | Stop reason: HOSPADM

## 2022-12-21 RX ORDER — PREGABALIN 75 MG/1
150 CAPSULE ORAL ONCE
Status: COMPLETED | OUTPATIENT
Start: 2022-12-21 | End: 2022-12-21

## 2022-12-21 RX ORDER — ONDANSETRON 2 MG/ML
4 INJECTION INTRAMUSCULAR; INTRAVENOUS ONCE AS NEEDED
Status: DISCONTINUED | OUTPATIENT
Start: 2022-12-21 | End: 2022-12-21 | Stop reason: HOSPADM

## 2022-12-21 RX ORDER — ONDANSETRON 2 MG/ML
4 INJECTION INTRAMUSCULAR; INTRAVENOUS ONCE AS NEEDED
Status: DISCONTINUED | OUTPATIENT
Start: 2022-12-21 | End: 2022-12-22 | Stop reason: HOSPADM

## 2022-12-21 RX ORDER — HYDROCODONE BITARTRATE AND ACETAMINOPHEN 7.5; 325 MG/1; MG/1
1 TABLET ORAL ONCE AS NEEDED
Status: COMPLETED | OUTPATIENT
Start: 2022-12-21 | End: 2022-12-21

## 2022-12-21 RX ORDER — POVIDONE-IODINE 10 MG/ML
SOLUTION TOPICAL ONCE
Status: DISCONTINUED | OUTPATIENT
Start: 2022-12-21 | End: 2022-12-21 | Stop reason: HOSPADM

## 2022-12-21 RX ORDER — ASPIRIN 81 MG/1
TABLET ORAL
Qty: 60 TABLET | Refills: 0 | Status: SHIPPED | OUTPATIENT
Start: 2022-12-21

## 2022-12-21 RX ORDER — NALOXONE HCL 0.4 MG/ML
0.2 VIAL (ML) INJECTION AS NEEDED
Status: DISCONTINUED | OUTPATIENT
Start: 2022-12-21 | End: 2022-12-21 | Stop reason: HOSPADM

## 2022-12-21 RX ORDER — ONDANSETRON 4 MG/1
4 TABLET, FILM COATED ORAL EVERY 8 HOURS PRN
Qty: 10 TABLET | Refills: 0 | Status: SHIPPED | OUTPATIENT
Start: 2022-12-21

## 2022-12-21 RX ORDER — CHLORHEXIDINE GLUCONATE 500 MG/1
CLOTH TOPICAL 2 TIMES DAILY
Status: DISCONTINUED | OUTPATIENT
Start: 2022-12-21 | End: 2022-12-22 | Stop reason: HOSPADM

## 2022-12-21 RX ORDER — HYDRALAZINE HYDROCHLORIDE 20 MG/ML
5 INJECTION INTRAMUSCULAR; INTRAVENOUS
Status: DISCONTINUED | OUTPATIENT
Start: 2022-12-21 | End: 2022-12-21 | Stop reason: HOSPADM

## 2022-12-21 RX ORDER — DIPHENHYDRAMINE HCL 25 MG
25 CAPSULE ORAL
Status: DISCONTINUED | OUTPATIENT
Start: 2022-12-21 | End: 2022-12-21 | Stop reason: HOSPADM

## 2022-12-21 RX ORDER — ASPIRIN 81 MG/1
81 TABLET ORAL EVERY 12 HOURS SCHEDULED
Status: DISCONTINUED | OUTPATIENT
Start: 2022-12-22 | End: 2022-12-22 | Stop reason: HOSPADM

## 2022-12-21 RX ORDER — ACETAMINOPHEN 325 MG/1
650 TABLET ORAL EVERY 6 HOURS PRN
Status: DISCONTINUED | OUTPATIENT
Start: 2022-12-21 | End: 2022-12-22 | Stop reason: HOSPADM

## 2022-12-21 RX ORDER — LIDOCAINE HYDROCHLORIDE 20 MG/ML
INJECTION, SOLUTION EPIDURAL; INFILTRATION; INTRACAUDAL; PERINEURAL AS NEEDED
Status: DISCONTINUED | OUTPATIENT
Start: 2022-12-21 | End: 2022-12-21 | Stop reason: SURG

## 2022-12-21 RX ORDER — MELOXICAM 15 MG/1
15 TABLET ORAL ONCE
Status: COMPLETED | OUTPATIENT
Start: 2022-12-21 | End: 2022-12-21

## 2022-12-21 RX ORDER — SODIUM CHLORIDE 0.9 % (FLUSH) 0.9 %
3-10 SYRINGE (ML) INJECTION AS NEEDED
Status: DISCONTINUED | OUTPATIENT
Start: 2022-12-21 | End: 2022-12-21 | Stop reason: HOSPADM

## 2022-12-21 RX ORDER — FENTANYL CITRATE 50 UG/ML
50 INJECTION, SOLUTION INTRAMUSCULAR; INTRAVENOUS
Status: DISCONTINUED | OUTPATIENT
Start: 2022-12-21 | End: 2022-12-21 | Stop reason: HOSPADM

## 2022-12-21 RX ORDER — CEFAZOLIN SODIUM 2 G/100ML
2 INJECTION, SOLUTION INTRAVENOUS EVERY 8 HOURS
Status: COMPLETED | OUTPATIENT
Start: 2022-12-21 | End: 2022-12-22

## 2022-12-21 RX ORDER — PROMETHAZINE HYDROCHLORIDE 25 MG/1
25 SUPPOSITORY RECTAL ONCE AS NEEDED
Status: DISCONTINUED | OUTPATIENT
Start: 2022-12-21 | End: 2022-12-21 | Stop reason: HOSPADM

## 2022-12-21 RX ORDER — LABETALOL HYDROCHLORIDE 5 MG/ML
5 INJECTION, SOLUTION INTRAVENOUS
Status: DISCONTINUED | OUTPATIENT
Start: 2022-12-21 | End: 2022-12-21 | Stop reason: HOSPADM

## 2022-12-21 RX ORDER — FAMOTIDINE 10 MG/ML
20 INJECTION, SOLUTION INTRAVENOUS ONCE
Status: COMPLETED | OUTPATIENT
Start: 2022-12-21 | End: 2022-12-21

## 2022-12-21 RX ORDER — TRANEXAMIC ACID 100 MG/ML
INJECTION, SOLUTION INTRAVENOUS AS NEEDED
Status: DISCONTINUED | OUTPATIENT
Start: 2022-12-21 | End: 2022-12-21 | Stop reason: SURG

## 2022-12-21 RX ORDER — LABETALOL HYDROCHLORIDE 5 MG/ML
INJECTION, SOLUTION INTRAVENOUS AS NEEDED
Status: DISCONTINUED | OUTPATIENT
Start: 2022-12-21 | End: 2022-12-21 | Stop reason: SURG

## 2022-12-21 RX ADMIN — MIDAZOLAM HYDROCHLORIDE 2 MG: 1 INJECTION, SOLUTION INTRAMUSCULAR; INTRAVENOUS at 13:58

## 2022-12-21 RX ADMIN — HYDROCODONE BITARTRATE AND ACETAMINOPHEN 1 TABLET: 7.5; 325 TABLET ORAL at 16:35

## 2022-12-21 RX ADMIN — LIDOCAINE HYDROCHLORIDE 40 MG: 20 INJECTION, SOLUTION EPIDURAL; INFILTRATION; INTRACAUDAL; PERINEURAL at 13:46

## 2022-12-21 RX ADMIN — ACETAMINOPHEN 1000 MG: 10 INJECTION, SOLUTION INTRAVENOUS at 14:07

## 2022-12-21 RX ADMIN — ONDANSETRON 4 MG: 2 INJECTION INTRAMUSCULAR; INTRAVENOUS at 15:24

## 2022-12-21 RX ADMIN — CHLORHEXIDINE GLUCONATE: 500 CLOTH TOPICAL at 21:33

## 2022-12-21 RX ADMIN — MELOXICAM 15 MG: 15 TABLET ORAL at 12:32

## 2022-12-21 RX ADMIN — PREGABALIN 150 MG: 75 CAPSULE ORAL at 12:32

## 2022-12-21 RX ADMIN — PROPOFOL 150 MG: 10 INJECTION, EMULSION INTRAVENOUS at 13:46

## 2022-12-21 RX ADMIN — DEXAMETHASONE SODIUM PHOSPHATE 8 MG: 4 INJECTION, SOLUTION INTRAMUSCULAR; INTRAVENOUS at 13:53

## 2022-12-21 RX ADMIN — TRANEXAMIC ACID 1000 MG: 1 INJECTION, SOLUTION INTRAVENOUS at 14:07

## 2022-12-21 RX ADMIN — HYDROMORPHONE HYDROCHLORIDE 0.5 MG: 1 INJECTION, SOLUTION INTRAMUSCULAR; INTRAVENOUS; SUBCUTANEOUS at 14:23

## 2022-12-21 RX ADMIN — SODIUM CHLORIDE, POTASSIUM CHLORIDE, SODIUM LACTATE AND CALCIUM CHLORIDE: 600; 310; 30; 20 INJECTION, SOLUTION INTRAVENOUS at 14:37

## 2022-12-21 RX ADMIN — LABETALOL HYDROCHLORIDE 10 MG: 5 INJECTION, SOLUTION INTRAVENOUS at 14:48

## 2022-12-21 RX ADMIN — TRANEXAMIC ACID 1000 MG: 1 INJECTION, SOLUTION INTRAVENOUS at 15:20

## 2022-12-21 RX ADMIN — VANCOMYCIN HYDROCHLORIDE 1750 MG: 10 INJECTION, POWDER, LYOPHILIZED, FOR SOLUTION INTRAVENOUS at 12:47

## 2022-12-21 RX ADMIN — SODIUM CHLORIDE, POTASSIUM CHLORIDE, SODIUM LACTATE AND CALCIUM CHLORIDE 500 ML: 600; 310; 30; 20 INJECTION, SOLUTION INTRAVENOUS at 12:48

## 2022-12-21 RX ADMIN — ROCURONIUM BROMIDE 50 MG: 50 INJECTION, SOLUTION INTRAVENOUS at 13:46

## 2022-12-21 RX ADMIN — ROCURONIUM BROMIDE 20 MG: 50 INJECTION, SOLUTION INTRAVENOUS at 14:51

## 2022-12-21 RX ADMIN — PROPOFOL 50 MG: 10 INJECTION, EMULSION INTRAVENOUS at 13:49

## 2022-12-21 RX ADMIN — CEFAZOLIN 3 G: 10 INJECTION, POWDER, FOR SOLUTION INTRAVENOUS at 13:23

## 2022-12-21 RX ADMIN — PROPOFOL 125 MCG/KG/MIN: 10 INJECTION, EMULSION INTRAVENOUS at 13:44

## 2022-12-21 RX ADMIN — FENTANYL CITRATE 100 MCG: 50 INJECTION INTRAMUSCULAR; INTRAVENOUS at 13:53

## 2022-12-21 RX ADMIN — CEFAZOLIN SODIUM 2 G: 2 INJECTION, SOLUTION INTRAVENOUS at 21:33

## 2022-12-21 RX ADMIN — SUGAMMADEX 200 MG: 100 INJECTION, SOLUTION INTRAVENOUS at 15:24

## 2022-12-21 RX ADMIN — SODIUM CHLORIDE, POTASSIUM CHLORIDE, SODIUM LACTATE AND CALCIUM CHLORIDE: 600; 310; 30; 20 INJECTION, SOLUTION INTRAVENOUS at 14:13

## 2022-12-21 RX ADMIN — FAMOTIDINE 20 MG: 10 INJECTION INTRAVENOUS at 12:45

## 2022-12-21 RX ADMIN — SODIUM CHLORIDE, POTASSIUM CHLORIDE, SODIUM LACTATE AND CALCIUM CHLORIDE: 600; 310; 30; 20 INJECTION, SOLUTION INTRAVENOUS at 13:37

## 2022-12-21 NOTE — ANESTHESIA PREPROCEDURE EVALUATION
Anesthesia Evaluation     no history of anesthetic complications:  NPO Solid Status: > 8 hours  NPO Liquid Status: > 2 hours           Airway   Mallampati: II  Neck ROM: full  no difficulty expected  Dental - normal exam     Pulmonary - normal exam   (+) shortness of breath, sleep apnea,   (-) COPD, asthma, not a smoker    PE comment: nonlabored  Cardiovascular - normal exam  Exercise tolerance: good (4-7 METS)    Rhythm: regular  Rate: normal    (+) hypertension, BASILIO,   (-) valvular problems/murmurs, past MI, CAD, dysrhythmias, angina    ROS comment: H/o congenital AVM (cardio-pulmonary)    Neuro/Psych- negative ROS  (-) seizures, TIA, CVA  GI/Hepatic/Renal/Endo    (+) morbid obesity, GERD,  renal disease stones,   (-) liver disease, diabetes, no thyroid disorder    Musculoskeletal     (+) arthralgias,   Abdominal    Substance History      OB/GYN          Other   arthritis,                      Anesthesia Plan    ASA 3     general   total IV anesthesia  intravenous induction     Anesthetic plan, risks, benefits, and alternatives have been provided, discussed and informed consent has been obtained with: patient.        CODE STATUS:

## 2022-12-21 NOTE — ANESTHESIA POSTPROCEDURE EVALUATION
Patient: Rylee Pennington    Procedure Summary     Date: 12/21/22 Room / Location:  [facility] [address] [place] [address] [facility]    Anesthesia Start: 1336 Anesthesia Stop: 1552    Procedure: TOTAL HIP ARTHROPLASTY (Right: Hip) Diagnosis:       Primary osteoarthritis of right hip      (Primary osteoarthritis of right hip [M16.11])    Surgeons: Reg Parisi MD Provider: Karri Ivy MD    Anesthesia Type: general ASA Status: 3          Anesthesia Type: general    Vitals  Vitals Value Taken Time   /72 12/21/22 1645   Temp 36.4 °C (97.6 °F) 12/21/22 1645   Pulse 70 12/21/22 1647   Resp 15 12/21/22 1547   SpO2 100 % 12/21/22 1647   Vitals shown include unvalidated device data.        Post Anesthesia Care and Evaluation    Patient location during evaluation: bedside  Patient participation: complete - patient participated  Level of consciousness: awake  Pain management: adequate    Airway patency: patent  Anesthetic complications: No anesthetic complications    Cardiovascular status: acceptable  Respiratory status: acceptable  Hydration status: acceptable    Comments: */91   Pulse 70   Temp 36.4 °C (97.6 °F) (Oral)   Resp 15   SpO2 100%

## 2022-12-21 NOTE — PLAN OF CARE
Goal Outcome Evaluation:  Plan of Care Reviewed With: patient      Vss, nvi, dressing c/d/I, ambulated 30 ft, assist x1, minimal c/o pain, voiding per brp, plans to d/c home tomorrow with family, educated on bp meds and monitoring.  Progress: improving

## 2022-12-21 NOTE — ANESTHESIA PROCEDURE NOTES
Airway  Urgency: elective    Date/Time: 12/21/2022 1:47 PM  Airway not difficult    General Information and Staff    Patient location during procedure: OR  Anesthesiologist: Anthony Redman MD  CRNA/CAA: Zehra Gonzales CRNA    Indications and Patient Condition  Indications for airway management: airway protection    Preoxygenated: yes  Mask difficulty assessment: 1 - vent by mask    Final Airway Details  Final airway type: endotracheal airway      Successful airway: ETT  Cuffed: yes   Successful intubation technique: direct laryngoscopy  Facilitating devices/methods: intubating stylet  Endotracheal tube insertion site: oral  Blade: Rodriguez  Blade size: 2  ETT size (mm): 7.0  Cormack-Lehane Classification: grade I - full view of glottis  Placement verified by: chest auscultation and capnometry   Cuff volume (mL): 7  Measured from: teeth  ETT/EBT  to teeth (cm): 20  Number of attempts at approach: 1  Assessment: lips, teeth, and gum same as pre-op and atraumatic intubation

## 2022-12-21 NOTE — OP NOTE
Name: Rylee Pennington  YOB: 1960    DATE OF SURGERY: 12/21/2022    PREOPERATIVE DIAGNOSIS: Right hip end-stage osteoarthritis    POSTOPERATIVE DIAGNOSIS: Right hip end-stage osteoarthritis    PROCEDURE PERFORMED: Right  total hip replacement    SURGEON: Reg Parisi M.D.    ASSISTANT: JULIAN CARLTON    A surgical assistant was integral in ensuring a successful outcome with this procedure.  The assistant was utilized to assist in positioning the patient, draping the patient, was used throughout the case to provide with retraction of tissues, suctioning of blood and body fluids for visualization, positioning of the extremity to allow for proper exposure so that I could perform the procedure.  Without the use of a surgical assistant during this procedure I feel that the outcome may have been compromised or would have been suboptimal or at risk for complications.    IMPLANTS:  Implant Name Type Inv. Item Serial No.  Lot No. LRB No. Used Action   DEV CONTRL TISS STRATAFIX SYMM PDS PLUS FAMILIA CT-1 60CM - [id number] Implant DEV CONTRL TISS STRATAFIX SYMM PDS PLUS FAMILIA CT-1 60CM  [employer]  DIV OF  AND SANDRA [facility] Right 1 Implanted   DEV CONTRL TISS STRATAFIXSPIRALMNCRYL PLSPS2 REV3/0 45CM - [id number] Implant DEV CONTRL TISS STRATAFIXSPIRALMNCRYL PLSPS2 REV3/0 45CM  [employer]  DIV [facility] AND SANDRA [facility] Right 1 Implanted   HD FEM/HIP OXINIUM TPR 12/14 36MM PLS0 - [id number] Implant HD FEM/HIP OXINIUM TPR 12/14 36MM PLS0  [employer] [id number] Right 1 Implanted   STEM FEM/HIP POLARSTEM W/COLR STD SZ2 - [id number] Implant STEM FEM/HIP POLARSTEM W/COLR STD SZ2  [employer] [id number] Right 1 Implanted   SHLL ACET R3 3H STD 52MM - [id number] Implant SHLL ACET R3 3H STD 52MM  [employer] [id number] Right 1 Implanted   LINER ACET R3 XLPE 20D [id number] - [id number] Implant LINER ACET R3 XLPE 20D [id number]  [employer] [id number] Right 1 Implanted   SCRW SPH HD REFLECTION 6.5X25MM - [id number] Implant SCRW  SPH HD REFLECTION 6.5X25MM  [employer] [id number] Right 1 Implanted   SCRW SPH HD REFLECTION 6.5X25MM - [id number] Implant SCRW SPH HD REFLECTION 6.5X25MM  [employer] [id number] Right 1 Implanted       Estimated Blood Loss: 200 mL  Specimens : none  Complications: none    DESCRIPTION OF PROCEDURE:    The patient was taken to the operating room and placed in the supine position. A sequential compression device was carefully placed on the non-operative leg. Preoperative antibiotics were administered. Surgical time out was performed. After adequate induction of anesthesia the patient was then transferred onto the  table and positioned appropriately in the lateral decubitus position. The hip was then prepped and draped in the usual sterile fashion.    A posterior lateral surgical incision was then made.  The gluteus venkat fascia was then divided.  The gluteus venkat muscle was then bluntly dissected.  A Charnley self-retaining retractor was then placed.  A posterior capsulotomy was then performed.  The superior limb was divided in line with the piriformis tendon.  The hip was then dislocated.  There were end-stage arthritic findings.  The femoral neck osteotomy was performed according to the level dictated by the template.  The acetabulum was exposed with standard retractors.  The labrum and pulvinar were then excised.  The cup was then medialized with the starting acetabular reamer to the medial wall.  I then progressively reamed up to the appropriate size in 45°of abduction and 20° of anteversion. Line to line reaming was performed. At this point the bone was nicely prepared.  There was excellent bleeding bone. We then impacted the acetabular component in 45 of abduction and 20 of anteversion the cup was stable.  Per routine we augmented the fixation with 2 screws in the posterior and superior quadrant  The final liner was then placed and it locked in nicely.  At this point we injected the hip with  anesthetic cocktail solution.  We then turned our attention to the femur.   Standard retractors were placed to expose the femur.  The box osteotome was used to create a starting point.  The canal finder was then used to sound the canal.  The lateralizing reamer was then used to lateralize into the greater trochanter.  We progressively reamed and broached until the broach was very solid to axial and rotational stresses.  At this point we placed the trial neck and head.  Hip was very stable to flexion and internal rotation as well as extension and external rotation.  The leg lengths were measured to be equal.  We then removed the trial components, copiously irrigated the hip, and then impacted the final stem.  At this point we then trialed and chose the final head. The head was placed on a clean dry taper.  The leg lengths were again measured to be equal.  At this point the hip was copiously irrigated with pulse lavage and the capsule was then reapproximated with #1 PDS suture, and the remainder of the hip was closed in multiple layers in standard fashion..  There was excellent hemostasis. We placed a one-eighth inch Hemovac drain.  Sterile dressings were applied. At the end of the case, the sponge and needle counts were reported as being correct. There were no known complications. The patient was then transported to the recovery room.      Reg Parisi M.D.  12/21/2022

## 2022-12-22 VITALS
TEMPERATURE: 97.6 F | HEART RATE: 79 BPM | SYSTOLIC BLOOD PRESSURE: 146 MMHG | WEIGHT: 240.3 LBS | BODY MASS INDEX: 41.03 KG/M2 | OXYGEN SATURATION: 94 % | DIASTOLIC BLOOD PRESSURE: 81 MMHG | HEIGHT: 64 IN | RESPIRATION RATE: 16 BRPM

## 2022-12-22 LAB
HCT VFR BLD AUTO: 34.1 % (ref 34–46.6)
HGB BLD-MCNC: 11 G/DL (ref 12–15.9)
HOLD SPECIMEN: NORMAL

## 2022-12-22 PROCEDURE — 99024 POSTOP FOLLOW-UP VISIT: CPT | Performed by: NURSE PRACTITIONER

## 2022-12-22 PROCEDURE — 97161 PT EVAL LOW COMPLEX 20 MIN: CPT

## 2022-12-22 PROCEDURE — 85014 HEMATOCRIT: CPT | Performed by: NURSE PRACTITIONER

## 2022-12-22 PROCEDURE — 85018 HEMOGLOBIN: CPT | Performed by: NURSE PRACTITIONER

## 2022-12-22 PROCEDURE — G0378 HOSPITAL OBSERVATION PER HR: HCPCS

## 2022-12-22 PROCEDURE — 25010000002 CEFAZOLIN IN DEXTROSE 2-4 GM/100ML-% SOLUTION: Performed by: NURSE PRACTITIONER

## 2022-12-22 PROCEDURE — 97110 THERAPEUTIC EXERCISES: CPT

## 2022-12-22 RX ORDER — CEFDINIR 300 MG/1
300 CAPSULE ORAL EVERY 12 HOURS SCHEDULED
Qty: 14 CAPSULE | Refills: 0 | Status: SHIPPED | OUTPATIENT
Start: 2022-12-22 | End: 2022-12-29

## 2022-12-22 RX ORDER — CEFDINIR 300 MG/1
300 CAPSULE ORAL EVERY 12 HOURS SCHEDULED
Status: DISCONTINUED | OUTPATIENT
Start: 2022-12-22 | End: 2022-12-22 | Stop reason: HOSPADM

## 2022-12-22 RX ADMIN — CEFAZOLIN SODIUM 2 G: 2 INJECTION, SOLUTION INTRAVENOUS at 06:25

## 2022-12-22 RX ADMIN — HYDROCODONE BITARTRATE AND ACETAMINOPHEN 1 TABLET: 7.5; 325 TABLET ORAL at 08:46

## 2022-12-22 RX ADMIN — ASPIRIN 81 MG: 81 TABLET, COATED ORAL at 08:24

## 2022-12-22 NOTE — PLAN OF CARE
Goal Outcome Evaluation:  Plan of Care Reviewed With: patient           Outcome Evaluation: Pt s/p day 1 RTH, pt A/Ox4,  up with use of walker, voiding via BRP. RA, VSS, IV abx  administered as ordered, no adverse reaction noted. Pt states no c/o pain. Ice therapy applied throughout shift. N/V intact. Safety maintained.  Plan is to d/c home today.

## 2022-12-22 NOTE — THERAPY DISCHARGE NOTE
Patient Name: Rylee Pennington  : 1960    MRN: [id number]                              Today's Date: 2022       Admit Date: 2022    Visit Dx:     ICD-10-CM ICD-9-CM   1. Status post total hip replacement, right  Z96.641 V43.64   2. Primary osteoarthritis of right hip  M16.11 715.15     Patient Active Problem List   Diagnosis   • Arteriovenous malformation (AVM)   • BASILIO (dyspnea on exertion)   • Arterio-venous malformation   • Primary localized osteoarthritis of left hip   • Greater trochanteric bursitis of left hip   • Pain   • Primary osteoarthritis of right hip     Past Medical History:   Diagnosis Date   • Abrasion of left lower extremity     HAS APPT WITH DR WOOD AFTER PAT APPT TO DISCUSS ABRASION   • Anxiety     TOOK MEDS IN THE PAST-NONE LAST TWO YRS   • Arthritis    • AVM (arteriovenous malformation)    • Fracture, fibula    • Fracture, tibia and fibula    • GERD (gastroesophageal reflux disease)    • Hip pain    • History of anemia    • History of kidney stones    • Hypertension     HAS BEEN TREATED IN PAST WITH MEDS   • Left hip pain    • Shortness of breath on exertion    • Sleep apnea     NO C-PAP IN USE     Past Surgical History:   Procedure Laterality Date   • APPENDECTOMY     • CARDIAC CATHETERIZATION N/A 2018    Procedure: Right Heart Cath;  Surgeon: Vladislav Monsivais MD;  Location: [facility] INVASIVE LOCATION;  Service: Cardiovascular   • CARDIAC CATHETERIZATION N/A 2018    Procedure: Left Heart Cath;  Surgeon: Vladislav Monsivais MD;  Location: [facility] CATH INVASIVE LOCATION;  Service: Cardiovascular   • CARDIAC CATHETERIZATION N/A 2018    Procedure: Coronary angiography;  Surgeon: Vladislav Monsivais MD;  Location: [facility] CATH INVASIVE LOCATION;  Service: Cardiovascular   • CARDIAC CATHETERIZATION N/A 2018    Procedure: Left ventriculography;  Surgeon: Vladislav Monsivais MD;  Location: [facility] CATH INVASIVE LOCATION;  Service: Cardiovascular   • COLONOSCOPY     • CYSTOSCOPY LITHOLAPAXY  BLADDER STONE EXTRACTION     • FRACTURE SURGERY Right 1995    right leg   • GASTRIC BANDING REMOVAL  08/2006   • HYSTERECTOMY  1984   • INTERVENTIONAL RADIOLOGY PROCEDURE Bilateral 03/09/2018    Procedure: Pulmonary Angiogram;  Surgeon: Vladislav Monsivais MD;  Location: [facility] CATH INVASIVE LOCATION;  Service: Cardiovascular   • JOINT REPLACEMENT     • LAPAROSCOPIC GASTRIC BANDING  08/2004   • THORACOSCOPY Left 06/15/2018    Procedure: BRONCHOSCOPY, LEFT VIDEO ASSISTED THORACOSCOPY WITH DAVINCI ROBOT WITH LIGATION AV MALFORMATION INTERCOSTAL NERVE BLOCK WITH CRYOPROBE;  Surgeon: Darryl Briseno III, MD;  Location: [facility] MAIN OR;  Service: DaVinci   • TOTAL HIP ARTHROPLASTY Left 09/12/2022    Procedure: TOTAL HIP ARTHROPLASTY;  Surgeon: Reg Parisi MD;  Location: [facility] OR [facility];  Service: Orthopedics;  Laterality: Left;   • TOTAL HIP ARTHROPLASTY Right 12/21/2022    Procedure: TOTAL HIP ARTHROPLASTY;  Surgeon: Reg Parisi MD;  Location: [facility] OR [facility];  Service: Orthopedics;  Laterality: Right;      General Information     Row Name 12/22/22 1241          Physical Therapy Time and Intention    Document Type discharge evaluation/summary  Pt. is s/p Right THR (Posterior)  -MS     Mode of Treatment physical therapy;individual therapy  -MS     Row Name 12/22/22 1241          General Information    Patient Profile Reviewed yes  -MS     Prior Level of Function independent:  -MS     Existing Precautions/Restrictions hip, posterior  -MS     Barriers to Rehab none identified  -MS     Row Name 12/22/22 1241          Cognition    Orientation Status (Cognition) oriented x 3  -MS     Row Name 12/22/22 1241          Safety Issues, Functional Mobility    Comment, Safety Issues/Impairments (Mobility) Gait belt used for safety.  -MS           User Key  (r) = Recorded By, (t) = Taken By, (c) = Cosigned By    Initials Name Provider Type    MS Deborah Doug ELISE, PT Physical Therapist               Mobility     Row Name 12/22/22 1240           Bed Mobility    Bed Mobility supine-sit;sit-supine  -MS     Supine-Sit Centre (Bed Mobility) independent  -MS     Row Name 12/22/22 1242          Sit-Stand Transfer    Sit-Stand Centre (Transfers) independent  -MS     Assistive Device (Sit-Stand Transfers) walker, front-wheeled  -MS     Row Name 12/22/22 1242          Gait/Stairs (Locomotion)    Centre Level (Gait) standby assist  -MS     Assistive Device (Gait) walker, front-wheeled  -MS     Distance in Feet (Gait) 100 feet  -MS     Centre Level (Stairs) stand by assist  -MS     Number of Steps (Stairs) 2 (backwards with use of Rwx)  -MS     Ascending Technique (Stairs) step-to-step  -MS     Descending Technique (Stairs) step-to-step  -MS     Row Name 12/22/22 1242          Mobility    Extremity Weight-bearing Status right lower extremity  -MS     Right Lower Extremity (Weight-bearing Status) weight-bearing as tolerated (WBAT)  -MS           User Key  (r) = Recorded By, (t) = Taken By, (c) = Cosigned By    Initials Name Provider Type    Doug Ramon, PT Physical Therapist               Obj/Interventions     Row Name 12/22/22 1242          Range of Motion Comprehensive    Comment, General Range of Motion BUE/LLE (WFL's)  -MS     Row Name 12/22/22 1242          Strength Comprehensive (MMT)    Comment, General Manual Muscle Testing (MMT) Assessment BUE/LLE (>/= 3/5)  -MS     Row Name 12/22/22 1242          Motor Skills    Therapeutic Exercise --  Right THR ther. ex. program x 10 reps completed  -MS           User Key  (r) = Recorded By, (t) = Taken By, (c) = Cosigned By    Initials Name Provider Type    Doug Ramon, PT Physical Therapist               Goals/Plan    No documentation.                Clinical Impression     Row Name 12/22/22 1243          Pain    Pretreatment Pain Rating 1/10  -MS     Posttreatment Pain Rating 1/10  -MS     Pain Location - Side/Orientation Right  -MS     Pain Location - hip  -MS     Row Name  12/22/22 1243          Plan of Care Review    Plan of Care Reviewed With patient  -MS     Row Name 12/22/22 1243          Positioning and Restraints    Pre-Treatment Position in bed  -MS     Post Treatment Position chair  -MS     In Chair notified nsg;reclined;sitting;call light within reach;encouraged to call for assist;exit alarm on  -MS           User Key  (r) = Recorded By, (t) = Taken By, (c) = Cosigned By    Initials Name Provider Type    Doug Ramon, PT Physical Therapist               Outcome Measures     Row Name 12/22/22 1244 12/22/22 0828       How much help from another person do you currently need...    Turning from your back to your side while in flat bed without using bedrails? 4  -MS 4  -EK    Moving from lying on back to sitting on the side of a flat bed without bedrails? 4  -MS 3  -EK    Moving to and from a bed to a chair (including a wheelchair)? 4  -MS 3  -EK    Standing up from a chair using your arms (e.g., wheelchair, bedside chair)? 4  -MS 3  -EK    Climbing 3-5 steps with a railing? 3  -MS 3  -EK    To walk in hospital room? 3  -MS 3  -EK    AM-PAC 6 Clicks Score (PT) 22  -MS 19  -EK    Highest level of mobility 7 --> Walked 25 feet or more  -MS 6 --> Walked 10 steps or more  -EK    Row Name 12/22/22 1244          Functional Assessment    Outcome Measure Options AM-PAC 6 Clicks Basic Mobility (PT)  -MS           User Key  (r) = Recorded By, (t) = Taken By, (c) = Cosigned By    Initials Name Provider Type    Doug Ramon, PT Physical Therapist    Rosemarie Granger, RN Registered Nurse              Physical Therapy Education     Title: PT OT SLP Therapies (Resolved)     Topic: Physical Therapy (Resolved)     Point: Mobility training (Resolved)     Learning Progress Summary           Patient Acceptance, E,D, VU,DU by MS at 12/22/2022 1245                   Point: Home exercise program (Resolved)     Learning Progress Summary           Patient Acceptance, E,D, VU,DU by MS at  12/22/2022 1245                   Point: Body mechanics (Resolved)     Learning Progress Summary           Patient Acceptance, E,D, VU,DU by MS at 12/22/2022 1245                   Point: Precautions (Resolved)     Learning Progress Summary           Patient Acceptance, E,D, VU,DU by MS at 12/22/2022 1245                               User Key     Initials Effective Dates Name Provider Type Discipline    MS 06/16/21 -  Doug Cabrera, PT Physical Therapist PT              PT Recommendation and Plan     Plan of Care Reviewed With: patient     Time Calculation:    PT Charges     Row Name 12/22/22 1246             Time Calculation    Start Time 0800  -MS      Stop Time 0815  -MS      Time Calculation (min) 15 min  -MS      PT Received On 12/22/22  -MS         Time Calculation- PT    Total Timed Code Minutes- PT 14 minute(s)  -MS            User Key  (r) = Recorded By, (t) = Taken By, (c) = Cosigned By    Initials Name Provider Type    MS Doug Cabrera, PT Physical Therapist              Therapy Charges for Today     Code Description Service Date Service Provider Modifiers Qty    21691693710 HC PT EVAL LOW COMPLEXITY 1 12/22/2022 Doug Cabrera, PT GP 1    59660715843 HC PT THER PROC EA 15 MIN 12/22/2022 Doug Cabrera, PT GP 1          PT G-Codes  Outcome Measure Options: AM-PAC 6 Clicks Basic Mobility (PT)  AM-PAC 6 Clicks Score (PT): 22    PT Discharge Summary  Anticipated Discharge Disposition (PT): home with assist, home with home health  Reason for Discharge: Discharge from facility  Discharge Destination: Home with assist, Home with home health    Doug Cabrera PT  12/22/2022

## 2022-12-22 NOTE — DISCHARGE PLACEMENT REQUEST
"Nereida Mathur (62 y.o. Female)     Date of Birth   1960    Social Security Number       Address   [address] [id number]    Home Phone   [phone]    MRN   [id number]       Anabaptism   Other    Marital Status                               Admission Date   12/21/22    Admission Type   Elective    Admitting Provider   Reg Parisi MD    Attending Provider   Reg Parisi MD    Department, Room/Bed   [address], P782/1       Discharge Date       Dis[place] Disposition   [place] [facility] [place]                               Attending Provider: Reg Parisi MD    Allergies: No Known Allergies    Isolation: None   Infection: None   Code Status: Prior    Ht: 162.6 cm (64.02\")   Wt: 109 kg (240 lb 4.8 oz)    Admission Cmt: None   Principal Problem: Primary osteoarthritis of right hip [M16.11]                 Active Insurance as of 12/21/2022     Primary Coverage     Payor Plan Insurance Group Employer/Plan Group    [employer] EMPL[employer] [id number]     Payor Plan Address Payor Plan Phone Number Payor Plan Fax Number Effective Dates    [address] [phone]  1/1/2022 - None Entered    [facility] [id number]       Subscriber Name Subscriber Birth Date Member ID       NEREIDA MATHUR 1960 [id number]                 Emergency Contacts      (Rel.) Home Phone Work Phone Mobile Phone    Clau Back (Daughter) [phone] -- [phone]    HELEN HUGGINS (Son) [phone] -- [phone]          " -unknown baseline, creatinine worse overnight  -To IR for bilateral nephrostomy tube placement

## 2022-12-22 NOTE — PLAN OF CARE
Goal Outcome Evaluation:  Plan of Care Reviewed With: patient         Pt. is currently independent/SBA with functional mobility and has no further questions/concerns regarding functional mobility or home safety.  Encouraged pt. to continue ther. ex. program 2-3 x's daily and to ambulate every 1-2 hours once home. Plan for discharge home this date.  Will sign off.      Patient was wearing a face mask during this therapy encounter. Therapist used appropriate personal protective equipment including eye protection, mask, and gloves.  Mask used was standard procedure mask. Appropriate PPE was worn during the entire therapy session. Hand hygiene was completed before and after therapy session. Patient is not in enhanced droplet precautions.

## 2022-12-22 NOTE — DISCHARGE SUMMARY
Patient Name: Rylee Pennington  Patient YOB: 1960    Date of Admission:  12/21/2022  Date of Discharge:  12/22/2022  Discharge Diagnosis: [place] TOTAL HIP ARTHROPLASTY [80859] (TOTAL HIP ARTHROPLASTY)  Presenting Problem/History of Present Illness: Primary osteoarthritis of right hip [M16.11]  Pain [R52]  Admitting Physician: Dr Reg Parisi  Consults:   Consults     No orders found for last 30 day(s).          DETAILS OF HOSPITAL STAY:  Patient is a 62 y.o. female was admitted to the floor following the above procedure and underwent an uncomplicated hospital stay.  Patient did well with physical therapy and was ambulating well without problems.  On the day of discharge the wound was clean, dry and intact and calf was soft and nontender and Homans sign was negative.  Patient was tolerating   Diet Instructions     Advance Diet as Tolerated      May advance diet as tolerated while in hospital.    Diet:      Diet Texture / Consistency: Regular       without problems.  Patient will be discharged home.    Condition on Discharge:  Stable    Vital Signs  Temp:  [97.6 °F (36.4 °C)-98.3 °F (36.8 °C)] 97.6 °F (36.4 °C)  Heart Rate:  [63-98] 79  Resp:  [15-16] 16  BP: (124-165)/() 146/81    LABS:   Admission on 12/21/2022   Component Date Value Ref Range Status   • ABO Type 12/21/2022 A   Final   • RH type 12/21/2022 Positive   Final   • Antibody Screen 12/21/2022 Negative   Final   • T&S Expiration Date 12/21/2022 12/24/2022 11:59:59 PM   Final   • Hemoglobin 12/22/2022 11.0 (L)  12.0 - 15.9 g/dL Final   • Hematocrit 12/22/2022 34.1  34.0 - 46.6 % Final   • Extra Tube 12/22/2022 Hold for add-ons.   Final    Auto resulted.       No results found.        Discharge Medications     Discharge Medications      New Medications      Instructions Start Date   aspirin 81 MG EC tablet   Take 1 tab po bid x 14 days; then take 1 tab po daily x 28 days      cefdinir 300 MG capsule  Commonly known as: OMNICEF   300 mg, Oral,  Every 12 Hours Scheduled      HYDROcodone-acetaminophen 7.5-325 MG per tablet  Commonly known as: NORCO   1 tablet, Oral, Every 4 Hours PRN      meloxicam 15 MG tablet  Commonly known as: MOBIC   15 mg, Oral, Daily, Dispense 14 days supply      ondansetron 4 MG tablet  Commonly known as: Zofran   4 mg, Oral, Every 8 Hours PRN      pantoprazole 40 MG EC tablet  Commonly known as: PROTONIX   40 mg, Oral, Daily      polyethylene glycol 17 GM/SCOOP powder  Commonly known as: MIRALAX   17 g, Oral, 2 Times Daily, Dispense 7 day supply         Stop These Medications    Chlorhexidine Gluconate Cloth 2 % pads     TYLENOL PO            Activity at Discharge:   Activity Instructions     Discharge Activity      Dr Reg Parisi  [address] [facility], [address]  [place] [address]  [phone]      Discharge Information (HIP REPLACEMENT)    The first 2-3 days after surgery your pain will likely be diminished due to medications that were given to you during surgery. It is very important to follow a few simple instructions to continue with good pain control after arriving home.    Activity control :  DON'T OVERDO IT, small amounts of activity on a frequent basis. You are encouraged to get up and move around  for  short periods but do not engage in any prolonged walking, standing or activity until cleared by your physical therapist. You may walk short distances frequently.  You will be notified while in hospital if you have any specific hip precautions  Ice - you should ice the hip as much as possible over the first week or two.  Placing a clean hand towel or pillowcase between the icepack and skin will allow you to ice for extended periods.   Pain Medication - Take some pain medication (1-2 tablets) on a regular schedule (every 4-6 hours) for the first 72 hours after arriving home. This is important to keep the pain under control as the operative medication begins to wear off. Make sure to have food in your stomach when taking the  medication. If you develop any nausea with the pain medication, try taking Zofran 30 minutes before taking the pain pills. After the first 72 hours you can take the medication as needed based on your pain.  REMEMBER - PAIN MEDICATION WORKS BETTER AT PREVENTING PAIN THAN IT DOES IN CATCHING UP TO PAIN ONCE IT INCREASES.  Physical therapy:  Follow the instructions of your physical therapist.  You may put full weight on your leg unless instructed otherwise.  If you have hip precautions, this will be discussed with you in the hospital. Continue to follow any hip precautions / restrictions until your follow-up appointment.   If you have been told that you have no hip precautions then you may sit / lay/ bend in any position within reason.  If you lay on your side then you should place a pillow between your legs for the first 2 weeks.  For all patients - “listen to your hip” - meaning don't force your hip into an uncomfortable position.    Showering :   The waterproof dressing will allow you to shower as soon as you get home.    The dressing should be left in place.  After 7 days the suction unit will stop functioning and at that time you may disconnect the suction tube.    If the dressing becomes disloged or saturated it should be changed.  If you have a home health nurse or therapist they can be contacted to assist with dressing change or repair.  Please refer to the CLAUDY information sheet if you have any questions about the dressing.  You may also call the CLAUDY dressing hotline for questions related to the dressing (1-109.234.2249). If there still other problems or questions related to the dressing despite these measures then you can contact Layne at our office [phone]    Driving : No driving during the first 2 weeks post surgery. After the 2 week visit, Kiersten Jin, or Radames will determine when you’re ready to drive.    Blood Clot (DVT) Prevention:  Keep legs elevated when possible to limit swelling  Perform  “calf pump” exercises regularly to encourage blood flow through the calf veins.  Most patients will be discharged on asprin 81mg (full strength) twice daily for 2 weeks, then once daily for four weeks. Some high risk patients may require Coumadin, Xarelto, or other blood thinners.    Follow-Up Visit: After arriving home, please call Dr Parisi’s office ([phone]) to arrange your follow-up appointment. This visit will be approximately 2 weeks post-op.     Your Implant: Your hip implant is made of the finest materials available. It is made by [employer]. For more info visit [email or web address]. There are four components:  Polarstem titanium femoral stem  R3 titanium acetabular cup  Oxidized zirconium femoral head (Oxinium ™)  Crosslinked polyethylene acetabular insert    Patient May Shower; No Tub Baths, Pools or Hot Tubs      Weight Bearing As Tolerated            Discharge Instructions:   1)  Patient is to continue with physical therapy exercises daily and continue working with the physical therapist as ordered.  2)  Follow NO hip precautions.   3)  Patient may weight bear as tolerated.   4)  Apply ice regularly. You may ice for long periods of time as long as ice is not directly on the skin. Patient instructed on frequent calf pumping exercises.  Patient also instructed on incentive spirometer during hospitalization and encouraged to continue to use at home regularly.   5)  The dressing should be left in place. If waterproof dressing is intact the patient may shower immediately following discharge. If the dressing becomes disloged or saturated it should be changed. Please refer to the CLAUDY information sheet if you have any questions about the dressing.  If you have a home health nurse or therapist they can be contacted to assist with dressing change or repair. You may also call the CLAUDY dressing hotline for questions related to the dressing (1-799.894.1583). If there are still other problems or  questions related to the dressing despite these measures then you can contact Layne at our office [phone].   6)  Follow up appointment in 2 weeks - patient to call the office at [phone] to schedule. 7)  Patient will be discharged on aspirin 81mg BID x weeks, then daily x 4weeks and Omnicef 300mg BID x 7 days.     Complete Discharge Diagnosis:    Patient Active Problem List   Diagnosis   • Arteriovenous malformation (AVM)   • BASILIO (dyspnea on exertion)   • Arterio-venous malformation   • Primary localized osteoarthritis of left hip   • Greater trochanteric bursitis of left hip   • Pain   • Primary osteoarthritis of right hip           Follow-up Appointments  Future Appointments   Date Time Provider Department Center   1/5/2023  2:40 PM Reg Parisi MD [facility] [address] [facility]  12/22/22  07:57 EST

## 2022-12-22 NOTE — PLAN OF CARE
Goal Outcome Evaluation:  Plan of Care Reviewed With: patient   All goals met, pt safe for d/c     Progress: improving

## 2022-12-23 NOTE — PROGRESS NOTES
Continued Stay Note  [facility]     Patient Name: Rylee Pennington  MRN: [id number]  Today's Date: 12/23/2022    Admit Date: 12/21/2022    Plan: Outpatient PT   Discharge Plan     Row Name 12/23/22 1428       Plan    Plan Outpatient PT    Patient/Family in Agreement with Plan yes    Plan Comments Spoke with pt, verified correct information on facesheet and explained the role of CCP. Pt lives in [place], [facility] does not have any staff at this time to service that area. Referrals then sent to [facility], [facility] and [facility] , all stating they can not accept. Discussed option of going to outpatient PT, pt is agreeable. Pt has Outpatient PT set up on 12/28 and states she is familiar and doing her exercises at home until she goes to outpatient. No further needs identified.    Final Discharge Disposition Code 01 - home or self-care    Final Note Outpatient PT               Discharge Codes    No documentation.               Expected Discharge Date and Time     Expected Discharge Date Expected Discharge Time    Dec 22, 2022             Dian Shearer RN

## 2022-12-28 ENCOUNTER — TELEPHONE (OUTPATIENT)
Dept: ORTHOPEDIC SURGERY | Facility: HOSPITAL | Age: 62
End: 2022-12-28

## 2022-12-28 NOTE — TELEPHONE ENCOUNTER
Attempted to reach Ms. Pennington at this time to see how she is doing as she is 1 week SP TKA. No answer at this time. Unable to leave .

## 2023-01-05 ENCOUNTER — OFFICE VISIT (OUTPATIENT)
Dept: ORTHOPEDIC SURGERY | Facility: CLINIC | Age: 63
End: 2023-01-05
Payer: COMMERCIAL

## 2023-01-05 VITALS — TEMPERATURE: 98.6 F | BODY MASS INDEX: 41.86 KG/M2 | WEIGHT: 245.2 LBS | HEIGHT: 64 IN

## 2023-01-05 DIAGNOSIS — Z96.641 STATUS POST RIGHT HIP REPLACEMENT: Primary | ICD-10-CM

## 2023-01-05 PROCEDURE — 99024 POSTOP FOLLOW-UP VISIT: CPT | Performed by: ORTHOPAEDIC SURGERY

## 2023-01-05 PROCEDURE — 73501 X-RAY EXAM HIP UNI 1 VIEW: CPT | Performed by: ORTHOPAEDIC SURGERY

## 2023-01-05 NOTE — PROGRESS NOTES
Rylee Pennington : 1960 MRN: [id number] DATE: 2023    DIAGNOSIS: 2 week follow up right total hip     SUBJECTIVE:Patient returns today for 2 week follow up of right total hip replacement. Patient reports doing well with no unusual complaints. Appears to be progressing appropriately.    OBJECTIVE:   Exam:. The incision is healing appropriately. No sign of infection. Range of motion is progressing as expected. The calf is soft and nontender with a negative Homans sign.    DIAGNOSTIC STUDIES  Xrays: 2 views of the right hip (AP pelvis and lateral right hip) were ordered and reviewed for evaluation of recent hip replacement. They demonstrate a well positioned, well aligned hip replacement without complicating factors noted. In comparison with previous films there has been interval implant placement.    ASSESSMENT: 2 week status post right hip replacement.    PLAN: 1) Staples removed and steri strips applied   2) PT exercises   3) Discontinue JENNIFER hose   4) Continue ice PRN   5) WBAT   6) aspirin 81 mg orally every day for 1 month   7) Follow up in 6 weeks with repeat Xrays of right hip (2views)    Reg Parisi MD  2023

## 2023-02-16 ENCOUNTER — OFFICE VISIT (OUTPATIENT)
Dept: ORTHOPEDIC SURGERY | Facility: CLINIC | Age: 63
End: 2023-02-16
Payer: COMMERCIAL

## 2023-02-16 VITALS — HEIGHT: 64 IN | TEMPERATURE: 96.5 F | RESPIRATION RATE: 16 BRPM | WEIGHT: 250 LBS | BODY MASS INDEX: 42.68 KG/M2

## 2023-02-16 DIAGNOSIS — R52 PAIN: Primary | ICD-10-CM

## 2023-02-16 DIAGNOSIS — Z96.641 STATUS POST RIGHT HIP REPLACEMENT: ICD-10-CM

## 2023-02-16 PROCEDURE — 73502 X-RAY EXAM HIP UNI 2-3 VIEWS: CPT | Performed by: NURSE PRACTITIONER

## 2023-02-16 PROCEDURE — 99024 POSTOP FOLLOW-UP VISIT: CPT | Performed by: NURSE PRACTITIONER

## 2023-02-16 RX ORDER — CEPHALEXIN 500 MG/1
CAPSULE ORAL
Qty: 4 CAPSULE | Refills: 5 | Status: SHIPPED | OUTPATIENT
Start: 2023-02-16

## 2023-02-16 NOTE — PROGRESS NOTES
Rylee Pennington : 1960 MRN: [id number] DATE: 2023    DIAGNOSIS: 8 week follow up right total hip Posterior Lateral Approach    SUBJECTIVE:Patient returns today for 8 week follow up of right total hip replacement. Patient reports doing well with no unusual complaints. Appears to be progressing appropriately and is off a cane.  Patient reports that she does have some mild stiffness upon ambulating otherwise she is doing okay.  Patient states she is already back to work.  She is done with physical therapy and just doing home exercises now.    OBJECTIVE:   Exam:. The incision is healed. No sign of infection. Range of motion is progressing as expected. The calf is soft and nontender with a negative Homans sign. Strength progressing    DIAGNOSTIC STUDIES  Xrays: 2 views of the right hip (AP pelvis and lateral right hip) were ordered and reviewed for evaluation of recent hip replacement. They demonstrate a well positioned, well aligned hip replacement without complicating factors noted. In comparison with previous films there has been interval implant placement.    ASSESSMENT: 8 week status post right hip replacement Posterior Lateral Approach    PLAN: 1) Activity as tolerated   2) Continue hip strengthening exercises    3) Follow up 1 year post-op with repeat Xrays of bilateral hips    Radames Lopes, MATHEW  2023

## 2023-05-02 ENCOUNTER — TRANSCRIBE ORDERS (OUTPATIENT)
Dept: BONE DENSITY | Facility: HOSPITAL | Age: 63
End: 2023-05-02
Payer: COMMERCIAL

## 2023-05-02 DIAGNOSIS — M85.80 OSTEOPENIA, UNSPECIFIED LOCATION: Primary | ICD-10-CM

## 2023-05-03 ENCOUNTER — APPOINTMENT (OUTPATIENT)
Dept: GENERAL RADIOLOGY | Facility: HOSPITAL | Age: 63
End: 2023-05-03
Payer: COMMERCIAL

## 2023-05-03 ENCOUNTER — HOSPITAL ENCOUNTER (EMERGENCY)
Facility: HOSPITAL | Age: 63
Discharge: HOME | End: 2023-05-03
Attending: EMERGENCY MEDICINE
Payer: COMMERCIAL

## 2023-05-03 ENCOUNTER — APPOINTMENT (OUTPATIENT)
Dept: CT IMAGING | Facility: HOSPITAL | Age: 63
End: 2023-05-03
Payer: COMMERCIAL

## 2023-05-03 VITALS
HEIGHT: 64 IN | SYSTOLIC BLOOD PRESSURE: 141 MMHG | WEIGHT: 230 LBS | HEART RATE: 90 BPM | TEMPERATURE: 98.3 F | DIASTOLIC BLOOD PRESSURE: 75 MMHG | BODY MASS INDEX: 39.27 KG/M2 | RESPIRATION RATE: 18 BRPM | OXYGEN SATURATION: 94 %

## 2023-05-03 DIAGNOSIS — N32.89 BLADDER DISTENTION: ICD-10-CM

## 2023-05-03 DIAGNOSIS — R10.11 RIGHT UPPER QUADRANT ABDOMINAL PAIN: Primary | ICD-10-CM

## 2023-05-03 LAB
ALBUMIN SERPL-MCNC: 4.1 G/DL (ref 3.5–5.2)
ALBUMIN/GLOB SERPL: 1.2 G/DL
ALP SERPL-CCNC: 104 U/L (ref 39–117)
ALT SERPL W P-5'-P-CCNC: 17 U/L (ref 1–33)
ANION GAP SERPL CALCULATED.3IONS-SCNC: 12.6 MMOL/L (ref 5–15)
AST SERPL-CCNC: 10 U/L (ref 1–32)
BACTERIA UR QL AUTO: ABNORMAL /HPF
BASOPHILS # BLD AUTO: 0.04 10*3/MM3 (ref 0–0.2)
BASOPHILS NFR BLD AUTO: 0.5 % (ref 0–1.5)
BILIRUB SERPL-MCNC: 0.3 MG/DL (ref 0–1.2)
BILIRUB UR QL STRIP: NEGATIVE
BUN SERPL-MCNC: 20 MG/DL (ref 8–23)
BUN/CREAT SERPL: 29.4 (ref 7–25)
CALCIUM SPEC-SCNC: 10.7 MG/DL (ref 8.6–10.5)
CHLORIDE SERPL-SCNC: 96 MMOL/L (ref 98–107)
CLARITY UR: ABNORMAL
CO2 SERPL-SCNC: 27.4 MMOL/L (ref 22–29)
COLOR UR: YELLOW
CREAT SERPL-MCNC: 0.68 MG/DL (ref 0.57–1)
DEPRECATED RDW RBC AUTO: 48.7 FL (ref 37–54)
EGFRCR SERPLBLD CKD-EPI 2021: 97.4 ML/MIN/1.73
EOSINOPHIL # BLD AUTO: 0.22 10*3/MM3 (ref 0–0.4)
EOSINOPHIL NFR BLD AUTO: 3 % (ref 0.3–6.2)
ERYTHROCYTE [DISTWIDTH] IN BLOOD BY AUTOMATED COUNT: 14.7 % (ref 12.3–15.4)
GEN 5 2HR TROPONIN T REFLEX: 9 NG/L
GLOBULIN UR ELPH-MCNC: 3.5 GM/DL
GLUCOSE SERPL-MCNC: 101 MG/DL (ref 65–99)
GLUCOSE UR STRIP-MCNC: NEGATIVE MG/DL
HCT VFR BLD AUTO: 40.6 % (ref 34–46.6)
HGB BLD-MCNC: 13 G/DL (ref 12–15.9)
HGB UR QL STRIP.AUTO: ABNORMAL
HYALINE CASTS UR QL AUTO: ABNORMAL /LPF
IMM GRANULOCYTES # BLD AUTO: 0.02 10*3/MM3 (ref 0–0.05)
IMM GRANULOCYTES NFR BLD AUTO: 0.3 % (ref 0–0.5)
KETONES UR QL STRIP: NEGATIVE
LEUKOCYTE ESTERASE UR QL STRIP.AUTO: ABNORMAL
LYMPHOCYTES # BLD AUTO: 2.36 10*3/MM3 (ref 0.7–3.1)
LYMPHOCYTES NFR BLD AUTO: 31.8 % (ref 19.6–45.3)
MCH RBC QN AUTO: 29 PG (ref 26.6–33)
MCHC RBC AUTO-ENTMCNC: 32 G/DL (ref 31.5–35.7)
MCV RBC AUTO: 90.4 FL (ref 79–97)
MONOCYTES # BLD AUTO: 0.6 10*3/MM3 (ref 0.1–0.9)
MONOCYTES NFR BLD AUTO: 8.1 % (ref 5–12)
NEUTROPHILS NFR BLD AUTO: 4.17 10*3/MM3 (ref 1.7–7)
NEUTROPHILS NFR BLD AUTO: 56.3 % (ref 42.7–76)
NITRITE UR QL STRIP: NEGATIVE
NRBC BLD AUTO-RTO: 0 /100 WBC (ref 0–0.2)
PH UR STRIP.AUTO: 5.5 [PH] (ref 4.5–8)
PLATELET # BLD AUTO: 226 10*3/MM3 (ref 140–450)
PMV BLD AUTO: 10 FL (ref 6–12)
POTASSIUM SERPL-SCNC: 3.6 MMOL/L (ref 3.5–5.2)
PROT SERPL-MCNC: 7.6 G/DL (ref 6–8.5)
PROT UR QL STRIP: NEGATIVE
RBC # BLD AUTO: 4.49 10*6/MM3 (ref 3.77–5.28)
RBC # UR STRIP: ABNORMAL /HPF
REF LAB TEST METHOD: ABNORMAL
SODIUM SERPL-SCNC: 136 MMOL/L (ref 136–145)
SP GR UR STRIP: 1.02 (ref 1–1.03)
SQUAMOUS #/AREA URNS HPF: ABNORMAL /HPF
TROPONIN T DELTA: -1 NG/L
TROPONIN T SERPL HS-MCNC: 10 NG/L
UROBILINOGEN UR QL STRIP: ABNORMAL
WBC # UR STRIP: ABNORMAL /HPF
WBC NRBC COR # BLD: 7.41 10*3/MM3 (ref 3.4–10.8)

## 2023-05-03 PROCEDURE — 93010 ELECTROCARDIOGRAM REPORT: CPT | Performed by: INTERNAL MEDICINE

## 2023-05-03 PROCEDURE — 74177 CT ABD & PELVIS W/CONTRAST: CPT

## 2023-05-03 PROCEDURE — 25510000001 IOPAMIDOL PER 1 ML: Performed by: EMERGENCY MEDICINE

## 2023-05-03 PROCEDURE — 84484 ASSAY OF TROPONIN QUANT: CPT | Performed by: EMERGENCY MEDICINE

## 2023-05-03 PROCEDURE — 85025 COMPLETE CBC W/AUTO DIFF WBC: CPT | Performed by: EMERGENCY MEDICINE

## 2023-05-03 PROCEDURE — 99284 EMERGENCY DEPT VISIT MOD MDM: CPT

## 2023-05-03 PROCEDURE — 93005 ELECTROCARDIOGRAM TRACING: CPT | Performed by: EMERGENCY MEDICINE

## 2023-05-03 PROCEDURE — 36415 COLL VENOUS BLD VENIPUNCTURE: CPT

## 2023-05-03 PROCEDURE — 80053 COMPREHEN METABOLIC PANEL: CPT | Performed by: EMERGENCY MEDICINE

## 2023-05-03 PROCEDURE — 71046 X-RAY EXAM CHEST 2 VIEWS: CPT

## 2023-05-03 PROCEDURE — 81001 URINALYSIS AUTO W/SCOPE: CPT | Performed by: EMERGENCY MEDICINE

## 2023-05-03 RX ORDER — SODIUM CHLORIDE 0.9 % (FLUSH) 0.9 %
10 SYRINGE (ML) INJECTION AS NEEDED
Status: DISCONTINUED | OUTPATIENT
Start: 2023-05-03 | End: 2023-05-03 | Stop reason: HOSPADM

## 2023-05-03 RX ORDER — NABUMETONE 500 MG/1
500 TABLET, FILM COATED ORAL 2 TIMES DAILY
Qty: 14 TABLET | Refills: 0 | Status: SHIPPED | OUTPATIENT
Start: 2023-05-03 | End: 2023-05-10

## 2023-05-03 RX ADMIN — IOPAMIDOL 100 ML: 755 INJECTION, SOLUTION INTRAVENOUS at 20:51

## 2023-05-03 NOTE — ED PROVIDER NOTES
Subjective   History of Present Illness  Rylee Pennington is a 63-year-old white female who presents secondary to right-sided abdominal pain since early this morning.  Patient had onset of above-mentioned pain approximately 2 AM.  The pain has been constant since onset.  It is worsened with movement of the torso as well as deep inspiration.  No recent illness or injury.  No fever, chills or body aches.  No cough or congestion.  No nausea, vomiting or diarrhea.  Patient went to local urgent care center this evening.  The medical provider there felt patient would be better off being evaluated in the emergency room.  Thus patient presents for evaluation.    History provided by:  Patient      Review of Systems   Constitutional: Negative.  Negative for fever.   HENT: Negative.  Negative for rhinorrhea.    Eyes: Negative.  Negative for redness.   Respiratory: Negative for cough.    Cardiovascular: Negative for chest pain.   Gastrointestinal: Positive for abdominal pain.   Endocrine: Negative.    Genitourinary: Negative.  Negative for difficulty urinating.   Musculoskeletal: Negative.  Negative for back pain.   Skin: Negative.  Negative for color change.   Neurological: Negative.  Negative for syncope.   Hematological: Negative.    Psychiatric/Behavioral: Negative.    All other systems reviewed and are negative.      Past Medical History:   Diagnosis Date   • Abrasion of left lower extremity     HAS APPT WITH DR WOOD AFTER PAT APPT TO DISCUSS ABRASION   • Anxiety     TOOK MEDS IN THE PAST-NONE LAST TWO YRS   • Arthritis    • AVM (arteriovenous malformation)    • Fracture, fibula    • Fracture, tibia and fibula    • GERD (gastroesophageal reflux disease)    • Hip pain    • History of anemia    • History of kidney stones    • Hypertension     HAS BEEN TREATED IN PAST WITH MEDS   • Left hip pain    • Shortness of breath on exertion    • Sleep apnea     NO C-PAP IN USE       No Known Allergies    Past Surgical History:    Procedure Laterality Date   • APPENDECTOMY     • CARDIAC CATHETERIZATION N/A 03/09/2018    Procedure: Right Heart Cath;  Surgeon: Vladislav Monsivais MD;  [facility];  Service: Cardiovascular   • CARDIAC CATHETERIZATION N/A 03/09/2018    Procedure: Left Heart Cath;  Surgeon: Vladislav Monsivais MD;  [facility];  Service: Cardiovascular   • CARDIAC CATHETERIZATION N/A 03/09/2018    Procedure: Coronary angiography;  Surgeon: Vladislav Monsivais MD;  [facility];  Service: Cardiovascular   • CARDIAC CATHETERIZATION N/A 03/09/2018    Procedure: Left ventriculography;  Surgeon: Vladislav Monsivais MD;  [facility];  Service: Cardiovascular   • COLONOSCOPY     • CYSTOSCOPY LITHOLAPAXY BLADDER STONE EXTRACTION     • FRACTURE SURGERY Right 1995    right leg   • GASTRIC BANDING REMOVAL  08/2006   • HYSTERECTOMY  1984   • INTERVENTIONAL RADIOLOGY PROCEDURE Bilateral 03/09/2018    Procedure: Pulmonary Angiogram;  Surgeon: Vladislav Monsivais MD;  Location: [facility] CATH INVASIVE LOCATION;  Service: Cardiovascular   • JOINT REPLACEMENT     • LAPAROSCOPIC GASTRIC BANDING  08/2004   • THORACOSCOPY Left 06/15/2018    Procedure: BRONCHOSCOPY, LEFT VIDEO ASSISTED THORACOSCOPY WITH DAVINCI ROBOT WITH LIGATION AV MALFORMATION INTERCOSTAL NERVE BLOCK WITH CRYOPROBE;  Surgeon: Darryl Briseno III, MD;  Location: [facility] MAIN OR;  Service: DaVinci   • TOTAL HIP ARTHROPLASTY Left 09/12/2022    Procedure: TOTAL HIP ARTHROPLASTY;  Surgeon: Reg Parisi MD;  Location: [facility] OR [facility];  Service: Orthopedics;  Laterality: Left;   • TOTAL HIP ARTHROPLASTY Right 12/21/2022    Procedure: TOTAL HIP ARTHROPLASTY;  Surgeon: Reg Parisi MD;  Location: [facility] OR [facility];  Service: Orthopedics;  Laterality: Right;       Family History   Problem Relation Age of Onset   • Melanoma Mother    • Breast cancer Mother    • Lung cancer Father    • Diabetes Father    • Heart disease Father    •  Hypertension Sister    • Heart attack Son    • Heart block Son    • Malig Hyperthermia Neg Hx        Social History     Socioeconomic History   • Marital status:    Tobacco Use   • Smoking status: Never   • Smokeless tobacco: Never   Vaping Use   • Vaping Use: Never used   Substance and Sexual Activity   • Alcohol use: No     Comment: daily caffiene, 1 energy drink    • Drug use: No   • Sexual activity: Defer           Objective   Physical Exam  Vitals and nursing note reviewed.   Constitutional:       General: She is not in acute distress.     Appearance: Normal appearance. She is well-developed. She is obese. She is not ill-appearing, toxic-appearing or diaphoretic.      Comments: 63-year-old white female lying in bed.  Patient is obese.  She appears in fair overall health.  Vital signs notable for BP of 159/98.  Patient's pain worsened with movement of the torso during history and physical.  Ms. moss is friendly and cooperative.   HENT:      Head: Normocephalic and atraumatic.      Right Ear: Tympanic membrane, ear canal and external ear normal.      Left Ear: Tympanic membrane, ear canal and external ear normal.      Nose: Nose normal.      Mouth/Throat:      Mouth: Mucous membranes are moist.      Pharynx: Oropharynx is clear.   Eyes:      Extraocular Movements: Extraocular movements intact.      Conjunctiva/sclera: Conjunctivae normal.      Pupils: Pupils are equal, round, and reactive to light.   Cardiovascular:      Rate and Rhythm: Normal rate and regular rhythm.      Pulses: Normal pulses.      Heart sounds: Normal heart sounds. No murmur heard.    No friction rub. No gallop.   Pulmonary:      Effort: Pulmonary effort is normal.      Breath sounds: Normal breath sounds.   Chest:      Chest wall: Tenderness (Mild) present.       Abdominal:      General: Abdomen is protuberant. A surgical scar is present. Bowel sounds are normal. There is no distension.      Palpations: Abdomen is soft. There is no  mass.      Tenderness: There is abdominal tenderness (Moderate to severe) in the right upper quadrant. There is no right CVA tenderness, left CVA tenderness, guarding or rebound. Negative signs include Rovsing's sign and McBurney's sign.      Hernia: No hernia is present.       Musculoskeletal:         General: Normal range of motion.      Cervical back: Normal range of motion and neck supple.   Skin:     General: Skin is warm and dry.      Capillary Refill: Capillary refill takes less than 2 seconds.   Neurological:      General: No focal deficit present.      Mental Status: She is alert and oriented to person, place, and time.      Deep Tendon Reflexes: Reflexes are normal and symmetric.   Psychiatric:         Mood and Affect: Mood normal.         Behavior: Behavior normal.         Procedures       EKG 12-lead  Date 5/3/2023  Time 19: 11.  Normal sinus rhythm  Normal rate  Leftward axis  Normal intervals  LVH by voltage with associated repolarization abnormalities  Nonspecific T wave changes  Abnormal EKG    ED Course  ED Course as of 05/03/23 2132   Wed May 03, 2023   1841 Patient has notable tenderness right upper quadrant.  Mild tenderness right inferior ribs.  Will obtain EKG, chest x-ray and troponin.  However patient notably more tender in the abdomen and the chest.  History of cholecystectomy as well as Lap-Band.  Obtaining full set of labs and CT of the abdomen pelvis with IV and oral contrast.  Patient declined offer for pain and nausea medicines at this time. [SS]   1925 Appearance, UA(!): Slightly Cloudy [SS]   1925 Leukocytes, UA(!): Trace [SS]   1925 Squamous Epithelial Cells, UA(!): 7-12 [SS]   1925 Bacteria, UA: None Seen [SS]   1925 WBC, UA(!): 3-5 [SS]   1925 RBC, UA(!): 3-5 [SS]   1925 CBC is unremarkable.  CMP does not show any significant abnormalities.  High-sensitivity troponin unremarkable. [SS]   1926   Urine sample is contaminated with 7-12 epithelial cells per high-power field.  The urine  is slightly cloudy, trace nitrite, leukocyte negative, no bacteria seen, 3-5 WBCs and 3-5 RBCs per high-power field. [SS]   2049 Patient resting comfortably in bed.  Discussed all results available thus far.  Awaiting CT.  Patient again declined offer for pain and nausea medications. [SS]   2128 CT does not show any acute disease process.  Incidental findings of bladder dilation with associated bilateral ureter dilation.  Postoperative changes of bilateral hips.  I do not find any specific cause for patient's abdominal pain.  Discussed at length with patient all results, diagnoses and follow-up.  Recommend patient see urologist secondary to bladder and ureteral dilation.  Will treat with NSAIDs.  Patient to follow-up with her PCP.  All questions answered.  Will DC home.    Prescription  1-nabumetone [SS]      ED Course User Index  [SS] Miguel Thompson MD      Labs Reviewed   COMPREHENSIVE METABOLIC PANEL - Abnormal; Notable for the following components:       Result Value    Glucose 101 (*)     Chloride 96 (*)     Calcium 10.7 (*)     BUN/Creatinine Ratio 29.4 (*)     All other components within normal limits    Narrative:     GFR Normal >60  Chronic Kidney Disease <60  Kidney Failure <15     URINALYSIS W/ MICROSCOPIC IF INDICATED (NO CULTURE) - Abnormal; Notable for the following components:    Appearance, UA Slightly Cloudy (*)     Blood, UA Trace (*)     Leuk Esterase, UA Trace (*)     All other components within normal limits   TROPONIN - Abnormal; Notable for the following components:    HS Troponin T 10 (*)     All other components within normal limits    Narrative:     High Sensitive Troponin T Reference Range:  <10.0 ng/L- Negative Female for AMI  <15.0 ng/L- Negative Male for AMI  >=10 - Abnormal Female indicating possible myocardial injury.  >=15 - Abnormal Male indicating possible myocardial injury.   Clinicians would have to utilize clinical acumen, EKG, Troponin, and serial changes to determine if it  is an Acute Myocardial Infarction or myocardial injury due to an underlying chronic condition.        URINALYSIS, MICROSCOPIC ONLY - Abnormal; Notable for the following components:    RBC, UA 3-5 (*)     WBC, UA 3-5 (*)     Squamous Epithelial Cells, UA 7-12 (*)     All other components within normal limits   CBC WITH AUTO DIFFERENTIAL - Normal   HIGH SENSITIVITIY TROPONIN T 2HR - Normal    Narrative:     High Sensitive Troponin T Reference Range:  <10.0 ng/L- Negative Female for AMI  <15.0 ng/L- Negative Male for AMI  >=10 - Abnormal Female indicating possible myocardial injury.  >=15 - Abnormal Male indicating possible myocardial injury.   Clinicians would have to utilize clinical acumen, EKG, Troponin, and serial changes to determine if it is an Acute Myocardial Infarction or myocardial injury due to an underlying chronic condition.        CBC AND DIFFERENTIAL    Narrative:     The following orders were created for panel order CBC & Differential.  Procedure                               Abnormality         Status                     ---------                               -----------         ------                     CBC Auto Differential[[id number]]        Normal              Final result                 Please view results for these tests on the individual orders.     XR Chest 2 View    Result Date: 5/3/2023  Narrative: TWO-VIEW CHEST     HISTORY: Right lower quadrant and left upper quadrant abdominal pain. No cough.  TECHNIQUE: PA and lateral chest  FINDINGS: No infiltrates or effusions. Calcified left lower lobe granuloma. Heart size within normal limits. Tortuous aorta with calcifications compatible with atherosclerotic change. Mild degenerative changes thoracic spine.      Impression: No acute cardiopulmonary disease.  This report was finalized on 5/3/2023 9:10 PM by Dr. MILY Ramos MD.      CT Abdomen Pelvis With Contrast    Result Date: 5/3/2023  Narrative: CT ABDOMEN PELVIS WITH CONTRAST  HISTORY:  Right upper quadrant pain since 2:00 AM this morning. Prior cholecystectomy and LAP-BAND. Removal of lap band in 1996.  TECHNIQUE: Axial imaging performed through the abdomen and pelvis following IV and oral contrast. Radiation dose reduction techniques included automated exposure control or exposure modulation based on body size. Radiation audit for CT and nuclear cardiology exams in the last 12 months: 0.  ABDOMEN FINDINGS: Lung bases unremarkable. Apparent ligation of large left lower lobe AVM. Liver and spleen unremarkable. Gallbladder surgically absent. No ductal dilatation. Pancreas and adrenal glands unremarkable. Several small left renal cortical cyst. Mild prominence of right renal pelvis and mild prominence of both ureters but no definite renal stone. Pelvic calcifications likely represent phleboliths and appear unchanged. There are few scattered colonic diverticula. The appendix not clearly identified but no focal inflammatory change.  PELVIS FINDINGS: Bladder moderately distended but obscured due to artifact from bilateral total hip arthroplasties. No pelvic mass. Large low density collection overlying the right hip measuring 5.6 x 8.6 x 1.7 cm and most compatible with a postoperative seroma. Some induration and edema noted overlying the left hip also likely the sequela of prior hip arthroplasty.      Impression: No acute abdominal or pelvic pathology. Moderately distended bladder with mild distention of the ureters bilaterally and mild distention of the right renal pelvis but no obstructing stone or mass. Evaluation of the bladder and pelvis limited due to artifact from hip arthroplasties.  This report was finalized on 5/3/2023 9:07 PM by Dr. MILY Ramos MD.      XR Foot Comp Min 3 Vws LT    Result Date: 4/10/2023  Narrative: REVIEWING YOUR TEST RESULTS IN [facility] IS NOT A SUBSTITUTE FOR DISCUSSING THOSE RESULTS WITH YOUR HEALTH CARE PROVIDER. PLEASE CONTACT YOUR PROVIDER VIA SupportLocal  TO DISCUSS ANY QUESTIONS OR CONCERNS YOU MAY HAVE REGARDING THESE TEST RESULTS.  RADIOLOGY REPORT FACILITY:  [facility] SERVICES UNIT/AGE/GENDER: MICKEYICCLG  OP      AGE:63 Y          SEX:F PATIENT NAME/:  NEREIDA MATHRU S    1960 UNIT NUMBER:  [id number] ACCOUNT NUMBER:  [id number] ACCESSION NUMBER:  [id number] EXAM: XR FOOT COMP MIN 3 VWS LT 4/10/2023 10:56 AM HISTORY: fall x 1 month ago, lt. foot pain COMPARISON: None. TECHNIQUE:  3 views of the left foot FINDINGS: No fracture or malalignment. Mild first MTP osteoarthritis. Joint spacing otherwise is maintained. Probable old dorsal navicular capsular avulsion injury. Soft tissues are unremarkable. IMPRESSION: No acute radiographic abnormality of the left foot Dictated by: Lamont Arias M.D. Images and Report reviewed and interpreted by: Lamont Arias M.D. <PS><Electronically signed by: Lamont Arias M.D.> 04/10/2023 1103 D: 04/10/2023 1101 T: 04/10/2023 1101    My differential diagnosis for abdominal pain includes but is not limited to:  Gastritis, gastroenteritis, peptic ulcer disease, GERD, esophageal perforation, acute appendicitis, mesenteric adenitis, Meckel’s diverticulum, epiploic appendagitis, diverticulitis, colon cancer, ulcerative colitis, Crohn’s disease, intussusception, small bowel obstruction, adhesions, ischemic bowel, perforated viscus, ileus, obstipation, biliary colic, cholecystitis, cholelithiasis, Tripp-Madi Bk, hepatitis, pancreatitis, common bile duct obstruction, cholangitis, bile leak, splenic trauma, splenic rupture, splenic infarction, splenic abscess, abdominal wall hematoma, abdominal wall abscess, intra-abdominal abscess, ascites, spontaneous bacterial peritonitis, hernia, UTI, cystitis, prostatitis, ureterolithiasis, urinary obstruction, AAA, myocardial infarction, pneumonia, cancer, porphyria, DKA, medications, sickle cell, viral syndrome, zoster    My differential diagnosis for chest pain includes but is not  limited to:  Muscle strain, costochondritis, myositis, pleurisy, rib fracture, intercostal neuritis, herpes zoster, tumor, myocardial infarction, coronary syndrome, unstable angina, angina, aortic dissection, mitral valve prolapse, pericarditis, palpitations, pulmonary embolus, pneumonia, pneumothorax, lung cancer, GERD, esophagitis, esophageal spasm                                       MDM    Final diagnoses:   Right upper quadrant abdominal pain   Bladder distention       ED Disposition  ED Disposition     ED Disposition   Discharge    Condition   Stable    Comment   --             Naif Lee MD  [address] [facility] [id number] [phone]    Schedule an appointment as soon as possible for a visit in 1 week  for continued or worsened symptoms, Sooner if needed    Darrell Ramos MD  [address] [id number] [phone]    Schedule an appointment as soon as possible for a visit in 1 week  For evaluation of bladder distention and ureteral distention., Sooner if needed         Medication List      New Prescriptions    nabumetone 500 MG tablet  Commonly known as: RELAFEN  Take 1 tablet by mouth 2 (Two) Times a Day for 7 days.           Where to Get Your Medications      These medications were sent to [place], [place] - [address] - [phone]  - [phone]   [address] [facility] [address]    Phone: [phone]   · nabumetone 500 MG tablet          Miguel Thompson MD  05/03/23 7520

## 2023-05-04 LAB — QT INTERVAL: 388 MS

## 2023-05-04 NOTE — DISCHARGE INSTRUCTIONS
Medications as directed.  Do not take ibuprofen while taking nabumetone.  Follow-up with your PCP as above.  Follow-up with urologist as discussed.  Return to ED for worsening symptoms, medical emergencies.

## 2023-05-09 ENCOUNTER — TRANSCRIBE ORDERS (OUTPATIENT)
Dept: ADMINISTRATIVE | Facility: HOSPITAL | Age: 63
End: 2023-05-09
Payer: COMMERCIAL

## 2023-05-09 DIAGNOSIS — N13.39 OTHER HYDRONEPHROSIS: Primary | ICD-10-CM

## 2023-05-15 ENCOUNTER — APPOINTMENT (OUTPATIENT)
Dept: BONE DENSITY | Facility: HOSPITAL | Age: 63
End: 2023-05-15
Payer: COMMERCIAL

## 2023-05-15 DIAGNOSIS — M85.80 OSTEOPENIA, UNSPECIFIED LOCATION: ICD-10-CM

## 2023-05-15 PROCEDURE — 77080 DXA BONE DENSITY AXIAL: CPT

## 2023-12-19 ENCOUNTER — OFFICE VISIT (OUTPATIENT)
Dept: ORTHOPEDIC SURGERY | Facility: CLINIC | Age: 63
End: 2023-12-19
Payer: COMMERCIAL

## 2023-12-19 VITALS — BODY MASS INDEX: 38.04 KG/M2 | HEIGHT: 64 IN | TEMPERATURE: 98.6 F | WEIGHT: 222.8 LBS

## 2023-12-19 DIAGNOSIS — Z96.641 STATUS POST TOTAL HIP REPLACEMENT, RIGHT: ICD-10-CM

## 2023-12-19 DIAGNOSIS — R52 PAIN: Primary | ICD-10-CM

## 2023-12-19 PROCEDURE — 99212 OFFICE O/P EST SF 10 MIN: CPT | Performed by: NURSE PRACTITIONER

## 2023-12-19 RX ORDER — HYDROCHLOROTHIAZIDE 25 MG/1
25 TABLET ORAL DAILY
COMMUNITY
Start: 2023-09-27 | End: 2024-03-25

## 2023-12-19 RX ORDER — CEPHALEXIN 500 MG/1
CAPSULE ORAL
Qty: 4 CAPSULE | Refills: 5 | Status: SHIPPED | OUTPATIENT
Start: 2023-12-19

## 2023-12-19 RX ORDER — MELATONIN: COMMUNITY

## 2023-12-19 RX ORDER — CITALOPRAM HYDROBROMIDE 10 MG/1
10 TABLET ORAL DAILY
COMMUNITY
Start: 2023-11-07 | End: 2024-05-05

## 2023-12-19 NOTE — PROGRESS NOTES
"Rylee GOLDBERG [facility] : 1960 MRN: [id number] DATE: 2023    DIAGNOSIS: Annual follow up right total hip  Posterior Lateral Approach    SUBJECTIVE:Patient returns today for a 1 year follow up of right total hip replacement Posterior Lateral Approach. Patient reports doing well with no unusual complaints. Denies any limitations or instability issues due to the hip.  Denies any signs or symptoms of infection, and she is without any other significant complaints today.    OBJECTIVE:    Temp 98.6 °F (37 °C) (Temporal)   Ht 162.6 cm (64.02\")   Wt 101 kg (222 lb 12.8 oz)   BMI 38.22 kg/m²   Family History   Problem Relation Age of Onset    Melanoma Mother     Breast cancer Mother     Lung cancer Father     Diabetes Father     Heart disease Father     Hypertension Sister     Heart attack Son     Heart block Son     Malig Hyperthermia Neg Hx      Past Medical History:   Diagnosis Date    Abrasion of left lower extremity     HAS APPT WITH DR WOOD AFTER PAT APPT TO DISCUSS ABRASION    Anxiety     TOOK MEDS IN THE PAST-NONE LAST TWO YRS    Arthritis     AVM (arteriovenous malformation)     Fracture, fibula     Fracture, tibia and fibula     GERD (gastroesophageal reflux disease)     Hip pain     History of anemia     History of kidney stones     Hypertension     HAS BEEN TREATED IN PAST WITH MEDS    Left hip pain     Shortness of breath on exertion     Sleep apnea     NO C-PAP IN USE     Past Surgical History:   Procedure Laterality Date    APPENDECTOMY      CARDIAC CATHETERIZATION N/A 2018    Procedure: Right Heart Cath;  Surgeon: Vladislav Monsivais MD;  [facility];  Service: Cardiovascular    CARDIAC CATHETERIZATION N/A 2018    Procedure: Left Heart Cath;  Surgeon: Vladislav Monsivais MD;  Location: [facility] CATH INVASIVE LOCATION;  Service: Cardiovascular    CARDIAC CATHETERIZATION N/A 2018    Procedure: Coronary angiography;  Surgeon: Vladislav Monsivais MD;  Location: [facility] CATH INVASIVE " LOCATION;  Service: Cardiovascular    CARDIAC CATHETERIZATION N/A 03/09/2018    Procedure: Left ventriculography;  Surgeon: Vladislav Monsivais MD;  [facility];  Service: Cardiovascular    COLONOSCOPY      CYSTOSCOPY LITHOLAPAXY BLADDER STONE EXTRACTION      FRACTURE SURGERY Right 1995    right leg    GASTRIC BANDING REMOVAL  08/2006    HYSTERECTOMY  1984    INTERVENTIONAL RADIOLOGY PROCEDURE Bilateral 03/09/2018    Procedure: Pulmonary Angiogram;  Surgeon: Vladislav Monsivais MD;  Location:  [facility] CATH INVASIVE [facility];  Service: Cardiovascular    JOINT REPLACEMENT      LAPAROSCOPIC GASTRIC BANDING  08/2004    THORACOSCOPY Left 06/15/2018    Procedure: BRONCHOSCOPY, LEFT VIDEO ASSISTED THORACOSCOPY WITH DAVINCI ROBOT WITH LIGATION AV MALFORMATION INTERCOSTAL NERVE BLOCK WITH CRYOPROBE;  Surgeon: Darryl Briseno III, MD;  Location: [facility] MAIN OR;  Service: DaVinci    TOTAL HIP ARTHROPLASTY Left 09/12/2022    Procedure: TOTAL HIP ARTHROPLASTY;  Surgeon: Reg Parisi MD;  [facility];  Service: Orthopedics;  Laterality: Left;    TOTAL HIP ARTHROPLASTY Right 12/21/2022    Procedure: TOTAL HIP ARTHROPLASTY;  Surgeon: Reg Parisi MD;  [facility];  Service: Orthopedics;  Laterality: Right;     Social History     Socioeconomic History    Marital status:    Tobacco Use    Smoking status: Never    Smokeless tobacco: Never   Vaping Use    Vaping Use: Never used   Substance and Sexual Activity    Alcohol use: No     Comment: daily caffiene, 1 energy drink     Drug use: No    Sexual activity: Defer     Review of Systems - a 14 point review of systems was performed. All systems were negative.    Exam:. The incision is well healed. The calf is soft and nontender with a negative Homans sign. Alignment is neutral. Good quad strength. No effusion. Intact to light touch with palpable distal pulses.     DIAGNOSTIC STUDIES  Xrays: 2 views of the right hip (AP and Lateral) were ordered  and reviewed for evaluation of recent hip replacement. They demonstrate a well positioned, well aligned hip replacement without complicating factors noted. In comparison with previous films there has been no change.    ASSESSMENT: Annual follow up right hip replacement Posterior Lateral Approach    PLAN:  Continue activities as tolerated    Follow up as needed      MATHEW Rahman  12/19/2023

## 2024-05-07 ENCOUNTER — TRANSCRIBE ORDERS (OUTPATIENT)
Dept: MAMMOGRAPHY | Facility: HOSPITAL | Age: 64
End: 2024-05-07
Payer: COMMERCIAL

## 2024-05-07 DIAGNOSIS — Z12.31 BREAST CANCER SCREENING BY MAMMOGRAM: Primary | ICD-10-CM

## 2024-06-03 ENCOUNTER — HOSPITAL ENCOUNTER (OUTPATIENT)
Dept: MAMMOGRAPHY | Facility: HOSPITAL | Age: 64
Discharge: HOME | End: 2024-06-03
Admitting: INTERNAL MEDICINE
Payer: MEDICARE

## 2024-06-03 DIAGNOSIS — Z12.31 BREAST CANCER SCREENING BY MAMMOGRAM: ICD-10-CM

## 2024-06-03 PROCEDURE — 77063 BREAST TOMOSYNTHESIS BI: CPT

## 2024-06-03 PROCEDURE — 77067 SCR MAMMO BI INCL CAD: CPT

## 2024-06-03 PROCEDURE — 77063 BREAST TOMOSYNTHESIS BI: CPT | Performed by: RADIOLOGY

## 2024-06-03 PROCEDURE — 77067 SCR MAMMO BI INCL CAD: CPT | Performed by: RADIOLOGY

## 2025-04-17 ENCOUNTER — HOSPITAL ENCOUNTER (EMERGENCY)
Facility: HOSPITAL | Age: 65
Discharge: HOME | End: 2025-04-17
Attending: EMERGENCY MEDICINE
Payer: MEDICARE

## 2025-04-17 ENCOUNTER — APPOINTMENT (OUTPATIENT)
Dept: GENERAL RADIOLOGY | Facility: HOSPITAL | Age: 65
End: 2025-04-17
Payer: MEDICARE

## 2025-04-17 VITALS
TEMPERATURE: 98.7 F | OXYGEN SATURATION: 96 % | WEIGHT: 225 LBS | RESPIRATION RATE: 18 BRPM | BODY MASS INDEX: 37.49 KG/M2 | SYSTOLIC BLOOD PRESSURE: 201 MMHG | HEART RATE: 77 BPM | DIASTOLIC BLOOD PRESSURE: 101 MMHG | HEIGHT: 65 IN

## 2025-04-17 DIAGNOSIS — S20.212A RIB CONTUSION, LEFT, INITIAL ENCOUNTER: ICD-10-CM
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
## 2025-04-17 PROCEDURE — 99283 EMERGENCY DEPT VISIT LOW MDM: CPT | Performed by: EMERGENCY MEDICINE

## 2025-04-17 PROCEDURE — 71101 X-RAY EXAM UNILAT RIBS/CHEST: CPT

## 2025-04-17 RX ORDER — HYDROCODONE BITARTRATE AND ACETAMINOPHEN 5; 325 MG/1; MG/1
TABLET ORAL
Status: COMPLETED
Start: 2025-04-17 | End: 2025-04-17

## 2025-04-17 RX ORDER — HYDROCHLOROTHIAZIDE 25 MG/1
25 TABLET ORAL DAILY
Status: DISCONTINUED | OUTPATIENT
Start: 2025-04-17 | End: 2025-04-18 | Stop reason: HOSPADM

## 2025-04-17 RX ORDER — HYDROCODONE BITARTRATE AND ACETAMINOPHEN 5; 325 MG/1; MG/1
1 TABLET ORAL EVERY 6 HOURS PRN
Qty: 30 TABLET | Refills: 0 | Status: SHIPPED | OUTPATIENT
Start: 2025-04-17

## 2025-04-17 RX ORDER — HYDROCODONE BITARTRATE AND ACETAMINOPHEN 5; 325 MG/1; MG/1
1 TABLET ORAL ONCE
Refills: 0 | Status: COMPLETED | OUTPATIENT
Start: 2025-04-17 | End: 2025-04-17

## 2025-04-17 RX ADMIN — HYDROCHLOROTHIAZIDE 25 MG: 25 TABLET ORAL at 23:08

## 2025-04-17 RX ADMIN — HYDROCODONE BITARTRATE AND ACETAMINOPHEN 1 TABLET: 5; 325 TABLET ORAL at 22:47

## 2025-04-18 NOTE — DISCHARGE INSTRUCTIONS
Cough and deep breathe 2-3 times an hour while awake for 2 weeks.  Norco as needed as prescribed for pain.  Take Colace as needed as directed for constipation.  Take the Norco for pain.  Follow-up with Dr. Lee in 1 week.  Return to the emergency department if there is increased pain, fever, shortness of breath, worse in any way at all.

## 2025-04-18 NOTE — ED PROVIDER NOTES
Subjective   History of Present Illness    Chief complaint: Fall with rib pain    Location: Left-sided rib    Quality/Severity: Moderate sharp pain    Timing/Onset/Duration: Started just prior to arrival    Modifying Factors: Hurts to move    Associated Symptoms: No loss of consciousness.  No neck or back pain.  No shortness of breath.  No abdominal pain.  No numbness, tingling, weakness, or change in bladder bowel function    Narrative: This 65-year-old presents with left chest wall pain and left rib pain after a fall.  The patient is not on blood thinners.    PCP:Nafi Lee MD      Review of Systems   HENT:  Negative for nosebleeds and rhinorrhea.    Respiratory:  Negative for cough and shortness of breath.    Cardiovascular:  Positive for chest pain (Left-sided rib pain).   Gastrointestinal:  Negative for abdominal pain, nausea and vomiting.   Genitourinary:  Negative for difficulty urinating.   Musculoskeletal:  Negative for back pain and neck pain.   Skin:  Negative for wound.   Neurological:  Negative for weakness, numbness and headaches.       Past Medical History:   Diagnosis Date    Abrasion of left lower extremity     HAS APPT WITH DR WOOD AFTER PAT APPT TO DISCUSS ABRASION    Anxiety     TOOK MEDS IN THE PAST-NONE LAST TWO YRS    Arthritis     AVM (arteriovenous malformation)     Fracture, fibula     Fracture, tibia and fibula     GERD (gastroesophageal reflux disease)     Hip pain     History of anemia     History of kidney stones     Hypertension     HAS BEEN TREATED IN PAST WITH MEDS    Left hip pain     Shortness of breath on exertion     Sleep apnea     NO C-PAP IN USE       No Known Allergies    Past Surgical History:   Procedure Laterality Date    APPENDECTOMY      CARDIAC CATHETERIZATION N/A 03/09/2018    Procedure: Right Heart Cath;  Surgeon: Vladislav Monsivais MD;  Location: [facility] INVASIVE LOCATION;  Service: Cardiovascular    CARDIAC CATHETERIZATION N/A 03/09/2018    Procedure: Left Heart  Cath;  Surgeon: Vladislav Monsivais MD;  Location: [facility] CATH INVASIVE LOCATION;  Service: Cardiovascular    CARDIAC CATHETERIZATION N/A 03/09/2018    Procedure: Coronary angiography;  Surgeon: Vladislav Monsivais MD;  Location: [facility] CATH INVASIVE LOCATION;  Service: Cardiovascular    CARDIAC CATHETERIZATION N/A 03/09/2018    Procedure: Left ventriculography;  Surgeon: Vladislav Monsivais MD;  Location: [facility] CATH INVASIVE LOCATION;  Service: Cardiovascular    COLONOSCOPY      CYSTOSCOPY LITHOLAPAXY BLADDER STONE EXTRACTION      FRACTURE SURGERY Right 1995    right leg    GASTRIC BANDING REMOVAL  08/2006    HYSTERECTOMY  1984    INTERVENTIONAL RADIOLOGY PROCEDURE Bilateral 03/09/2018    Procedure: Pulmonary Angiogram;  Surgeon: Vladislav Monsivais MD;  Location: [facility] CATH INVASIVE LOCATION;  Service: Cardiovascular    JOINT REPLACEMENT      LAPAROSCOPIC GASTRIC BANDING  08/2004    THORACOSCOPY Left 06/15/2018    Procedure: BRONCHOSCOPY, LEFT VIDEO ASSISTED THORACOSCOPY WITH DAVINCI ROBOT WITH LIGATION AV MALFORMATION INTERCOSTAL NERVE BLOCK WITH CRYOPROBE;  Surgeon: Darryl Briseno III, MD;  Location: [facility] MAIN OR;  Service: DaVinci    TOTAL HIP ARTHROPLASTY Left 09/12/2022    Procedure: TOTAL HIP ARTHROPLASTY;  Surgeon: Reg Parisi MD;  Location: [facility] OR [facility];  Service: Orthopedics;  Laterality: Left;    TOTAL HIP ARTHROPLASTY Right 12/21/2022    Procedure: TOTAL HIP ARTHROPLASTY;  Surgeon: Reg Parisi MD;  Location: [facility] OR [facility];  Service: Orthopedics;  Laterality: Right;       Family History   Problem Relation Age of Onset    Melanoma Mother     Breast cancer Mother     Lung cancer Father     Diabetes Father     Heart disease Father     Hypertension Sister     Heart attack Son     Heart block Son     Malig Hyperthermia Neg Hx        Social History     Socioeconomic History    Marital status:    Tobacco Use    Smoking status: Never    Smokeless tobacco: Never   Vaping Use    Vaping status: Never Used   Substance and Sexual  Activity    Alcohol use: No     Comment: daily caffiene, 1 energy drink     Drug use: No    Sexual activity: Defer           Objective   Physical Exam  Vitals (The tear is 98.8 °F, pulse 77, respirations 18, /102, room air pulse ox 96%.) and nursing note reviewed.   Constitutional:       Appearance: Normal appearance.   HENT:      Head: Normocephalic and atraumatic.      Nose: No rhinorrhea.      Mouth/Throat:      Mouth: Mucous membranes are moist.   Neck:      Comments: The no tenderness, deformity, or bony step-offs upon palpation of the cervical, thoracic, lumbar sacrococcygeal spine  Cardiovascular:      Rate and Rhythm: Normal rate and regular rhythm.      Pulses: Normal pulses.      Heart sounds: Normal heart sounds. No murmur heard.     No friction rub. No gallop.   Pulmonary:      Effort: Pulmonary effort is normal.      Breath sounds: Normal breath sounds.   Chest:      Chest wall: Tenderness (Moderate left-sided chest tenderness, no bruising or crepitus or deformity) present.   Abdominal:      General: Abdomen is flat. Bowel sounds are normal. There is no distension.      Palpations: Abdomen is soft. There is no mass.      Tenderness: There is no abdominal tenderness. There is no left CVA tenderness, guarding or rebound.      Hernia: No hernia is present.   Musculoskeletal:         General: No swelling or tenderness. Normal range of motion.   Skin:     General: Skin is warm and dry.      Capillary Refill: Capillary refill takes less than 2 seconds.   Neurological:      General: No focal deficit present.      Mental Status: She is alert and oriented to person, place, and time.      Sensory: No sensory deficit.      Motor: No weakness.         Procedures           ED Course                                                       Medical Decision Making  Amount and/or Complexity of Data Reviewed  Radiology: ordered.        Final diagnoses:   None       ED Disposition  ED Disposition       None             No follow-up provider specified.       Medication List      No changes were made to your prescriptions during this visit.       XR Ribs Left With PA Chest   Final Result   1.No evidence for displaced rib fracture.   2.No evidence for acute cardiopulmonary process.            Electronically Signed: Geo Bishop MD     4/17/2025 10:11 PM EDT     Workstation ID: [id number]        Labs Reviewed - No data to display  XR Ribs Left With PA Chest  Result Date: 4/17/2025  Narrative: XR RIBS LEFT W PA CHEST Date of Exam: 4/17/2025 9:50 PM EDT Indication: fall and injured chest wall on counter Comparison: 5/3/2023 FINDINGS: There is no evidence for displaced rib fracture. No underlying atelectasis or infiltrate is seen. There is no evidence for pleural effusion. There is no evidence for pneumothorax. The cardiac silhouette and mediastinum are stable. A stable granuloma is noted in the left lower lobe.     Impression: 1.No evidence for displaced rib fracture. 2.No evidence for acute cardiopulmonary process. Electronically Signed: Geo Bishop MD  4/17/2025 10:11 PM EDT  Workstation ID: [id number]      Final diagnoses:   Fall, initial encounter   Rib contusion, left, initial encounter         ED Medications:  Medications - No data to display    New Medications:     Medication List        ASK your doctor about these medications      aspirin 81 MG EC tablet  Take 1 tab po bid x 14 days; then take 1 tab po daily x 28 days     * cephalexin 500 MG capsule  Commonly known as: KEFLEX  Take all 4 capsules one hour prior to procedure     * cephalexin 500 MG capsule  Commonly known as: KEFLEX  Take all 4 capsules one hour prior to procedure     cholecalciferol 25 MCG (1000 UT) tablet  Commonly known as: VITAMIN D3     citalopram 10 MG tablet  Commonly known as: CeleXA     hydroCHLOROthiazide 25 MG tablet     HYDROcodone-acetaminophen 7.5-325 MG per tablet  Commonly known as: NORCO  Take 1 tablet by mouth Every 4 (Four) Hours As  Needed for Moderate Pain or Severe Pain.     ondansetron 4 MG tablet  Commonly known as: Zofran  Take 1 tablet by mouth Every 8 (Eight) Hours As Needed for Nausea or Vomiting for up to 10 doses.           * This list has 2 medication(s) that are the same as other medications prescribed for you. Read the directions carefully, and ask your doctor or other care provider to review them with you.                  Stopped Medications:     Medication List        ASK your doctor about these medications      aspirin 81 MG EC tablet  Take 1 tab po bid x 14 days; then take 1 tab po daily x 28 days     * cephalexin 500 MG capsule  Commonly known as: KEFLEX  Take all 4 capsules one hour prior to procedure     * cephalexin 500 MG capsule  Commonly known as: KEFLEX  Take all 4 capsules one hour prior to procedure     cholecalciferol 25 MCG (1000 UT) tablet  Commonly known as: VITAMIN D3     citalopram 10 MG tablet  Commonly known as: CeleXA     hydroCHLOROthiazide 25 MG tablet     HYDROcodone-acetaminophen 7.5-325 MG per tablet  Commonly known as: NORCO  Take 1 tablet by mouth Every 4 (Four) Hours As Needed for Moderate Pain or Severe Pain.     ondansetron 4 MG tablet  Commonly known as: Zofran  Take 1 tablet by mouth Every 8 (Eight) Hours As Needed for Nausea or Vomiting for up to 10 doses.           * This list has 2 medication(s) that are the same as other medications prescribed for you. Read the directions carefully, and ask your doctor or other care provider to review them with you.                       Ant Montiel MD  04/17/25 [id number]

## 2025-05-22 ENCOUNTER — HOSPITAL ENCOUNTER (EMERGENCY)
Facility: HOSPITAL | Age: 65
Discharge: HOME | End: 2025-05-22
Attending: EMERGENCY MEDICINE
Payer: MEDICARE

## 2025-05-22 ENCOUNTER — APPOINTMENT (OUTPATIENT)
Dept: GENERAL RADIOLOGY | Facility: HOSPITAL | Age: 65
End: 2025-05-22
Payer: MEDICARE

## 2025-05-22 VITALS
BODY MASS INDEX: 39.27 KG/M2 | SYSTOLIC BLOOD PRESSURE: 174 MMHG | WEIGHT: 230 LBS | HEIGHT: 64 IN | HEART RATE: 76 BPM | TEMPERATURE: 98.5 F | OXYGEN SATURATION: 97 % | DIASTOLIC BLOOD PRESSURE: 69 MMHG | RESPIRATION RATE: 18 BRPM

## 2025-05-22 DIAGNOSIS — M25.571 ACUTE RIGHT ANKLE PAIN: ICD-10-CM

## 2025-05-22 DIAGNOSIS — M25.471 ANKLE SWELLING, RIGHT: Primary | ICD-10-CM

## 2025-05-22 DIAGNOSIS — R60.0 EDEMA OF SOFT TISSUE OF RIGHT ANKLE REGION: ICD-10-CM

## 2025-05-22 PROCEDURE — 99283 EMERGENCY DEPT VISIT LOW MDM: CPT | Performed by: EMERGENCY MEDICINE

## 2025-05-22 PROCEDURE — 25010000002 DEXAMETHASONE PER 1 MG: Performed by: EMERGENCY MEDICINE

## 2025-05-22 PROCEDURE — 73610 X-RAY EXAM OF ANKLE: CPT

## 2025-05-22 RX ORDER — DEXAMETHASONE SODIUM PHOSPHATE 4 MG/ML
10 VIAL (ML) INJECTION ONCE
Status: COMPLETED | OUTPATIENT
Start: 2025-05-22 | End: 2025-05-22

## 2025-05-22 RX ADMIN — DEXAMETHASONE SODIUM PHOSPHATE 10 MG: 4 INJECTION INTRA-ARTICULAR; INTRALESIONAL; INTRAMUSCULAR; INTRAVENOUS; SOFT TISSUE at 23:47

## 2025-05-23 ENCOUNTER — HOSPITAL ENCOUNTER (OUTPATIENT)
Dept: ULTRASOUND IMAGING | Facility: HOSPITAL | Age: 65
Discharge: HOME | End: 2025-05-23
Payer: MEDICARE

## 2025-05-23 DIAGNOSIS — R60.0 EDEMA OF SOFT TISSUE OF RIGHT ANKLE REGION: ICD-10-CM

## 2025-05-23 DIAGNOSIS — M25.571 ACUTE RIGHT ANKLE PAIN: ICD-10-CM

## 2025-05-23 PROCEDURE — 93971 EXTREMITY STUDY: CPT

## 2025-05-23 NOTE — ED PROVIDER NOTES
Subjective   History of Present Illness  Patient presents complaining of a 2-day history of right ankle pain and swelling.  Patient works at [employer] and during her shift she started having some discomfort but denies any known injury.  Patient said by the end of the shift her ankle was swollen.  The next day patient put a brace on and wore that.  Patient took the brace off she noticed some redness that had developed.  Patient also is having some right posterior calf pain on and off.  Patient denies any history of DVT or PE but has had a kaleb placed in her right lower extremity.  Patient denies any previous episodes.  No therapy taken prior to arrival.  Patient has tried taking anti-inflammatories as well as pain pills and nothing seems to help.      Review of Systems   All other systems reviewed and are negative.      Past Medical History:   Diagnosis Date    Abrasion of left lower extremity     HAS APPT WITH DR WOOD AFTER PAT APPT TO DISCUSS ABRASION    Anxiety     TOOK MEDS IN THE PAST-NONE LAST TWO YRS    Arthritis     AVM (arteriovenous malformation)     Fracture, fibula     Fracture, tibia and fibula     GERD (gastroesophageal reflux disease)     Hip pain     History of anemia     History of kidney stones     Hypertension     HAS BEEN TREATED IN PAST WITH MEDS    Left hip pain     Shortness of breath on exertion     Sleep apnea     NO C-PAP IN USE       No Known Allergies    Past Surgical History:   Procedure Laterality Date    APPENDECTOMY      CARDIAC CATHETERIZATION N/A 03/09/2018    Procedure: Right Heart Cath;  Surgeon: Vladislav Monsivais MD;  Location: [facility] INVASIVE LOCATION;  Service: Cardiovascular    CARDIAC CATHETERIZATION N/A 03/09/2018    Procedure: Left Heart Cath;  Surgeon: Vladislav Monsivais MD;  Location: [facility] INVASIVE LOCATION;  Service: Cardiovascular    CARDIAC CATHETERIZATION N/A 03/09/2018    Procedure: Coronary angiography;  Surgeon: Vladislav Monsivais MD;  Location: [facility] INVASIVE LOCATION;   Service: Cardiovascular    CARDIAC CATHETERIZATION N/A 03/09/2018    Procedure: Left ventriculography;  Surgeon: Vladislav Monsivais MD;  Location:  [facility] CATH INVASIVE LOCATION;  Service: Cardiovascular    COLONOSCOPY      CYSTOSCOPY LITHOLAPAXY BLADDER STONE EXTRACTION      FRACTURE SURGERY Right 1995    right leg    GASTRIC BANDING REMOVAL  08/2006    HYSTERECTOMY  1984    INTERVENTIONAL RADIOLOGY PROCEDURE Bilateral 03/09/2018    Procedure: Pulmonary Angiogram;  Surgeon: Vladislav Monsivais MD;  Location:  [facility] CATH INVASIVE LOCATION;  Service: Cardiovascular    JOINT REPLACEMENT      LAPAROSCOPIC GASTRIC BANDING  08/2004    THORACOSCOPY Left 06/15/2018    Procedure: BRONCHOSCOPY, LEFT VIDEO ASSISTED THORACOSCOPY WITH DAVINCI ROBOT WITH LIGATION AV MALFORMATION INTERCOSTAL NERVE BLOCK WITH CRYOPROBE;  Surgeon: Darryl Briseno III, MD;  [facility];  Service: DaVinci    TOTAL HIP ARTHROPLASTY Left 09/12/2022    Procedure: TOTAL HIP ARTHROPLASTY;  Surgeon: Reg Parisi MD;  Location:  [facility];  Service: Orthopedics;  Laterality: Left;    TOTAL HIP ARTHROPLASTY Right 12/21/2022    Procedure: TOTAL HIP ARTHROPLASTY;  Surgeon: Reg Parisi MD;  Location:  [facility];  Service: Orthopedics;  Laterality: Right;       Family History   Problem Relation Age of Onset    Melanoma Mother     Breast cancer Mother     Lung cancer Father     Diabetes Father     Heart disease Father     Hypertension Sister     Heart attack Son     Heart block Son     Malig Hyperthermia Neg Hx        Social History     Socioeconomic History    Marital status:    Tobacco Use    Smoking status: Never    Smokeless tobacco: Never   Vaping Use    Vaping status: Never Used   Substance and Sexual Activity    Alcohol use: No     Comment: daily caffiene, 1 energy drink     Drug use: No    Sexual activity: Defer           Objective   Physical Exam  Vitals and nursing note reviewed.   Constitutional:       Comments: Patient sitting in chair  comfortably, talkative, friendly, no signs of distress.  Cooperative with exam.   HENT:      Head: Normocephalic.      Right Ear: External ear normal.      Left Ear: External ear normal.      Nose: Nose normal.      Mouth/Throat:      Mouth: Mucous membranes are moist.      Pharynx: Oropharynx is clear.   Eyes:      Conjunctiva/sclera: Conjunctivae normal.   Cardiovascular:      Pulses:           Dorsalis pedis pulses are 2+ on the right side.        Posterior tibial pulses are 2+ on the right side.   Pulmonary:      Effort: Pulmonary effort is normal.   Musculoskeletal:      Cervical back: Neck supple.      Right lower le+ Pitting Edema present.        Feet:    Feet:      Comments: Nonblanching petechial clustering erythema in a bandlike strip surrounding the right ankle.  Nontender to palpation.  Skin:     General: Skin is warm and dry.      Capillary Refill: Capillary refill takes 2 to 3 seconds.   Neurological:      Mental Status: She is alert.      Sensory: No sensory deficit.      Motor: No weakness.   Psychiatric:         Mood and Affect: Mood normal.         Behavior: Behavior normal.         Procedures           ED Course                                                       Medical Decision Making  Ddx cellulitis, DVT, dependent edema, arthritis    XR Ankle 3+ View Right  Result Date: 2025  Impression: 1.No acute osseous abnormality. 2.Osteopenia. 3.Mild degenerative changes. Electronically Signed: Chandler Perez MD  2025 11:31 PM EDT  Workstation ID: [id number]    [id number] Pt seen again prior to d/c.  Imaging reviewed and are unremarkable.  Patient given a dose of dexamethasone and scheduled for ultrasound tomorrow.  All questions personally answered at the bedside and all d/c instructions personally reviewed with pt.  Discussed the importance of close outpt. f/u and pt. understands this and agrees to do so.  Pt agrees to return to ED immediately for any new, persistent, or worsening  symptoms.    EMR Dragon/Transcription disclaimer:  Much of this encounter note is an electronic transcription/translation of spoken language to printed text using the Dragon Dictation System         Problems Addressed:  Acute right ankle pain: complicated acute illness or injury  Ankle swelling, right: complicated acute illness or injury  Edema of soft tissue of right ankle region: complicated acute illness or injury    Risk  Prescription drug management.        Final diagnoses:   Ankle swelling, right   Acute right ankle pain   Edema of soft tissue of right ankle region       ED Disposition  ED Disposition       ED Disposition   Discharge    Condition   Stable    Comment   --               Podiatry    In 3 days  If symptoms worsen         Medication List      No changes were made to your prescriptions during this visit.            Vishal Sotelo MD  05/22/25 [id number]

## 2025-05-30 ENCOUNTER — TRANSCRIBE ORDERS (OUTPATIENT)
Dept: ADMINISTRATIVE | Facility: HOSPITAL | Age: 65
End: 2025-05-30
Payer: MEDICARE

## 2025-05-30 DIAGNOSIS — M25.571 ACUTE RIGHT ANKLE PAIN: Primary | ICD-10-CM

## (undated) DEVICE — 3M™ IOBAN™ 2 ANTIMICROBIAL INCISE DRAPE 6650EZ: Brand: IOBAN™ 2

## (undated) DEVICE — CANNULA SEAL

## (undated) DEVICE — SUT PDS 1 CT1 36IN Z347H

## (undated) DEVICE — SYR LUERLOK 30CC

## (undated) DEVICE — GLV SURG PREMIERPRO ORTHO LTX PF SZ7.5 BRN

## (undated) DEVICE — SOL IRR NACL 0.9PCT 3000ML

## (undated) DEVICE — CATH DIAG IMPULSE FR4 5F 100CM

## (undated) DEVICE — CONMED DISPOSABLE BIPOLAR CABLE, 10' (3.05M): Brand: CONMED

## (undated) DEVICE — ANTIBACTERIAL UNDYED BRAIDED (POLYGLACTIN 910), SYNTHETIC ABSORBABLE SUTURE: Brand: COATED VICRYL

## (undated) DEVICE — PREP SOL POVIDONE/IODINE BT 4OZ

## (undated) DEVICE — GLV SURG SENSICARE PI MIC PF SZ7 LF STRL

## (undated) DEVICE — Device

## (undated) DEVICE — 3M™ STERI-STRIP™ REINFORCED ADHESIVE SKIN CLOSURES, R1548, 1 IN X 5 IN (25 MM X 125 MM), 4 STRIPS/ENVELOPE: Brand: 3M™ STERI-STRIP™

## (undated) DEVICE — HANDPIECE SET WITH COAXIAL HIGH FLOW TIP AND SUCTION TUBE: Brand: INTERPULSE

## (undated) DEVICE — SYS CLS SKIN PREMIERPRO EXOFINFUSION 22CM

## (undated) DEVICE — TRAP FLD MINIVAC MEGADYNE 100ML

## (undated) DEVICE — ENCORE® LATEX ORTHO SIZE 8, STERILE LATEX POWDER-FREE SURGICAL GLOVE: Brand: ENCORE

## (undated) DEVICE — SUT VIC 0 CT 36IN J958H

## (undated) DEVICE — NDL SPINE 22G 31/2IN BLK

## (undated) DEVICE — TOTAL TRAY, 16FR 10ML SIL FOLEY, URN: Brand: MEDLINE

## (undated) DEVICE — SEAL CANN CAM ENDOWRIST DAVINCI/S 8.5MM

## (undated) DEVICE — VISUALIZATION SYSTEM: Brand: CLEARIFY

## (undated) DEVICE — CATH VENT MIV RADL PIG ST TIP 5F 110CM

## (undated) DEVICE — 3M™ IOBAN™ 2 ANTIMICROBIAL INCISE DRAPE 6640EZ: Brand: IOBAN™ 2

## (undated) DEVICE — TP UMB COTN 1/8X36 U12T

## (undated) DEVICE — PK HIP TOTL 40

## (undated) DEVICE — REFLECTION FLEXIBLE DRILL 25MM: Brand: REFLECTION

## (undated) DEVICE — SUT SILK 0 TIES 30IN A306H

## (undated) DEVICE — 2, DISPOSABLE SUCTION/IRRIGATOR WITH DISPOSABLE TIP: Brand: STRYKEFLOW

## (undated) DEVICE — GLIDESHEATH BASIC HYDROPHILIC COATED INTRODUCER SHEATH: Brand: GLIDESHEATH

## (undated) DEVICE — TBG PENCL TELESCP MEGADYNE SMOKE EVAC 10FT

## (undated) DEVICE — SUT VIC 1 CT1 36IN J947H

## (undated) DEVICE — OBT BLADLES ENDOWRIST DAVINCI/S 8MM

## (undated) DEVICE — HI-TORQUE BALANCE MIDDLEWEIGHT UNIVERSAL II GUIDE WIRE STRAIGHT TIP PAK  190 CM: Brand: HI-TORQUE BALANCE MIDDLEWEIGHT UNIVERSAL II

## (undated) DEVICE — CATH DIAG IMPULSE FL3.5 5F 100CM

## (undated) DEVICE — DRP SLUSH WARMR MACH 52X66IN OM-ORS-301

## (undated) DEVICE — DISPOSABLE MONOPOLAR ENDOSCOPIC CORD 10 FT. (3M): Brand: KIRWAN

## (undated) DEVICE — CATH VENT MIV RADL PIG ST TIP 4F 110CM

## (undated) DEVICE — BALN PRESS WEDGE 5F 110CM

## (undated) DEVICE — PREMIUM WET SKIN PREP TRAY: Brand: MEDLINE INDUSTRIES, INC.

## (undated) DEVICE — 3M™ DURAPORE™ SURGICAL TAPE 1538-3, 3 INCH X 10 YARD (7,5CM X 9,1M), 4 ROLLS/BOX: Brand: 3M™ DURAPORE™

## (undated) DEVICE — CATH GUIDE CONVEY AL1 .058IN 5FR

## (undated) DEVICE — SUT VIC 2 TP1 54IN J880T

## (undated) DEVICE — APPL DURAPREP IODOPHOR APL 26ML

## (undated) DEVICE — ENDOPATH XCEL BLADELESS TROCARS WITH STABILITY SLEEVES: Brand: ENDOPATH XCEL

## (undated) DEVICE — RL DENTL WO/ STRNG LF STRL

## (undated) DEVICE — VESSEL LOOPS X-RAY DETECTABLE: Brand: DEROYAL

## (undated) DEVICE — GLV SURG BIOGEL LTX PF 7 1/2

## (undated) DEVICE — SUT SILK 0 PSL 18IN 580H

## (undated) DEVICE — GLV SURG SENSICARE W/ALOE PF LF 7.5 STRL

## (undated) DEVICE — LAPAROSCOPIC GAS CONDITIONING DEVICE.: Brand: INSUFLOW

## (undated) DEVICE — SPNG LAP 18X18IN LF STRL PK/5

## (undated) DEVICE — GLV SURG SENSICARE W/ALOE PF LF 8 STRL

## (undated) DEVICE — APPL CHLORAPREP W/TINT 26ML ORNG

## (undated) DEVICE — 3M™ STERI-DRAPE™ INSTRUMENT POUCH 1018L: Brand: STERI-DRAPE™

## (undated) DEVICE — SOL NACL 0.9PCT 1000ML

## (undated) DEVICE — GLV SURG SENSICARE PI PF LF 7 GRN STRL

## (undated) DEVICE — DRSNG WND BORDR/ADHS NONADHR/GZ LF 4X14IN STRL

## (undated) DEVICE — LOU THORACIC: Brand: MEDLINE INDUSTRIES, INC.

## (undated) DEVICE — MAT FLR ABSORBENT LG 4FT 10 2.5FT

## (undated) DEVICE — PK CATH CARD 40

## (undated) DEVICE — 450 ML BOTTLE OF 0.05% CHLORHEXIDINE GLUCONATE IN 99.95% STERILE WATER FOR IRRIGATION, USP AND APPLICATOR.: Brand: IRRISEPT ANTIMICROBIAL WOUND LAVAGE

## (undated) DEVICE — SOL NACL 0.9PCT 100ML SGL

## (undated) DEVICE — KT MANIFLD CARDIAC

## (undated) DEVICE — RADIFOCUS GLIDEWIRE ADVANTAGE GUIDEWIRE: Brand: GLIDEWIRE ADVANTAGE

## (undated) DEVICE — SOL ANTISTICK CAUTRY ELECTROLUBE LF

## (undated) DEVICE — PROB CRYO ABL ICE MALL LSS BEND 10CM

## (undated) DEVICE — DRP ARM DAVINCI

## (undated) DEVICE — GW EMR FIX EXCHG J STD .035 3MM 260CM